# Patient Record
Sex: MALE | Race: WHITE | NOT HISPANIC OR LATINO | URBAN - METROPOLITAN AREA
[De-identification: names, ages, dates, MRNs, and addresses within clinical notes are randomized per-mention and may not be internally consistent; named-entity substitution may affect disease eponyms.]

---

## 2017-01-24 ENCOUNTER — SURGERY - HEALTHEAST (OUTPATIENT)
Dept: CARDIOLOGY | Facility: CLINIC | Age: 57
End: 2017-01-24

## 2017-01-24 ASSESSMENT — MIFFLIN-ST. JEOR
SCORE: 1907.15
SCORE: 1905.79

## 2017-01-25 ENCOUNTER — COMMUNICATION - HEALTHEAST (OUTPATIENT)
Dept: CARDIOLOGY | Facility: CLINIC | Age: 57
End: 2017-01-25

## 2017-01-25 ASSESSMENT — MIFFLIN-ST. JEOR: SCORE: 1912.6

## 2017-01-26 ENCOUNTER — COMMUNICATION - HEALTHEAST (OUTPATIENT)
Dept: CARDIOLOGY | Facility: CLINIC | Age: 57
End: 2017-01-26

## 2017-02-06 ENCOUNTER — AMBULATORY - HEALTHEAST (OUTPATIENT)
Dept: CARDIAC REHAB | Facility: HOSPITAL | Age: 57
End: 2017-02-06

## 2017-02-06 DIAGNOSIS — Z95.5 STENTED CORONARY ARTERY: ICD-10-CM

## 2017-02-06 DIAGNOSIS — I21.4 NSTEMI (NON-ST ELEVATED MYOCARDIAL INFARCTION) (H): ICD-10-CM

## 2017-02-06 ASSESSMENT — MIFFLIN-ST. JEOR: SCORE: 1957.05

## 2017-02-10 ENCOUNTER — AMBULATORY - HEALTHEAST (OUTPATIENT)
Dept: CARDIAC REHAB | Facility: HOSPITAL | Age: 57
End: 2017-02-10

## 2017-02-10 DIAGNOSIS — I21.4 NSTEMI (NON-ST ELEVATED MYOCARDIAL INFARCTION) (H): ICD-10-CM

## 2017-02-10 DIAGNOSIS — Z95.5 STENTED CORONARY ARTERY: ICD-10-CM

## 2017-02-14 ENCOUNTER — COMMUNICATION - HEALTHEAST (OUTPATIENT)
Dept: CARDIOLOGY | Facility: CLINIC | Age: 57
End: 2017-02-14

## 2017-02-14 ENCOUNTER — OFFICE VISIT - HEALTHEAST (OUTPATIENT)
Dept: CARDIOLOGY | Facility: CLINIC | Age: 57
End: 2017-02-14

## 2017-02-14 DIAGNOSIS — I25.10 CORONARY ARTERY DISEASE DUE TO LIPID RICH PLAQUE: ICD-10-CM

## 2017-02-14 DIAGNOSIS — Z72.0 TOBACCO USE: ICD-10-CM

## 2017-02-14 DIAGNOSIS — I25.83 CORONARY ARTERY DISEASE DUE TO LIPID RICH PLAQUE: ICD-10-CM

## 2017-02-14 DIAGNOSIS — E78.5 DYSLIPIDEMIA: ICD-10-CM

## 2017-02-14 DIAGNOSIS — I10 BENIGN ESSENTIAL HYPERTENSION: ICD-10-CM

## 2017-02-14 DIAGNOSIS — G47.33 OBSTRUCTIVE SLEEP APNEA: ICD-10-CM

## 2017-02-14 ASSESSMENT — MIFFLIN-ST. JEOR: SCORE: 1957.05

## 2017-02-20 ENCOUNTER — AMBULATORY - HEALTHEAST (OUTPATIENT)
Dept: CARDIAC REHAB | Facility: HOSPITAL | Age: 57
End: 2017-02-20

## 2017-02-20 ENCOUNTER — COMMUNICATION - HEALTHEAST (OUTPATIENT)
Dept: CARE COORDINATION | Facility: CLINIC | Age: 57
End: 2017-02-20

## 2017-02-20 DIAGNOSIS — Z95.5 STENTED CORONARY ARTERY: ICD-10-CM

## 2017-02-20 DIAGNOSIS — I21.4 NSTEMI (NON-ST ELEVATED MYOCARDIAL INFARCTION) (H): ICD-10-CM

## 2017-02-23 ENCOUNTER — COMMUNICATION - HEALTHEAST (OUTPATIENT)
Dept: CARE COORDINATION | Facility: CLINIC | Age: 57
End: 2017-02-23

## 2017-02-24 ENCOUNTER — AMBULATORY - HEALTHEAST (OUTPATIENT)
Dept: CARDIAC REHAB | Facility: HOSPITAL | Age: 57
End: 2017-02-24

## 2017-02-24 DIAGNOSIS — I21.4 NSTEMI (NON-ST ELEVATED MYOCARDIAL INFARCTION) (H): ICD-10-CM

## 2017-02-24 DIAGNOSIS — Z95.5 STENTED CORONARY ARTERY: ICD-10-CM

## 2017-02-27 ENCOUNTER — AMBULATORY - HEALTHEAST (OUTPATIENT)
Dept: CARDIOLOGY | Facility: CLINIC | Age: 57
End: 2017-02-27

## 2017-02-27 ENCOUNTER — AMBULATORY - HEALTHEAST (OUTPATIENT)
Dept: CARDIAC REHAB | Facility: HOSPITAL | Age: 57
End: 2017-02-27

## 2017-02-27 DIAGNOSIS — I21.4 NSTEMI (NON-ST ELEVATED MYOCARDIAL INFARCTION) (H): ICD-10-CM

## 2017-02-27 DIAGNOSIS — Z95.5 STENTED CORONARY ARTERY: ICD-10-CM

## 2017-03-28 ENCOUNTER — AMBULATORY - HEALTHEAST (OUTPATIENT)
Dept: CARE COORDINATION | Facility: CLINIC | Age: 57
End: 2017-03-28

## 2017-04-26 ENCOUNTER — AMBULATORY - HEALTHEAST (OUTPATIENT)
Dept: CARDIOLOGY | Facility: CLINIC | Age: 57
End: 2017-04-26

## 2017-05-01 ENCOUNTER — OFFICE VISIT - HEALTHEAST (OUTPATIENT)
Dept: CARDIOLOGY | Facility: CLINIC | Age: 57
End: 2017-05-01

## 2017-05-01 ENCOUNTER — AMBULATORY - HEALTHEAST (OUTPATIENT)
Dept: CARDIOLOGY | Facility: CLINIC | Age: 57
End: 2017-05-01

## 2017-05-01 DIAGNOSIS — E78.5 DYSLIPIDEMIA: ICD-10-CM

## 2017-05-01 DIAGNOSIS — Z72.0 TOBACCO USE: ICD-10-CM

## 2017-05-01 DIAGNOSIS — G47.33 OBSTRUCTIVE SLEEP APNEA: ICD-10-CM

## 2017-05-01 DIAGNOSIS — I10 BENIGN ESSENTIAL HYPERTENSION: ICD-10-CM

## 2017-05-01 DIAGNOSIS — E11.29 UNCONTROLLED TYPE 2 DIABETES MELLITUS WITH OTHER DIABETIC KIDNEY COMPLICATION, UNSPECIFIED LONG TERM INSULIN USE STATUS: ICD-10-CM

## 2017-05-01 DIAGNOSIS — I21.4 NSTEMI (NON-ST ELEVATED MYOCARDIAL INFARCTION) (H): ICD-10-CM

## 2017-05-01 DIAGNOSIS — I25.83 CORONARY ARTERY DISEASE DUE TO LIPID RICH PLAQUE: ICD-10-CM

## 2017-05-01 DIAGNOSIS — I25.10 CORONARY ARTERY DISEASE DUE TO LIPID RICH PLAQUE: ICD-10-CM

## 2017-05-01 DIAGNOSIS — E11.65 UNCONTROLLED TYPE 2 DIABETES MELLITUS WITH OTHER DIABETIC KIDNEY COMPLICATION, UNSPECIFIED LONG TERM INSULIN USE STATUS: ICD-10-CM

## 2017-05-01 ASSESSMENT — MIFFLIN-ST. JEOR: SCORE: 1964.98

## 2017-05-10 ENCOUNTER — HOSPITAL ENCOUNTER (OUTPATIENT)
Dept: CARDIOLOGY | Facility: HOSPITAL | Age: 57
Discharge: HOME | End: 2017-05-10
Attending: INTERNAL MEDICINE

## 2017-05-10 ENCOUNTER — HOSPITAL ENCOUNTER (OUTPATIENT)
Dept: NUCLEAR MEDICINE | Facility: HOSPITAL | Age: 57
Discharge: HOME | End: 2017-05-10
Attending: INTERNAL MEDICINE

## 2017-05-10 DIAGNOSIS — I21.4 NSTEMI (NON-ST ELEVATED MYOCARDIAL INFARCTION) (H): ICD-10-CM

## 2017-05-10 DIAGNOSIS — I25.83 CORONARY ARTERY DISEASE DUE TO LIPID RICH PLAQUE: ICD-10-CM

## 2017-05-10 DIAGNOSIS — I25.10 CORONARY ARTERY DISEASE DUE TO LIPID RICH PLAQUE: ICD-10-CM

## 2017-05-10 LAB
CV STRESS MAX HR HE: 147
NUC STRESS EJECTION FRACTION: 61 %
STRESS ECHO BASELINE BP: NORMAL M/S
STRESS ECHO BASELINE HR: 75 BPM
STRESS ECHO CALCULATED PERCENT HR: 90 %
STRESS ECHO LAST STRESS BP: NORMAL M/S
STRESS ECHO POST ESTIMATED WORKLOAD: 7 METS
STRESS ECHO POST EXERCISE DUR MIN: 5 MIN
STRESS ECHO POST EXERCISE DUR SEC: 0 SEC
STRESS ECHO TARGET HR: 139

## 2017-05-27 PROCEDURE — 96376 TX/PRO/DX INJ SAME DRUG ADON: CPT

## 2017-05-27 PROCEDURE — 76775 US EXAM ABDO BACK WALL LIM: CPT | Mod: 26 | Performed by: EMERGENCY MEDICINE

## 2017-05-27 PROCEDURE — 96374 THER/PROPH/DIAG INJ IV PUSH: CPT

## 2017-05-27 PROCEDURE — 99285 EMERGENCY DEPT VISIT HI MDM: CPT | Mod: 25 | Performed by: EMERGENCY MEDICINE

## 2017-05-27 PROCEDURE — 76775 US EXAM ABDO BACK WALL LIM: CPT

## 2017-05-27 PROCEDURE — 81001 URINALYSIS AUTO W/SCOPE: CPT | Performed by: EMERGENCY MEDICINE

## 2017-05-27 PROCEDURE — 99285 EMERGENCY DEPT VISIT HI MDM: CPT | Mod: 25

## 2017-05-28 ENCOUNTER — HOSPITAL ENCOUNTER (EMERGENCY)
Facility: CLINIC | Age: 57
Discharge: HOME | End: 2017-05-28
Attending: EMERGENCY MEDICINE | Admitting: EMERGENCY MEDICINE
Payer: COMMERCIAL

## 2017-05-28 VITALS
BODY MASS INDEX: 34.3 KG/M2 | TEMPERATURE: 98.2 F | SYSTOLIC BLOOD PRESSURE: 162 MMHG | HEART RATE: 97 BPM | HEIGHT: 71 IN | OXYGEN SATURATION: 93 % | DIASTOLIC BLOOD PRESSURE: 90 MMHG | WEIGHT: 245 LBS | RESPIRATION RATE: 18 BRPM

## 2017-05-28 DIAGNOSIS — N20.0 CALCULUS OF KIDNEY: ICD-10-CM

## 2017-05-28 LAB
ALBUMIN SERPL-MCNC: 3.8 G/DL (ref 3.4–5)
ALBUMIN UR-MCNC: 10 MG/DL
ALP SERPL-CCNC: 110 U/L (ref 40–150)
ALT SERPL W P-5'-P-CCNC: 40 U/L (ref 0–70)
ANION GAP SERPL CALCULATED.3IONS-SCNC: 13 MMOL/L (ref 3–14)
APPEARANCE UR: CLEAR
AST SERPL W P-5'-P-CCNC: 19 U/L (ref 0–45)
BASOPHILS # BLD AUTO: 0 10E9/L (ref 0–0.2)
BASOPHILS NFR BLD AUTO: 0.1 %
BILIRUB SERPL-MCNC: 0.6 MG/DL (ref 0.2–1.3)
BILIRUB UR QL STRIP: NEGATIVE
BUN SERPL-MCNC: 20 MG/DL (ref 7–30)
CALCIUM SERPL-MCNC: 8.9 MG/DL (ref 8.5–10.1)
CHLORIDE SERPL-SCNC: 110 MMOL/L (ref 94–109)
CO2 SERPL-SCNC: 22 MMOL/L (ref 20–32)
COLOR UR AUTO: YELLOW
CREAT SERPL-MCNC: 1.29 MG/DL (ref 0.66–1.25)
DIFFERENTIAL METHOD BLD: NORMAL
EOSINOPHIL # BLD AUTO: 0.1 10E9/L (ref 0–0.7)
EOSINOPHIL NFR BLD AUTO: 0.8 %
ERYTHROCYTE [DISTWIDTH] IN BLOOD BY AUTOMATED COUNT: 13.8 % (ref 10–15)
GFR SERPL CREATININE-BSD FRML MDRD: 58 ML/MIN/1.7M2
GLUCOSE SERPL-MCNC: 202 MG/DL (ref 70–99)
GLUCOSE UR STRIP-MCNC: 30 MG/DL
HCT VFR BLD AUTO: 45.9 % (ref 40–53)
HGB BLD-MCNC: 15.8 G/DL (ref 13.3–17.7)
HGB UR QL STRIP: ABNORMAL
IMM GRANULOCYTES # BLD: 0 10E9/L (ref 0–0.4)
IMM GRANULOCYTES NFR BLD: 0.3 %
KETONES UR STRIP-MCNC: 10 MG/DL
LEUKOCYTE ESTERASE UR QL STRIP: NEGATIVE
LYMPHOCYTES # BLD AUTO: 2.3 10E9/L (ref 0.8–5.3)
LYMPHOCYTES NFR BLD AUTO: 23.9 %
MCH RBC QN AUTO: 32.6 PG (ref 26.5–33)
MCHC RBC AUTO-ENTMCNC: 34.4 G/DL (ref 31.5–36.5)
MCV RBC AUTO: 95 FL (ref 78–100)
MONOCYTES # BLD AUTO: 0.8 10E9/L (ref 0–1.3)
MONOCYTES NFR BLD AUTO: 8.6 %
MUCOUS THREADS #/AREA URNS LPF: PRESENT /LPF
NEUTROPHILS # BLD AUTO: 6.5 10E9/L (ref 1.6–8.3)
NEUTROPHILS NFR BLD AUTO: 66.3 %
NITRATE UR QL: NEGATIVE
PH UR STRIP: 5.5 PH (ref 5–7)
PLATELET # BLD AUTO: 157 10E9/L (ref 150–450)
POTASSIUM SERPL-SCNC: 3.8 MMOL/L (ref 3.4–5.3)
PROT SERPL-MCNC: 7.3 G/DL (ref 6.8–8.8)
RBC # BLD AUTO: 4.85 10E12/L (ref 4.4–5.9)
RBC #/AREA URNS AUTO: 6 /HPF (ref 0–2)
SODIUM SERPL-SCNC: 145 MMOL/L (ref 133–144)
SP GR UR STRIP: 1.02 (ref 1–1.03)
URN SPEC COLLECT METH UR: ABNORMAL
UROBILINOGEN UR STRIP-MCNC: NORMAL MG/DL (ref 0–2)
WBC # BLD AUTO: 9.8 10E9/L (ref 4–11)
WBC #/AREA URNS AUTO: 3 /HPF (ref 0–2)

## 2017-05-28 PROCEDURE — 25000128 H RX IP 250 OP 636: Performed by: EMERGENCY MEDICINE

## 2017-05-28 PROCEDURE — 85025 COMPLETE CBC W/AUTO DIFF WBC: CPT | Performed by: EMERGENCY MEDICINE

## 2017-05-28 PROCEDURE — 96374 THER/PROPH/DIAG INJ IV PUSH: CPT

## 2017-05-28 PROCEDURE — 80053 COMPREHEN METABOLIC PANEL: CPT | Performed by: EMERGENCY MEDICINE

## 2017-05-28 PROCEDURE — 25000128 H RX IP 250 OP 636

## 2017-05-28 PROCEDURE — 96376 TX/PRO/DX INJ SAME DRUG ADON: CPT

## 2017-05-28 RX ORDER — LIRAGLUTIDE 6 MG/ML
1.2 INJECTION SUBCUTANEOUS DAILY
COMMUNITY
End: 2021-02-22

## 2017-05-28 RX ORDER — AMLODIPINE BESYLATE 5 MG/1
5 TABLET ORAL DAILY
COMMUNITY

## 2017-05-28 RX ORDER — OXYCODONE HYDROCHLORIDE 5 MG/1
5 TABLET ORAL EVERY 4 HOURS PRN
Qty: 10 TABLET | Refills: 0 | Status: SHIPPED | OUTPATIENT
Start: 2017-05-28 | End: 2017-09-07

## 2017-05-28 RX ORDER — CYANOCOBALAMIN 1000 UG/ML
1000 INJECTION, SOLUTION INTRAMUSCULAR; SUBCUTANEOUS DAILY
COMMUNITY
End: 2021-11-04

## 2017-05-28 RX ORDER — SODIUM CHLORIDE 9 MG/ML
1000 INJECTION, SOLUTION INTRAVENOUS CONTINUOUS
Status: DISCONTINUED | OUTPATIENT
Start: 2017-05-28 | End: 2017-05-28 | Stop reason: HOSPADM

## 2017-05-28 RX ORDER — HYDROMORPHONE HYDROCHLORIDE 1 MG/ML
INJECTION, SOLUTION INTRAMUSCULAR; INTRAVENOUS; SUBCUTANEOUS
Status: COMPLETED
Start: 2017-05-28 | End: 2017-05-28

## 2017-05-28 RX ORDER — ATORVASTATIN CALCIUM 80 MG/1
80 TABLET, FILM COATED ORAL DAILY
COMMUNITY

## 2017-05-28 RX ORDER — METOPROLOL SUCCINATE 50 MG/1
50 TABLET, EXTENDED RELEASE ORAL 2 TIMES DAILY
COMMUNITY

## 2017-05-28 RX ORDER — CLOPIDOGREL BISULFATE 75 MG/1
75 TABLET ORAL DAILY
COMMUNITY

## 2017-05-28 RX ORDER — OXYCODONE HYDROCHLORIDE 5 MG/1
5 TABLET ORAL EVERY 4 HOURS PRN
Status: DISCONTINUED | OUTPATIENT
Start: 2017-05-28 | End: 2017-05-28 | Stop reason: HOSPADM

## 2017-05-28 RX ORDER — LISINOPRIL 40 MG/1
40 TABLET ORAL DAILY
COMMUNITY

## 2017-05-28 RX ORDER — HYDROMORPHONE HYDROCHLORIDE 1 MG/ML
0.5 INJECTION, SOLUTION INTRAMUSCULAR; INTRAVENOUS; SUBCUTANEOUS
Status: COMPLETED | OUTPATIENT
Start: 2017-05-28 | End: 2017-05-28

## 2017-05-28 RX ADMIN — HYDROMORPHONE HYDROCHLORIDE 0.5 MG: 1 INJECTION, SOLUTION INTRAMUSCULAR; INTRAVENOUS; SUBCUTANEOUS at 01:01

## 2017-05-28 RX ADMIN — HYDROMORPHONE HYDROCHLORIDE 0.5 MG: 1 INJECTION, SOLUTION INTRAMUSCULAR; INTRAVENOUS; SUBCUTANEOUS at 00:31

## 2017-05-28 RX ADMIN — HYDROMORPHONE HYDROCHLORIDE 0.5 MG: 1 INJECTION, SOLUTION INTRAMUSCULAR; INTRAVENOUS; SUBCUTANEOUS at 03:48

## 2017-05-28 RX ADMIN — SODIUM CHLORIDE 1000 ML: 9 INJECTION, SOLUTION INTRAVENOUS at 00:17

## 2017-05-28 NOTE — DISCHARGE INSTRUCTIONS
Return to the emergency department if you have fever, severe pain, repeated vomiting, or other concerns.  Otherwise follow up in primary care.

## 2017-05-28 NOTE — ED AVS SNAPSHOT
[facility] Emergency Department    [address] [address]    Phone:  [phone]    Fax:  [phone]                                       Man Acevedo   MRN: [id number]    Department:  [facility] Emergency Department   Date of Visit:  5/27/2017           Patient Information     Date Of Birth          1960        Your diagnoses for this visit were:     Calculus of kidney        You were seen by Jaya Solo MD.        Discharge Instructions       Return to the emergency department if you have fever, severe pain, repeated vomiting, or other concerns.  Otherwise follow up in primary care.    24 Hour Appointment Hotline       To make an appointment at any [facility] clinic, call [phone]). If you don't have a family doctor or clinic, we will help you find one. [facility] clinics are conveniently located to serve the needs of you and your family.             Review of your medicines      START taking        Dose / Directions Last dose taken    oxyCODONE 5 MG IR tablet   Commonly known as:  ROXICODONE   Dose:  5 mg   Quantity:  10 tablet        Take 1 tablet (5 mg) by mouth every 4 hours as needed for pain   Refills:  0          Our records show that you are taking the medicines listed below. If these are incorrect, please call your family doctor or clinic.        Dose / Directions Last dose taken    AMLODIPINE BESYLATE PO   Dose:  5 mg        Take 5 mg by mouth daily   Refills:  0        ASPIRIN PO   Dose:  81 mg        Take 81 mg by mouth daily   Refills:  0        ATORVASTATIN CALCIUM PO   Dose:  80 mg        Take 80 mg by mouth daily   Refills:  0        calcium-vitamin D 500-125 MG-UNIT Tabs        Take by mouth daily   Refills:  0        cyanocobalamin 1000 MCG/ML injection   Commonly known as:  VITAMIN B12   Dose:  1000 mcg        Take 1,000 mcg by mouth daily   Refills:  0        insulin glargine 100 UNIT/ML injection   Commonly known as:   LANTUS   Dose:  60 Units        Inject 60 Units Subcutaneous 2 times daily   Refills:  0        liraglutide 18 MG/3ML soln   Commonly known as:  VICTOZA   Dose:  1.2 mg        Inject 1.2 mg Subcutaneous daily   Refills:  0        LISINOPRIL PO   Dose:  40 mg        Take 40 mg by mouth daily   Refills:  0        METFORMIN HCL PO   Dose:  1000 mg        Take 1,000 mg by mouth 2 times daily (with meals)   Refills:  0        METOPROLOL SUCCINATE ER PO   Dose:  25 mg        Take 25 mg by mouth daily   Refills:  0        NovoLOG FLEXPEN 100 UNIT/ML injection   Dose:  35 Units   Generic drug:  insulin aspart        Inject 35 Units Subcutaneous 2 times daily (with meals)   Refills:  0        PLAVIX PO   Dose:  75 mg        Take 75 mg by mouth daily   Refills:  0        POTASSIUM CITRATE PO        Refills:  0        TAMSULOSIN HCL PO   Dose:  0.4 mg        Take 0.4 mg by mouth daily   Refills:  0                Prescriptions were sent or printed at these locations (1 Prescription)                   Other Prescriptions                Printed at Department/Unit printer (1 of 1)         oxyCODONE (ROXICODONE) 5 MG IR tablet                Procedures and tests performed during your visit     CBC with platelets differential    Comprehensive metabolic panel    POC US RETROPERITONEAL LIMITED    UA reflex to Microscopic      Orders Needing Specimen Collection     None      Pending Results     No orders found from 5/26/2017 to 5/29/2017.            Pending Culture Results     No orders found from 5/26/2017 to 5/29/2017.            Pending Results Instructions     If you had any lab results that were not finalized at the time of your Discharge, you can call the ED Lab Result RN at [phone]. You will be contacted by this team for any positive Lab results or changes in treatment. The nurses are available 7 days a week from 10A to 6:30P.  You can leave a message 24 hours per day and they will return your call.        Test Results  From Your Hospital Stay        5/28/2017 12:58 AM      Component Results     Component Value Ref Range & Units Status    WBC 9.8 4.0 - 11.0 10e9/L Final    RBC Count 4.85 4.4 - 5.9 10e12/L Final    Hemoglobin 15.8 13.3 - 17.7 g/dL Final    Hematocrit 45.9 40.0 - 53.0 % Final    MCV 95 78 - 100 fl Final    MCH 32.6 26.5 - 33.0 pg Final    MCHC 34.4 31.5 - 36.5 g/dL Final    RDW 13.8 10.0 - 15.0 % Final    Platelet Count 157 150 - 450 10e9/L Final    Diff Method Automated Method  Final    % Neutrophils 66.3 % Final    % Lymphocytes 23.9 % Final    % Monocytes 8.6 % Final    % Eosinophils 0.8 % Final    % Basophils 0.1 % Final    % Immature Granulocytes 0.3 % Final    Absolute Neutrophil 6.5 1.6 - 8.3 10e9/L Final    Absolute Lymphocytes 2.3 0.8 - 5.3 10e9/L Final    Absolute Monocytes 0.8 0.0 - 1.3 10e9/L Final    Absolute Eosinophils 0.1 0.0 - 0.7 10e9/L Final    Absolute Basophils 0.0 0.0 - 0.2 10e9/L Final    Abs Immature Granulocytes 0.0 0 - 0.4 10e9/L Final         5/28/2017  1:11 AM      Component Results     Component Value Ref Range & Units Status    Sodium 145 (H) 133 - 144 mmol/L Final    Potassium 3.8 3.4 - 5.3 mmol/L Final    Chloride 110 (H) 94 - 109 mmol/L Final    Carbon Dioxide 22 20 - 32 mmol/L Final    Anion Gap 13 3 - 14 mmol/L Final    Glucose 202 (H) 70 - 99 mg/dL Final    Urea Nitrogen 20 7 - 30 mg/dL Final    Creatinine 1.29 (H) 0.66 - 1.25 mg/dL Final    GFR Estimate 58 (L) >60 mL/min/1.7m2 Final    Non  GFR Calc    GFR Estimate If Black 70 >60 mL/min/1.7m2 Final    African American GFR Calc    Calcium 8.9 8.5 - 10.1 mg/dL Final    Bilirubin Total 0.6 0.2 - 1.3 mg/dL Final    Albumin 3.8 3.4 - 5.0 g/dL Final    Protein Total 7.3 6.8 - 8.8 g/dL Final    Alkaline Phosphatase 110 40 - 150 U/L Final    ALT 40 0 - 70 U/L Final    AST 19 0 - 45 U/L Final         5/28/2017  1:19 AM      Component Results     Component Value Ref Range & Units Status    Color Urine Yellow  Final     "Appearance Urine Clear  Final    Glucose Urine 30 (A) NEG mg/dL Final    Bilirubin Urine Negative NEG Final    Ketones Urine 10 (A) NEG mg/dL Final    Specific Gravity Urine 1.017 1.003 - 1.035 Final    Blood Urine Trace (A) NEG Final    pH Urine 5.5 5.0 - 7.0 pH Final    Protein Albumin Urine 10 (A) NEG mg/dL Final    Urobilinogen mg/dL Normal 0.0 - 2.0 mg/dL Final    Nitrite Urine Negative NEG Final    Leukocyte Esterase Urine Negative NEG Final    Source Midstream Urine  Final    RBC Urine 6 (H) 0 - 2 /HPF Final    WBC Urine 3 (H) 0 - 2 /HPF Final    Mucous Urine Present (A) NEG /LPF Final         5/28/2017 12:37 AM      Impression     [facility] Procedure Note    Limited Bedside ED Renal Ultrasound:    PERFORMED BY: Dr. Jaya Solo  INDICATIONS:  Flank Pain  PROBE: Low frequency convex probe  BODY LOCATION:  Abdomen  FINDINGS:  The ultrasound was performed with longitudinal and transverse views.   Right Kidney:   Hydronephrosis:  None   Renal cyst:  None  Left Kidney:   Hydronephrosis:  Small   Renal cyst:  None  INTERPRETATION:  Left kidney demonstrates hydronephrosis.  IMAGE DOCUMENTATION: Images were archived to PACs system.                  Thank you for choosing [facility]       Thank you for choosing [facility] for your care. Our goal is always to provide you with excellent care. Hearing back from our patients is one way we can continue to improve our services. Please take a few minutes to complete the written survey that you may receive in the mail after you visit with us. Thank you!        [employer] Information     [employer] lets you send messages to your doctor, view your test results, renew your prescriptions, schedule appointments and more. To sign up, go to [email or web address] . Click on \"Log in\" on the left side of the screen, which will take you to the Welcome page. Then click on \"Sign up Now\" on the right side of the page.     You will be asked to enter the access code listed below, " as well as some personal information. Please follow the directions to create your username and password.     Your access code is: [email or web address]  Expires: 2017  4:04 AM     Your access code will  in 90 days. If you need help or a new code, please call your [facility] clinic or [phone].        Care EveryWhere ID     This is your Care EveryWhere ID. This could be used by other organizations to access your [facility] medical records  [id number]        After Visit Summary       This is your record. Keep this with you and show to your community pharmacist(s) and doctor(s) at your next visit.

## 2017-05-28 NOTE — ED AVS SNAPSHOT
[facility] Emergency Department    [address] [address]    Phone:  [phone]    Fax:  [phone]                                       Man Acevedo   MRN: [id number]    Department:  [facility] Emergency Department   Date of Visit:  5/27/2017           After Visit Summary Signature Page     I have received my discharge instructions, and my questions have been answered. I have discussed any challenges I see with this plan with the nurse or doctor.    ..........................................................................................................................................  Patient/Patient Representative Signature      ..........................................................................................................................................  Patient Representative Print Name and Relationship to Patient    ..................................................               ................................................  Date                                            Time    ..........................................................................................................................................  Reviewed by Signature/Title    ...................................................              ..............................................  Date                                                            Time

## 2017-05-28 NOTE — ED PROVIDER NOTES
"  History     Chief Complaint   Patient presents with     Flank Pain     L sided.  long h/o stones     HPI  Man Acevedo is a 57 year old male who presents for left flank pain.  Symptoms started yesterday, feels sharp, rated as moderate to severe, radiates around to the left lower quadrant.  He feels there is blood in his urine.  He says this feels similar to when he has had any stones in the past.  He denies shortness of breath but says that it hurts when he takes a deep breath.  No fever, chills, nausea, vomiting, diarrhea, dysuria, or rash.  No recent trauma.  No hemoptysis.    Past medical history includes ACS, hypertension, diabetes, hyperlipidemia  Daily medications include clopidogrel, metoprolol, atorvastatin, metformin, lisinopril, amlodipine, insulin  Allergies include gabapentin  Does not smoke    I have reviewed the Medications, Allergies, Past Medical and Surgical History, and Social History in the Epic system.    Review of Systems    Physical Exam   BP: (!) 165/95  Pulse: 97  Temp: 98.2  F (36.8  C)  Resp: 18  Height: 180.3 cm (5' 11\")  Weight: 111.1 kg (245 lb)  SpO2: 95 %  Physical Exam   Constitutional: He is oriented to person, place, and time. He appears well-developed and well-nourished. He appears distressed.   HENT:   Head: Normocephalic and atraumatic.   Right Ear: External ear normal.   Left Ear: External ear normal.   Nose: Nose normal.   Eyes: Conjunctivae are normal. No scleral icterus.   Neck: Normal range of motion.   Cardiovascular: Normal rate and regular rhythm.    Pulmonary/Chest: Effort normal. No stridor. No respiratory distress.   Abdominal: Soft. He exhibits no distension. There is tenderness in the left lower quadrant. There is no rigidity, no rebound, no guarding and no CVA tenderness.   Neurological: He is alert and oriented to person, place, and time.   Skin: Skin is warm and dry. He is not diaphoretic.   Psychiatric: He has a normal mood and affect. His behavior is " normal.   Nursing note and vitals reviewed.      ED Course     ED Course     Procedures  Results for orders placed during the hospital encounter of 05/28/17   POC US RETROPERITONEAL LIMITED    Impression [facility] Procedure Note    Limited Bedside ED Renal Ultrasound:    PERFORMED BY: Dr. Jaya Solo  INDICATIONS:  Flank Pain  PROBE: Low frequency convex probe  BODY LOCATION:  Abdomen  FINDINGS:  The ultrasound was performed with longitudinal and transverse views.   Right Kidney:   Hydronephrosis:  None   Renal cyst:  None  Left Kidney:   Hydronephrosis:  Small   Renal cyst:  None  INTERPRETATION:  Left kidney demonstrates hydronephrosis.  IMAGE DOCUMENTATION: Images were archived to PACs system.               Critical Care time:  none               Labs Ordered and Resulted from Time of ED Arrival Up to the Time of Departure from the ED   CBC WITH PLATELETS DIFFERENTIAL   COMPREHENSIVE METABOLIC PANEL   URINE MACROSCOPIC WITH REFLEX TO MICRO       Assessments & Plan (with Medical Decision Making)   57-year-old male who presents for left flank pain.  Differential includes nephrolithiasis, pyelonephritis, diverticulitis, small bowel obstruction.  Blood pressure 154/93, heart rate 97, temperature 98.2 F, SPO2 94% on room air.  He is given IV hydromorphone for pain.  Bedside ultrasound does show some hydronephrosis of the left kidney.  On recheck he is feeling better, tolerating oral intake, white blood cell count 9.8.  Electrolytes within normal limits.  Urinalysis with 6 red blood cells, 3 white blood cells.  He is safe to discharge home with instructions to return if he has worsening symptoms or concerns, otherwise follow-up in clinic.  He is given a short course of oxycodone.  The patient is in agreement with this plan.    I have reviewed the nursing notes.    I have reviewed the findings, diagnosis, plan and need for follow up with the patient.    New Prescriptions    No medications on file        Final diagnoses:   None       5/27/2017   [facility] EMERGENCY DEPARTMENT     Jaya Solo MD  05/28/17 0731

## 2017-06-05 ENCOUNTER — AMBULATORY - HEALTHEAST (OUTPATIENT)
Dept: CARDIOLOGY | Facility: CLINIC | Age: 57
End: 2017-06-05

## 2017-06-05 ASSESSMENT — MIFFLIN-ST. JEOR: SCORE: 1945.93

## 2017-06-06 ENCOUNTER — AMBULATORY - HEALTHEAST (OUTPATIENT)
Dept: CARDIOLOGY | Facility: CLINIC | Age: 57
End: 2017-06-06

## 2017-06-23 ENCOUNTER — AMBULATORY - HEALTHEAST (OUTPATIENT)
Dept: CARDIOLOGY | Facility: CLINIC | Age: 57
End: 2017-06-23

## 2017-06-23 ENCOUNTER — OFFICE VISIT - HEALTHEAST (OUTPATIENT)
Dept: CARDIOLOGY | Facility: CLINIC | Age: 57
End: 2017-06-23

## 2017-06-23 DIAGNOSIS — E78.5 DYSLIPIDEMIA: ICD-10-CM

## 2017-06-23 DIAGNOSIS — Z00.6 RESEARCH EXAM: ICD-10-CM

## 2017-06-23 ASSESSMENT — MIFFLIN-ST. JEOR: SCORE: 1960.44

## 2017-06-27 ENCOUNTER — AMBULATORY - HEALTHEAST (OUTPATIENT)
Dept: CARDIOLOGY | Facility: CLINIC | Age: 57
End: 2017-06-27

## 2017-07-13 ENCOUNTER — AMBULATORY - HEALTHEAST (OUTPATIENT)
Dept: CARDIOLOGY | Facility: CLINIC | Age: 57
End: 2017-07-13

## 2017-07-17 ENCOUNTER — AMBULATORY - HEALTHEAST (OUTPATIENT)
Dept: CARDIOLOGY | Facility: CLINIC | Age: 57
End: 2017-07-17

## 2017-07-17 ENCOUNTER — OFFICE VISIT - HEALTHEAST (OUTPATIENT)
Dept: CARDIOLOGY | Facility: CLINIC | Age: 57
End: 2017-07-17

## 2017-07-17 DIAGNOSIS — Z00.6 RESEARCH EXAM: ICD-10-CM

## 2017-07-17 DIAGNOSIS — I25.83 CORONARY ARTERY DISEASE DUE TO LIPID RICH PLAQUE: ICD-10-CM

## 2017-07-17 DIAGNOSIS — I25.10 CORONARY ARTERY DISEASE DUE TO LIPID RICH PLAQUE: ICD-10-CM

## 2017-07-17 ASSESSMENT — MIFFLIN-ST. JEOR: SCORE: 1964.98

## 2017-07-31 ENCOUNTER — COMMUNICATION - HEALTHEAST (OUTPATIENT)
Dept: CARDIOLOGY | Facility: CLINIC | Age: 57
End: 2017-07-31

## 2017-08-08 ENCOUNTER — COMMUNICATION - HEALTHEAST (OUTPATIENT)
Dept: CARDIOLOGY | Facility: CLINIC | Age: 57
End: 2017-08-08

## 2017-08-21 ENCOUNTER — AMBULATORY - HEALTHEAST (OUTPATIENT)
Dept: CARDIOLOGY | Facility: CLINIC | Age: 57
End: 2017-08-21

## 2017-08-21 ENCOUNTER — RECORDS - HEALTHEAST (OUTPATIENT)
Dept: ADMINISTRATIVE | Facility: OTHER | Age: 57
End: 2017-08-21

## 2017-09-18 ENCOUNTER — RECORDS - HEALTHEAST (OUTPATIENT)
Dept: ADMINISTRATIVE | Facility: OTHER | Age: 57
End: 2017-09-18

## 2017-09-18 ENCOUNTER — AMBULATORY - HEALTHEAST (OUTPATIENT)
Dept: CARDIOLOGY | Facility: CLINIC | Age: 57
End: 2017-09-18

## 2017-09-18 DIAGNOSIS — E78.5 DYSLIPIDEMIA: ICD-10-CM

## 2017-09-18 ASSESSMENT — MIFFLIN-ST. JEOR: SCORE: 1961.59

## 2017-09-22 ENCOUNTER — AMBULATORY - HEALTHEAST (OUTPATIENT)
Dept: CARDIOLOGY | Facility: CLINIC | Age: 57
End: 2017-09-22

## 2017-10-11 ENCOUNTER — OFFICE VISIT - HEALTHEAST (OUTPATIENT)
Dept: CARDIOLOGY | Facility: CLINIC | Age: 57
End: 2017-10-11

## 2017-10-11 DIAGNOSIS — E11.29 UNCONTROLLED TYPE 2 DIABETES MELLITUS WITH OTHER DIABETIC KIDNEY COMPLICATION, UNSPECIFIED LONG TERM INSULIN USE STATUS: ICD-10-CM

## 2017-10-11 DIAGNOSIS — E11.65 UNCONTROLLED TYPE 2 DIABETES MELLITUS WITH OTHER DIABETIC KIDNEY COMPLICATION, UNSPECIFIED LONG TERM INSULIN USE STATUS: ICD-10-CM

## 2017-10-11 DIAGNOSIS — E78.5 DYSLIPIDEMIA: ICD-10-CM

## 2017-10-11 DIAGNOSIS — I10 BENIGN ESSENTIAL HYPERTENSION: ICD-10-CM

## 2017-10-11 DIAGNOSIS — I21.4 NSTEMI (NON-ST ELEVATED MYOCARDIAL INFARCTION) (H): ICD-10-CM

## 2017-10-11 DIAGNOSIS — G47.33 OBSTRUCTIVE SLEEP APNEA: ICD-10-CM

## 2017-10-11 DIAGNOSIS — I25.83 CORONARY ARTERY DISEASE DUE TO LIPID RICH PLAQUE: ICD-10-CM

## 2017-10-11 DIAGNOSIS — I25.10 CORONARY ARTERY DISEASE DUE TO LIPID RICH PLAQUE: ICD-10-CM

## 2017-10-11 ASSESSMENT — MIFFLIN-ST. JEOR: SCORE: 1979.73

## 2017-10-30 ENCOUNTER — RECORDS - HEALTHEAST (OUTPATIENT)
Dept: ADMINISTRATIVE | Facility: OTHER | Age: 57
End: 2017-10-30

## 2017-10-30 ENCOUNTER — AMBULATORY - HEALTHEAST (OUTPATIENT)
Dept: CARDIOLOGY | Facility: CLINIC | Age: 57
End: 2017-10-30

## 2017-11-17 ENCOUNTER — AMBULATORY - HEALTHEAST (OUTPATIENT)
Dept: CARDIOLOGY | Facility: CLINIC | Age: 57
End: 2017-11-17

## 2017-11-17 ENCOUNTER — OFFICE VISIT - HEALTHEAST (OUTPATIENT)
Dept: CARDIOLOGY | Facility: CLINIC | Age: 57
End: 2017-11-17

## 2017-11-17 DIAGNOSIS — E78.5 DYSLIPIDEMIA: ICD-10-CM

## 2017-11-17 DIAGNOSIS — Z00.6 RESEARCH EXAM: ICD-10-CM

## 2017-11-17 ASSESSMENT — MIFFLIN-ST. JEOR: SCORE: 1943.44

## 2017-11-24 ENCOUNTER — AMBULATORY - HEALTHEAST (OUTPATIENT)
Dept: CARDIOLOGY | Facility: CLINIC | Age: 57
End: 2017-11-24

## 2018-01-09 ENCOUNTER — AMBULATORY - HEALTHEAST (OUTPATIENT)
Dept: CARDIOLOGY | Facility: CLINIC | Age: 58
End: 2018-01-09

## 2018-01-09 ENCOUNTER — RECORDS - HEALTHEAST (OUTPATIENT)
Dept: ADMINISTRATIVE | Facility: OTHER | Age: 58
End: 2018-01-09

## 2018-01-12 ENCOUNTER — OFFICE VISIT - HEALTHEAST (OUTPATIENT)
Dept: CARDIOLOGY | Facility: CLINIC | Age: 58
End: 2018-01-12

## 2018-01-12 ENCOUNTER — AMBULATORY - HEALTHEAST (OUTPATIENT)
Dept: CARDIOLOGY | Facility: CLINIC | Age: 58
End: 2018-01-12

## 2018-01-12 DIAGNOSIS — Z00.6 RESEARCH EXAM: ICD-10-CM

## 2018-01-12 DIAGNOSIS — E78.5 DYSLIPIDEMIA: ICD-10-CM

## 2018-01-12 ASSESSMENT — MIFFLIN-ST. JEOR: SCORE: 1979.73

## 2018-01-18 ENCOUNTER — AMBULATORY - HEALTHEAST (OUTPATIENT)
Dept: CARDIOLOGY | Facility: CLINIC | Age: 58
End: 2018-01-18

## 2018-03-08 ENCOUNTER — AMBULATORY - HEALTHEAST (OUTPATIENT)
Dept: CARDIOLOGY | Facility: CLINIC | Age: 58
End: 2018-03-08

## 2018-03-08 ENCOUNTER — OFFICE VISIT - HEALTHEAST (OUTPATIENT)
Dept: CARDIOLOGY | Facility: CLINIC | Age: 58
End: 2018-03-08

## 2018-03-08 DIAGNOSIS — E78.5 DYSLIPIDEMIA: ICD-10-CM

## 2018-03-08 DIAGNOSIS — I10 BENIGN ESSENTIAL HYPERTENSION: ICD-10-CM

## 2018-03-08 DIAGNOSIS — I25.10 CORONARY ARTERY DISEASE DUE TO LIPID RICH PLAQUE: ICD-10-CM

## 2018-03-08 DIAGNOSIS — I21.4 NSTEMI (NON-ST ELEVATED MYOCARDIAL INFARCTION) (H): ICD-10-CM

## 2018-03-08 DIAGNOSIS — G47.33 OBSTRUCTIVE SLEEP APNEA: ICD-10-CM

## 2018-03-08 DIAGNOSIS — E11.65 UNCONTROLLED TYPE 2 DIABETES MELLITUS WITH OTHER DIABETIC KIDNEY COMPLICATION, UNSPECIFIED LONG TERM INSULIN USE STATUS: ICD-10-CM

## 2018-03-08 DIAGNOSIS — I25.83 CORONARY ARTERY DISEASE DUE TO LIPID RICH PLAQUE: ICD-10-CM

## 2018-03-08 DIAGNOSIS — E11.29 UNCONTROLLED TYPE 2 DIABETES MELLITUS WITH OTHER DIABETIC KIDNEY COMPLICATION, UNSPECIFIED LONG TERM INSULIN USE STATUS: ICD-10-CM

## 2018-03-08 ASSESSMENT — MIFFLIN-ST. JEOR: SCORE: 1970.66

## 2018-03-21 ENCOUNTER — RECORDS - HEALTHEAST (OUTPATIENT)
Dept: ADMINISTRATIVE | Facility: OTHER | Age: 58
End: 2018-03-21

## 2018-05-09 ENCOUNTER — RECORDS - HEALTHEAST (OUTPATIENT)
Dept: ADMINISTRATIVE | Facility: OTHER | Age: 58
End: 2018-05-09

## 2018-05-09 ENCOUNTER — COMMUNICATION - HEALTHEAST (OUTPATIENT)
Dept: CARDIOLOGY | Facility: CLINIC | Age: 58
End: 2018-05-09

## 2018-05-14 ENCOUNTER — AMBULATORY - HEALTHEAST (OUTPATIENT)
Dept: CARDIOLOGY | Facility: CLINIC | Age: 58
End: 2018-05-14

## 2018-07-03 ENCOUNTER — AMBULATORY - HEALTHEAST (OUTPATIENT)
Dept: CARDIOLOGY | Facility: CLINIC | Age: 58
End: 2018-07-03

## 2018-07-09 ENCOUNTER — AMBULATORY - HEALTHEAST (OUTPATIENT)
Dept: CARDIOLOGY | Facility: CLINIC | Age: 58
End: 2018-07-09

## 2018-07-09 ENCOUNTER — OFFICE VISIT - HEALTHEAST (OUTPATIENT)
Dept: CARDIOLOGY | Facility: CLINIC | Age: 58
End: 2018-07-09

## 2018-07-09 DIAGNOSIS — E78.5 DYSLIPIDEMIA: ICD-10-CM

## 2018-07-09 DIAGNOSIS — Z00.6 RESEARCH EXAM: ICD-10-CM

## 2018-07-09 ASSESSMENT — MIFFLIN-ST. JEOR: SCORE: 1934.37

## 2018-07-12 ENCOUNTER — AMBULATORY - HEALTHEAST (OUTPATIENT)
Dept: CARDIOLOGY | Facility: CLINIC | Age: 58
End: 2018-07-12

## 2018-09-10 ENCOUNTER — COMMUNICATION - HEALTHEAST (OUTPATIENT)
Dept: CARDIOLOGY | Facility: CLINIC | Age: 58
End: 2018-09-10

## 2018-09-10 ENCOUNTER — AMBULATORY - HEALTHEAST (OUTPATIENT)
Dept: CARDIOLOGY | Facility: CLINIC | Age: 58
End: 2018-09-10

## 2018-11-02 ENCOUNTER — OFFICE VISIT - HEALTHEAST (OUTPATIENT)
Dept: CARDIOLOGY | Facility: CLINIC | Age: 58
End: 2018-11-02

## 2018-11-02 ENCOUNTER — AMBULATORY - HEALTHEAST (OUTPATIENT)
Dept: CARDIOLOGY | Facility: CLINIC | Age: 58
End: 2018-11-02

## 2018-11-02 DIAGNOSIS — E78.5 DYSLIPIDEMIA: ICD-10-CM

## 2018-11-02 DIAGNOSIS — E11.9 DM (DIABETES MELLITUS) (H): ICD-10-CM

## 2018-11-02 DIAGNOSIS — E11.9 DIABETES MELLITUS, TYPE 2 (H): ICD-10-CM

## 2018-11-02 ASSESSMENT — MIFFLIN-ST. JEOR: SCORE: 1975.19

## 2018-11-05 ENCOUNTER — AMBULATORY - HEALTHEAST (OUTPATIENT)
Dept: CARDIOLOGY | Facility: CLINIC | Age: 58
End: 2018-11-05

## 2019-01-25 ENCOUNTER — AMBULATORY - HEALTHEAST (OUTPATIENT)
Dept: CARDIOLOGY | Facility: CLINIC | Age: 59
End: 2019-01-25

## 2019-01-25 ENCOUNTER — COMMUNICATION - HEALTHEAST (OUTPATIENT)
Dept: CARDIOLOGY | Facility: CLINIC | Age: 59
End: 2019-01-25

## 2019-02-08 ENCOUNTER — AMBULATORY - HEALTHEAST (OUTPATIENT)
Dept: CARDIOLOGY | Facility: CLINIC | Age: 59
End: 2019-02-08

## 2019-03-01 ENCOUNTER — COMMUNICATION - HEALTHEAST (OUTPATIENT)
Dept: CARDIOLOGY | Facility: CLINIC | Age: 59
End: 2019-03-01

## 2019-03-01 ENCOUNTER — OFFICE VISIT - HEALTHEAST (OUTPATIENT)
Dept: CARDIOLOGY | Facility: CLINIC | Age: 59
End: 2019-03-01

## 2019-03-01 ENCOUNTER — AMBULATORY - HEALTHEAST (OUTPATIENT)
Dept: CARDIOLOGY | Facility: CLINIC | Age: 59
End: 2019-03-01

## 2019-03-01 DIAGNOSIS — Z79.4 TYPE 2 DIABETES MELLITUS WITH COMPLICATION, WITH LONG-TERM CURRENT USE OF INSULIN (H): ICD-10-CM

## 2019-03-01 DIAGNOSIS — Z72.0 TOBACCO USE: ICD-10-CM

## 2019-03-01 DIAGNOSIS — E11.8 TYPE 2 DIABETES MELLITUS WITH COMPLICATION, WITH LONG-TERM CURRENT USE OF INSULIN (H): ICD-10-CM

## 2019-03-01 DIAGNOSIS — I25.10 CORONARY ARTERY DISEASE DUE TO LIPID RICH PLAQUE: ICD-10-CM

## 2019-03-01 DIAGNOSIS — E78.5 DYSLIPIDEMIA: ICD-10-CM

## 2019-03-01 DIAGNOSIS — E11.9 DIABETES MELLITUS, TYPE 2 (H): ICD-10-CM

## 2019-03-01 DIAGNOSIS — I25.83 CORONARY ARTERY DISEASE DUE TO LIPID RICH PLAQUE: ICD-10-CM

## 2019-03-01 ASSESSMENT — MIFFLIN-ST. JEOR
SCORE: 1938.91
SCORE: 1938.91

## 2019-03-07 ENCOUNTER — AMBULATORY - HEALTHEAST (OUTPATIENT)
Dept: CARDIOLOGY | Facility: CLINIC | Age: 59
End: 2019-03-07

## 2019-03-07 ENCOUNTER — RECORDS - HEALTHEAST (OUTPATIENT)
Dept: ADMINISTRATIVE | Facility: OTHER | Age: 59
End: 2019-03-07

## 2019-05-10 ENCOUNTER — AMBULATORY - HEALTHEAST (OUTPATIENT)
Dept: CARDIOLOGY | Facility: CLINIC | Age: 59
End: 2019-05-10

## 2019-06-04 ENCOUNTER — RECORDS - HEALTHEAST (OUTPATIENT)
Dept: ADMINISTRATIVE | Facility: OTHER | Age: 59
End: 2019-06-04

## 2019-07-08 ENCOUNTER — OFFICE VISIT - HEALTHEAST (OUTPATIENT)
Dept: CARDIOLOGY | Facility: CLINIC | Age: 59
End: 2019-07-08

## 2019-07-08 ENCOUNTER — AMBULATORY - HEALTHEAST (OUTPATIENT)
Dept: CARDIOLOGY | Facility: CLINIC | Age: 59
End: 2019-07-08

## 2019-07-08 DIAGNOSIS — E78.5 DYSLIPIDEMIA: ICD-10-CM

## 2019-07-08 DIAGNOSIS — Z79.4 TYPE 2 DIABETES MELLITUS WITH COMPLICATION, WITH LONG-TERM CURRENT USE OF INSULIN (H): ICD-10-CM

## 2019-07-08 DIAGNOSIS — Z72.0 TOBACCO USE: ICD-10-CM

## 2019-07-08 DIAGNOSIS — E66.09 CLASS 1 OBESITY DUE TO EXCESS CALORIES WITH SERIOUS COMORBIDITY AND BODY MASS INDEX (BMI) OF 33.0 TO 33.9 IN ADULT: ICD-10-CM

## 2019-07-08 DIAGNOSIS — I10 BENIGN ESSENTIAL HYPERTENSION: ICD-10-CM

## 2019-07-08 DIAGNOSIS — E11.8 TYPE 2 DIABETES MELLITUS WITH COMPLICATION, WITH LONG-TERM CURRENT USE OF INSULIN (H): ICD-10-CM

## 2019-07-08 DIAGNOSIS — E66.811 CLASS 1 OBESITY DUE TO EXCESS CALORIES WITH SERIOUS COMORBIDITY AND BODY MASS INDEX (BMI) OF 33.0 TO 33.9 IN ADULT: ICD-10-CM

## 2019-07-08 ASSESSMENT — MIFFLIN-ST. JEOR
SCORE: 1934.37
SCORE: 1934.37

## 2019-07-10 ENCOUNTER — AMBULATORY - HEALTHEAST (OUTPATIENT)
Dept: CARDIOLOGY | Facility: CLINIC | Age: 59
End: 2019-07-10

## 2019-08-27 ENCOUNTER — COMMUNICATION - HEALTHEAST (OUTPATIENT)
Dept: CARDIOLOGY | Facility: CLINIC | Age: 59
End: 2019-08-27

## 2019-11-01 ENCOUNTER — RECORDS - HEALTHEAST (OUTPATIENT)
Dept: ADMINISTRATIVE | Facility: OTHER | Age: 59
End: 2019-11-01

## 2019-11-01 ENCOUNTER — AMBULATORY - HEALTHEAST (OUTPATIENT)
Dept: CARDIOLOGY | Facility: CLINIC | Age: 59
End: 2019-11-01

## 2019-11-04 ENCOUNTER — AMBULATORY - HEALTHEAST (OUTPATIENT)
Dept: CARDIOLOGY | Facility: CLINIC | Age: 59
End: 2019-11-04

## 2019-11-04 ENCOUNTER — OFFICE VISIT - HEALTHEAST (OUTPATIENT)
Dept: CARDIOLOGY | Facility: CLINIC | Age: 59
End: 2019-11-04

## 2019-11-04 DIAGNOSIS — E78.5 DYSLIPIDEMIA: ICD-10-CM

## 2019-11-04 DIAGNOSIS — Z00.6 RESEARCH EXAM: ICD-10-CM

## 2019-11-04 ASSESSMENT — MIFFLIN-ST. JEOR: SCORE: 1929.83

## 2019-11-11 ENCOUNTER — AMBULATORY - HEALTHEAST (OUTPATIENT)
Dept: CARDIOLOGY | Facility: CLINIC | Age: 59
End: 2019-11-11

## 2020-01-15 ENCOUNTER — COMMUNICATION - HEALTHEAST (OUTPATIENT)
Dept: CARDIOLOGY | Facility: CLINIC | Age: 60
End: 2020-01-15

## 2020-03-02 ENCOUNTER — AMBULATORY - HEALTHEAST (OUTPATIENT)
Dept: CARDIOLOGY | Facility: CLINIC | Age: 60
End: 2020-03-02

## 2020-03-02 ENCOUNTER — OFFICE VISIT - HEALTHEAST (OUTPATIENT)
Dept: CARDIOLOGY | Facility: CLINIC | Age: 60
End: 2020-03-02

## 2020-03-02 DIAGNOSIS — Z00.6 RESEARCH EXAM: ICD-10-CM

## 2020-03-02 DIAGNOSIS — E78.5 DYSLIPIDEMIA: ICD-10-CM

## 2020-03-02 ASSESSMENT — MIFFLIN-ST. JEOR
SCORE: 1915.97
SCORE: 1915.97

## 2020-03-04 ENCOUNTER — AMBULATORY - HEALTHEAST (OUTPATIENT)
Dept: CARDIOLOGY | Facility: CLINIC | Age: 60
End: 2020-03-04

## 2020-05-06 ENCOUNTER — COMMUNICATION - HEALTHEAST (OUTPATIENT)
Dept: CARDIOLOGY | Facility: CLINIC | Age: 60
End: 2020-05-06

## 2020-07-02 ENCOUNTER — RECORDS - HEALTHEAST (OUTPATIENT)
Dept: ADMINISTRATIVE | Facility: OTHER | Age: 60
End: 2020-07-02

## 2020-07-02 ENCOUNTER — COMMUNICATION - HEALTHEAST (OUTPATIENT)
Dept: CARDIOLOGY | Facility: CLINIC | Age: 60
End: 2020-07-02

## 2020-07-02 ENCOUNTER — AMBULATORY - HEALTHEAST (OUTPATIENT)
Dept: CARDIOLOGY | Facility: CLINIC | Age: 60
End: 2020-07-02

## 2020-07-09 ENCOUNTER — OFFICE VISIT - HEALTHEAST (OUTPATIENT)
Dept: CARDIOLOGY | Facility: CLINIC | Age: 60
End: 2020-07-09

## 2020-07-09 DIAGNOSIS — E66.09 CLASS 1 OBESITY DUE TO EXCESS CALORIES WITH SERIOUS COMORBIDITY AND BODY MASS INDEX (BMI) OF 33.0 TO 33.9 IN ADULT: ICD-10-CM

## 2020-07-09 DIAGNOSIS — Z72.0 TOBACCO USE: ICD-10-CM

## 2020-07-09 DIAGNOSIS — E11.69 TYPE 2 DIABETES MELLITUS WITH OTHER SPECIFIED COMPLICATION, WITH LONG-TERM CURRENT USE OF INSULIN (H): ICD-10-CM

## 2020-07-09 DIAGNOSIS — I10 BENIGN ESSENTIAL HYPERTENSION: ICD-10-CM

## 2020-07-09 DIAGNOSIS — E78.1 HYPERTRIGLYCERIDEMIA: ICD-10-CM

## 2020-07-09 DIAGNOSIS — E78.5 DYSLIPIDEMIA: ICD-10-CM

## 2020-07-09 DIAGNOSIS — Z79.4 TYPE 2 DIABETES MELLITUS WITH OTHER SPECIFIED COMPLICATION, WITH LONG-TERM CURRENT USE OF INSULIN (H): ICD-10-CM

## 2020-07-09 DIAGNOSIS — E66.811 CLASS 1 OBESITY DUE TO EXCESS CALORIES WITH SERIOUS COMORBIDITY AND BODY MASS INDEX (BMI) OF 33.0 TO 33.9 IN ADULT: ICD-10-CM

## 2020-08-11 ENCOUNTER — AMBULATORY - HEALTHEAST (OUTPATIENT)
Dept: CARDIOLOGY | Facility: CLINIC | Age: 60
End: 2020-08-11

## 2020-08-11 ENCOUNTER — RECORDS - HEALTHEAST (OUTPATIENT)
Dept: ADMINISTRATIVE | Facility: OTHER | Age: 60
End: 2020-08-11

## 2020-08-14 ENCOUNTER — COMMUNICATION - HEALTHEAST (OUTPATIENT)
Dept: CARDIOLOGY | Facility: CLINIC | Age: 60
End: 2020-08-14

## 2020-08-17 ENCOUNTER — OFFICE VISIT - HEALTHEAST (OUTPATIENT)
Dept: CARDIOLOGY | Facility: CLINIC | Age: 60
End: 2020-08-17

## 2020-08-17 DIAGNOSIS — G47.33 OBSTRUCTIVE SLEEP APNEA: ICD-10-CM

## 2020-08-17 DIAGNOSIS — E66.811 CLASS 1 OBESITY DUE TO EXCESS CALORIES WITH SERIOUS COMORBIDITY AND BODY MASS INDEX (BMI) OF 33.0 TO 33.9 IN ADULT: ICD-10-CM

## 2020-08-17 DIAGNOSIS — I25.10 CORONARY ARTERY DISEASE INVOLVING NATIVE CORONARY ARTERY OF NATIVE HEART WITHOUT ANGINA PECTORIS: ICD-10-CM

## 2020-08-17 DIAGNOSIS — I21.4 NSTEMI (NON-ST ELEVATED MYOCARDIAL INFARCTION) (H): ICD-10-CM

## 2020-08-17 DIAGNOSIS — E78.5 DYSLIPIDEMIA: ICD-10-CM

## 2020-08-17 DIAGNOSIS — Z79.4 TYPE 2 DIABETES MELLITUS WITH OTHER SPECIFIED COMPLICATION, WITH LONG-TERM CURRENT USE OF INSULIN (H): ICD-10-CM

## 2020-08-17 DIAGNOSIS — I10 BENIGN ESSENTIAL HYPERTENSION: ICD-10-CM

## 2020-08-17 DIAGNOSIS — E11.69 TYPE 2 DIABETES MELLITUS WITH OTHER SPECIFIED COMPLICATION, WITH LONG-TERM CURRENT USE OF INSULIN (H): ICD-10-CM

## 2020-08-17 DIAGNOSIS — E66.09 CLASS 1 OBESITY DUE TO EXCESS CALORIES WITH SERIOUS COMORBIDITY AND BODY MASS INDEX (BMI) OF 33.0 TO 33.9 IN ADULT: ICD-10-CM

## 2020-08-17 ASSESSMENT — MIFFLIN-ST. JEOR: SCORE: 1916.23

## 2020-08-25 ENCOUNTER — RECORDS - HEALTHEAST (OUTPATIENT)
Dept: ADMINISTRATIVE | Facility: OTHER | Age: 60
End: 2020-08-25

## 2020-08-27 ENCOUNTER — HOSPITAL ENCOUNTER (OUTPATIENT)
Dept: NUCLEAR MEDICINE | Facility: HOSPITAL | Age: 60
Discharge: HOME | End: 2020-08-27
Attending: INTERNAL MEDICINE

## 2020-08-27 ENCOUNTER — HOSPITAL ENCOUNTER (OUTPATIENT)
Dept: CARDIOLOGY | Facility: HOSPITAL | Age: 60
Discharge: HOME | End: 2020-08-27
Attending: INTERNAL MEDICINE

## 2020-08-27 DIAGNOSIS — I25.10 CORONARY ARTERY DISEASE INVOLVING NATIVE CORONARY ARTERY OF NATIVE HEART WITHOUT ANGINA PECTORIS: ICD-10-CM

## 2020-08-27 DIAGNOSIS — I21.4 NSTEMI (NON-ST ELEVATED MYOCARDIAL INFARCTION) (H): ICD-10-CM

## 2020-08-27 LAB
CV STRESS CURRENT BP HE: NORMAL
CV STRESS CURRENT HR HE: 101
CV STRESS CURRENT HR HE: 106
CV STRESS CURRENT HR HE: 111
CV STRESS CURRENT HR HE: 114
CV STRESS CURRENT HR HE: 118
CV STRESS CURRENT HR HE: 121
CV STRESS CURRENT HR HE: 131
CV STRESS CURRENT HR HE: 132
CV STRESS CURRENT HR HE: 132
CV STRESS CURRENT HR HE: 139
CV STRESS CURRENT HR HE: 143
CV STRESS CURRENT HR HE: 143
CV STRESS CURRENT HR HE: 82
CV STRESS CURRENT HR HE: 86
CV STRESS CURRENT HR HE: 91
CV STRESS CURRENT HR HE: 91
CV STRESS CURRENT HR HE: 92
CV STRESS CURRENT HR HE: 94
CV STRESS CURRENT HR HE: 94
CV STRESS CURRENT HR HE: 95
CV STRESS CURRENT HR HE: 95
CV STRESS CURRENT HR HE: 98
CV STRESS DEVIATION TIME HE: NORMAL
CV STRESS ECHO PERCENT HR HE: NORMAL
CV STRESS EXERCISE STAGE HE: NORMAL
CV STRESS EXERCISE STAGE REACHED HE: NORMAL
CV STRESS FINAL RESTING BP HE: NORMAL
CV STRESS FINAL RESTING HR HE: 91
CV STRESS MAX HR HE: 149
CV STRESS MAX TREADMILL GRADE HE: 12
CV STRESS MAX TREADMILL SPEED HE: 2.5
CV STRESS PEAK DIA BP HE: NORMAL
CV STRESS PEAK SYS BP HE: NORMAL
CV STRESS PHASE HE: NORMAL
CV STRESS PROTOCOL HE: NORMAL
CV STRESS RESTING PT POSITION HE: NORMAL
CV STRESS RESTING PT POSITION HE: NORMAL
CV STRESS ST DEVIATION AMOUNT HE: NORMAL
CV STRESS ST DEVIATION ELEVATION HE: NORMAL
CV STRESS ST EVELATION AMOUNT HE: NORMAL
CV STRESS TEST TYPE HE: NORMAL
CV STRESS TOTAL STAGE TIME MIN 1 HE: NORMAL
NUC REST EJECTION FRACTION: 58 %
RATE PRESSURE PRODUCT: NORMAL
STRESS ECHO BASELINE DIASTOLIC HE: 65
STRESS ECHO BASELINE HR: 86
STRESS ECHO BASELINE SYSTOLIC BP: 151
STRESS ECHO CALCULATED PERCENT HR: 93 %
STRESS ECHO LAST STRESS DIASTOLIC BP: 62
STRESS ECHO LAST STRESS HR: 143
STRESS ECHO LAST STRESS SYSTOLIC BP: 140
STRESS ECHO POST ESTIMATED WORKLOAD: 6.4
STRESS ECHO POST EXERCISE DUR MIN: 4
STRESS ECHO POST EXERCISE DUR SEC: 30
STRESS ECHO TARGET HR: 161

## 2020-08-28 ENCOUNTER — RECORDS - HEALTHEAST (OUTPATIENT)
Dept: ADMINISTRATIVE | Facility: OTHER | Age: 60
End: 2020-08-28

## 2020-09-03 ENCOUNTER — COMMUNICATION - HEALTHEAST (OUTPATIENT)
Dept: CARDIOLOGY | Facility: CLINIC | Age: 60
End: 2020-09-03

## 2020-09-03 ENCOUNTER — AMBULATORY - HEALTHEAST (OUTPATIENT)
Dept: CARDIOLOGY | Facility: CLINIC | Age: 60
End: 2020-09-03

## 2020-09-14 ENCOUNTER — COMMUNICATION - HEALTHEAST (OUTPATIENT)
Dept: CARDIOLOGY | Facility: CLINIC | Age: 60
End: 2020-09-14

## 2020-09-14 DIAGNOSIS — I48.0 PAROXYSMAL ATRIAL FIBRILLATION (H): ICD-10-CM

## 2020-09-16 ENCOUNTER — RECORDS - HEALTHEAST (OUTPATIENT)
Dept: ADMINISTRATIVE | Facility: OTHER | Age: 60
End: 2020-09-16

## 2020-09-17 ENCOUNTER — COMMUNICATION - HEALTHEAST (OUTPATIENT)
Dept: CARDIOLOGY | Facility: CLINIC | Age: 60
End: 2020-09-17

## 2020-09-17 DIAGNOSIS — I48.0 PAROXYSMAL ATRIAL FIBRILLATION (H): ICD-10-CM

## 2020-09-28 ENCOUNTER — RECORDS - HEALTHEAST (OUTPATIENT)
Dept: ADMINISTRATIVE | Facility: OTHER | Age: 60
End: 2020-09-28

## 2020-09-28 ENCOUNTER — COMMUNICATION - HEALTHEAST (OUTPATIENT)
Dept: CARDIOLOGY | Facility: CLINIC | Age: 60
End: 2020-09-28

## 2020-09-28 LAB
CHOLEST SERPL-MCNC: 95 MG/DL (ref 0–200)
HDLC SERPL-MCNC: 25 MG/DL
LDLC SERPL CALC-MCNC: 45 MG/DL
NON HDL CHOL. (LDL+VLDL): 70

## 2020-10-05 ENCOUNTER — COMMUNICATION - HEALTHEAST (OUTPATIENT)
Dept: CARDIOLOGY | Facility: CLINIC | Age: 60
End: 2020-10-05

## 2020-10-08 ENCOUNTER — RECORDS - HEALTHEAST (OUTPATIENT)
Dept: ADMINISTRATIVE | Facility: OTHER | Age: 60
End: 2020-10-08

## 2020-10-09 ENCOUNTER — AMBULATORY - HEALTHEAST (OUTPATIENT)
Dept: CARDIOLOGY | Facility: CLINIC | Age: 60
End: 2020-10-09

## 2020-10-09 DIAGNOSIS — E78.5 DYSLIPIDEMIA: ICD-10-CM

## 2020-10-19 ENCOUNTER — COMMUNICATION - HEALTHEAST (OUTPATIENT)
Dept: CARDIOLOGY | Facility: CLINIC | Age: 60
End: 2020-10-19

## 2020-10-19 ENCOUNTER — AMBULATORY - HEALTHEAST (OUTPATIENT)
Dept: CARDIOLOGY | Facility: CLINIC | Age: 60
End: 2020-10-19

## 2020-10-22 ENCOUNTER — RECORDS - HEALTHEAST (OUTPATIENT)
Dept: ADMINISTRATIVE | Facility: OTHER | Age: 60
End: 2020-10-22

## 2020-10-22 LAB
CREAT SERPL-MCNC: 1.4 MG/DL (ref 0.7–1.2)
GFR ESTIMATE EXT - HISTORICAL: 52 ML/MIN/1.73M2

## 2020-10-28 ENCOUNTER — COMMUNICATION - HEALTHEAST (OUTPATIENT)
Dept: CARDIOLOGY | Facility: CLINIC | Age: 60
End: 2020-10-28

## 2020-10-29 ENCOUNTER — AMBULATORY - HEALTHEAST (OUTPATIENT)
Dept: CARDIOLOGY | Facility: CLINIC | Age: 60
End: 2020-10-29

## 2020-10-29 ENCOUNTER — OFFICE VISIT - HEALTHEAST (OUTPATIENT)
Dept: CARDIOLOGY | Facility: CLINIC | Age: 60
End: 2020-10-29

## 2020-10-29 DIAGNOSIS — Z72.0 TOBACCO USE: ICD-10-CM

## 2020-10-29 DIAGNOSIS — E78.1 HYPERTRIGLYCERIDEMIA: ICD-10-CM

## 2020-10-29 DIAGNOSIS — E78.5 DYSLIPIDEMIA: ICD-10-CM

## 2020-10-29 DIAGNOSIS — Z79.4 TYPE 2 DIABETES MELLITUS WITH OTHER SPECIFIED COMPLICATION, WITH LONG-TERM CURRENT USE OF INSULIN (H): ICD-10-CM

## 2020-10-29 DIAGNOSIS — E11.69 TYPE 2 DIABETES MELLITUS WITH OTHER SPECIFIED COMPLICATION, WITH LONG-TERM CURRENT USE OF INSULIN (H): ICD-10-CM

## 2020-10-29 DIAGNOSIS — I48.0 PAROXYSMAL ATRIAL FIBRILLATION (H): ICD-10-CM

## 2020-11-03 ENCOUNTER — AMBULATORY - HEALTHEAST (OUTPATIENT)
Dept: CARDIOLOGY | Facility: CLINIC | Age: 60
End: 2020-11-03

## 2020-11-03 ENCOUNTER — RECORDS - HEALTHEAST (OUTPATIENT)
Dept: ADMINISTRATIVE | Facility: OTHER | Age: 60
End: 2020-11-03

## 2020-11-05 ENCOUNTER — OFFICE VISIT - HEALTHEAST (OUTPATIENT)
Dept: CARDIOLOGY | Facility: CLINIC | Age: 60
End: 2020-11-05

## 2020-11-05 DIAGNOSIS — G47.33 OBSTRUCTIVE SLEEP APNEA: ICD-10-CM

## 2020-11-05 DIAGNOSIS — I48.0 PAROXYSMAL ATRIAL FIBRILLATION (H): ICD-10-CM

## 2020-11-05 DIAGNOSIS — I25.10 CORONARY ARTERY DISEASE INVOLVING NATIVE CORONARY ARTERY OF NATIVE HEART WITHOUT ANGINA PECTORIS: ICD-10-CM

## 2020-11-05 DIAGNOSIS — I10 BENIGN ESSENTIAL HYPERTENSION: ICD-10-CM

## 2020-11-05 LAB
ATRIAL RATE - MUSE: 77 BPM
DIASTOLIC BLOOD PRESSURE - MUSE: NORMAL
INTERPRETATION ECG - MUSE: NORMAL
P AXIS - MUSE: 40 DEGREES
PR INTERVAL - MUSE: 198 MS
QRS DURATION - MUSE: 82 MS
QT - MUSE: 378 MS
QTC - MUSE: 427 MS
R AXIS - MUSE: -27 DEGREES
SYSTOLIC BLOOD PRESSURE - MUSE: NORMAL
T AXIS - MUSE: 36 DEGREES
TSH SERPL DL<=0.005 MIU/L-ACNC: 1.13 UIU/ML (ref 0.3–5)
VENTRICULAR RATE- MUSE: 77 BPM

## 2020-11-09 ENCOUNTER — RECORDS - HEALTHEAST (OUTPATIENT)
Dept: HEALTH INFORMATION MANAGEMENT | Facility: CLINIC | Age: 60
End: 2020-11-09

## 2020-11-09 ENCOUNTER — AMBULATORY - HEALTHEAST (OUTPATIENT)
Dept: CARDIOLOGY | Facility: CLINIC | Age: 60
End: 2020-11-09

## 2020-11-09 DIAGNOSIS — I48.0 PAROXYSMAL ATRIAL FIBRILLATION (H): ICD-10-CM

## 2020-11-10 LAB
ATRIAL RATE - MUSE: 75 BPM
DIASTOLIC BLOOD PRESSURE - MUSE: NORMAL
INTERPRETATION ECG - MUSE: NORMAL
P AXIS - MUSE: 50 DEGREES
PR INTERVAL - MUSE: 194 MS
QRS DURATION - MUSE: 84 MS
QT - MUSE: 390 MS
QTC - MUSE: 435 MS
R AXIS - MUSE: -31 DEGREES
SYSTOLIC BLOOD PRESSURE - MUSE: NORMAL
T AXIS - MUSE: 42 DEGREES
VENTRICULAR RATE- MUSE: 75 BPM

## 2020-11-18 ENCOUNTER — HOSPITAL ENCOUNTER (OUTPATIENT)
Dept: CARDIOLOGY | Facility: CLINIC | Age: 60
Discharge: HOME | End: 2020-11-18
Attending: NURSE PRACTITIONER

## 2020-11-18 DIAGNOSIS — I10 BENIGN ESSENTIAL HYPERTENSION: ICD-10-CM

## 2020-11-18 DIAGNOSIS — I48.0 PAROXYSMAL ATRIAL FIBRILLATION (H): ICD-10-CM

## 2020-11-18 DIAGNOSIS — G47.33 OBSTRUCTIVE SLEEP APNEA: ICD-10-CM

## 2020-11-18 DIAGNOSIS — I25.10 CORONARY ARTERY DISEASE INVOLVING NATIVE CORONARY ARTERY OF NATIVE HEART WITHOUT ANGINA PECTORIS: ICD-10-CM

## 2020-11-18 LAB
AORTIC ROOT: 3.5 CM
AORTIC VALVE MEAN VELOCITY: 95 CM/S
ASCENDING AORTA: 3.1 CM
AV DIMENSIONLESS INDEX VTI: 0.8
AV MEAN GRADIENT: 4 MMHG
AV PEAK GRADIENT: 6.8 MMHG
AV VALVE AREA: 3.7 CM2
AV VELOCITY RATIO: 0.7
BSA FOR ECHO PROCEDURE: 2.37 M2
CV BLOOD PRESSURE: ABNORMAL MMHG
CV ECHO HEIGHT: 71 IN
CV ECHO WEIGHT: 248 LBS
DOP CALC AO PEAK VEL: 130 CM/S
DOP CALC AO VTI: 24 CM
DOP CALC LVOT AREA: 4.91 CM2
DOP CALC LVOT DIAMETER: 2.5 CM
DOP CALC LVOT PEAK VEL: 90.2 CM/S
DOP CALC LVOT STROKE VOLUME: 89.8 CM3
DOP CALCLVOT PEAK VEL VTI: 18.3 CM
EJECTION FRACTION: 57 % (ref 55–75)
FRACTIONAL SHORTENING: 30 % (ref 28–44)
INTERVENTRICULAR SEPTUM IN END DIASTOLE: 1.2 CM (ref 0.6–1)
IVS/PW RATIO: 1
LA AREA 1: 23 CM2
LA AREA 2: 21.6 CM2
LEFT ATRIUM LENGTH: 5.7 CM
LEFT ATRIUM SIZE: 3.8 CM
LEFT ATRIUM VOLUME INDEX: 31.3 ML/M2
LEFT ATRIUM VOLUME: 74.1 ML
LEFT VENTRICLE CARDIAC INDEX: 2.7 L/MIN/M2
LEFT VENTRICLE CARDIAC OUTPUT: 6.5 L/MIN
LEFT VENTRICLE DIASTOLIC VOLUME INDEX: 23.6 CM3/M2 (ref 34–74)
LEFT VENTRICLE DIASTOLIC VOLUME: 56 CM3 (ref 62–150)
LEFT VENTRICLE HEART RATE: 72 BPM
LEFT VENTRICLE MASS INDEX: 98.6 G/M2
LEFT VENTRICLE SYSTOLIC VOLUME INDEX: 10.1 CM3/M2 (ref 11–31)
LEFT VENTRICLE SYSTOLIC VOLUME: 24 CM3 (ref 21–61)
LEFT VENTRICULAR INTERNAL DIMENSION IN DIASTOLE: 5 CM (ref 4.2–5.8)
LEFT VENTRICULAR INTERNAL DIMENSION IN SYSTOLE: 3.5 CM (ref 2.5–4)
LEFT VENTRICULAR MASS: 233.7 G
LEFT VENTRICULAR OUTFLOW TRACT MEAN GRADIENT: 2 MMHG
LEFT VENTRICULAR OUTFLOW TRACT MEAN VELOCITY: 65.7 CM/S
LEFT VENTRICULAR OUTFLOW TRACT PEAK GRADIENT: 3 MMHG
LEFT VENTRICULAR POSTERIOR WALL IN END DIASTOLE: 1.2 CM (ref 0.6–1)
LV STROKE VOLUME INDEX: 37.9 ML/M2
MITRAL VALVE E/A RATIO: 1
MV AVERAGE E/E' RATIO: 11.6 CM/S
MV DECELERATION TIME: 250 MS
MV E'TISSUE VEL-LAT: 4.91 CM/S
MV E'TISSUE VEL-MED: 4.52 CM/S
MV LATERAL E/E' RATIO: 11.2
MV MEDIAL E/E' RATIO: 12.1
MV PEAK A VELOCITY: 56.8 CM/S
MV PEAK E VELOCITY: 54.8 CM/S
NUC REST DIASTOLIC VOLUME INDEX: 3968 LBS
NUC REST SYSTOLIC VOLUME INDEX: 71 IN
TRICUSPID VALVE ANULAR PLANE SYSTOLIC EXCURSION: 2.6 CM

## 2020-11-18 ASSESSMENT — MIFFLIN-ST. JEOR: SCORE: 1957.05

## 2020-12-02 ENCOUNTER — COMMUNICATION - HEALTHEAST (OUTPATIENT)
Dept: CARDIOLOGY | Facility: CLINIC | Age: 60
End: 2020-12-02

## 2020-12-03 ENCOUNTER — OFFICE VISIT - HEALTHEAST (OUTPATIENT)
Dept: CARDIOLOGY | Facility: CLINIC | Age: 60
End: 2020-12-03

## 2020-12-03 ENCOUNTER — RECORDS - HEALTHEAST (OUTPATIENT)
Dept: ADMINISTRATIVE | Facility: OTHER | Age: 60
End: 2020-12-03

## 2020-12-03 ENCOUNTER — AMBULATORY - HEALTHEAST (OUTPATIENT)
Dept: CARDIOLOGY | Facility: CLINIC | Age: 60
End: 2020-12-03

## 2020-12-03 DIAGNOSIS — I48.0 PAROXYSMAL ATRIAL FIBRILLATION (H): ICD-10-CM

## 2020-12-07 ENCOUNTER — AMBULATORY - HEALTHEAST (OUTPATIENT)
Dept: CARDIOLOGY | Facility: CLINIC | Age: 60
End: 2020-12-07

## 2020-12-07 DIAGNOSIS — I48.0 PAROXYSMAL ATRIAL FIBRILLATION (H): ICD-10-CM

## 2020-12-08 LAB
ATRIAL RATE - MUSE: 78 BPM
DIASTOLIC BLOOD PRESSURE - MUSE: NORMAL
INTERPRETATION ECG - MUSE: NORMAL
P AXIS - MUSE: 24 DEGREES
PR INTERVAL - MUSE: 196 MS
QRS DURATION - MUSE: 86 MS
QT - MUSE: 422 MS
QTC - MUSE: 481 MS
R AXIS - MUSE: -40 DEGREES
SYSTOLIC BLOOD PRESSURE - MUSE: NORMAL
T AXIS - MUSE: -1 DEGREES
VENTRICULAR RATE- MUSE: 78 BPM

## 2021-01-07 ENCOUNTER — COMMUNICATION - HEALTHEAST (OUTPATIENT)
Dept: CARDIOLOGY | Facility: CLINIC | Age: 61
End: 2021-01-07

## 2021-02-22 ENCOUNTER — COMMUNICATION - HEALTHEAST (OUTPATIENT)
Dept: CARDIOLOGY | Facility: CLINIC | Age: 61
End: 2021-02-22

## 2021-02-23 ENCOUNTER — OFFICE VISIT - HEALTHEAST (OUTPATIENT)
Dept: CARDIOLOGY | Facility: CLINIC | Age: 61
End: 2021-02-23

## 2021-02-23 ENCOUNTER — AMBULATORY - HEALTHEAST (OUTPATIENT)
Dept: CARDIOLOGY | Facility: CLINIC | Age: 61
End: 2021-02-23

## 2021-02-23 DIAGNOSIS — I25.10 CORONARY ARTERY DISEASE INVOLVING NATIVE CORONARY ARTERY OF NATIVE HEART WITHOUT ANGINA PECTORIS: ICD-10-CM

## 2021-02-23 DIAGNOSIS — E78.5 DYSLIPIDEMIA: ICD-10-CM

## 2021-02-23 DIAGNOSIS — E66.811 CLASS 1 OBESITY DUE TO EXCESS CALORIES WITH SERIOUS COMORBIDITY AND BODY MASS INDEX (BMI) OF 33.0 TO 33.9 IN ADULT: ICD-10-CM

## 2021-02-23 DIAGNOSIS — I48.0 PAROXYSMAL ATRIAL FIBRILLATION (H): ICD-10-CM

## 2021-02-23 DIAGNOSIS — I10 BENIGN ESSENTIAL HYPERTENSION: ICD-10-CM

## 2021-02-23 DIAGNOSIS — G47.33 OBSTRUCTIVE SLEEP APNEA: ICD-10-CM

## 2021-02-23 DIAGNOSIS — E66.09 CLASS 1 OBESITY DUE TO EXCESS CALORIES WITH SERIOUS COMORBIDITY AND BODY MASS INDEX (BMI) OF 33.0 TO 33.9 IN ADULT: ICD-10-CM

## 2021-02-23 DIAGNOSIS — E11.69 TYPE 2 DIABETES MELLITUS WITH OTHER SPECIFIED COMPLICATION, WITH LONG-TERM CURRENT USE OF INSULIN (H): ICD-10-CM

## 2021-02-23 DIAGNOSIS — Z79.4 TYPE 2 DIABETES MELLITUS WITH OTHER SPECIFIED COMPLICATION, WITH LONG-TERM CURRENT USE OF INSULIN (H): ICD-10-CM

## 2021-02-23 ASSESSMENT — MIFFLIN-ST. JEOR: SCORE: 1929.83

## 2021-05-11 ENCOUNTER — COMMUNICATION - HEALTHEAST (OUTPATIENT)
Dept: CARDIOLOGY | Facility: CLINIC | Age: 61
End: 2021-05-11

## 2021-05-11 ENCOUNTER — AMBULATORY - HEALTHEAST (OUTPATIENT)
Dept: CARDIOLOGY | Facility: CLINIC | Age: 61
End: 2021-05-11

## 2021-05-25 ENCOUNTER — RECORDS - HEALTHEAST (OUTPATIENT)
Dept: ADMINISTRATIVE | Facility: CLINIC | Age: 61
End: 2021-05-25

## 2021-05-26 ENCOUNTER — RECORDS - HEALTHEAST (OUTPATIENT)
Dept: ADMINISTRATIVE | Facility: CLINIC | Age: 61
End: 2021-05-26

## 2021-05-28 ENCOUNTER — RECORDS - HEALTHEAST (OUTPATIENT)
Dept: ADMINISTRATIVE | Facility: CLINIC | Age: 61
End: 2021-05-28

## 2021-05-28 NOTE — PROGRESS NOTES
Pemafibrate to Reduce cardiovascular OutcoMes by reducing triglycerides IN patiENts with diabeTes (PROMINENT) clinical trial.    Study telephone visit form:    Subject Number:   [id number]                                     Dr. Barber- PI      CONCOMITANT MEDICATIONS  ? Investigator to report if participant needs treatment with prohibited medications (Fibrates or other agents with PPAR-? agonist activity (e.g., saroglitazar); See protocol for exclusionary medications and actions to be taken.    VISIT SCHEDULING AND CONTACT CONFIRMATION  ? Confirm date of next study visit   ? Confirm information on Subject Information Form or update as needed      Visit Number:__V14_____  Visit Date: 2019      Was the subject, relatives or health care provider (as per consent) contacted by phone?    [x] Yes  [] No    Yes [x]  No []   If Yes, Date of Contact:   Date: ___ ____ ____   [facility] YYYY  If No, please document attempts to contact subject (include date and type of contact)   1 Date: ____ ____ ____   Type: [] Tel _ [] Other___  [] 2 Date:      Name of Person: Contacted:   _____Self/Subject_______________   Relationship to Subject:   ______________________  Type:[]  Tel[x]  Other _______   Date: ___ ____ ____   Type: [] Tel [] Other _______    Subject Status on the day assessed     [x] Subject alive  []  Subject    [] Unknown at present Please complete  Death Details Form in InForm and [facility]        Review Subject Contact Information Form and update as needed   [x]  Review concomitant medication use   [x]  Query participant and record any reported AEs   [x] Query participant and record any CV efficacy events   [x] Confirm next study visit: _____tba________   [x] Instruct participants to bring all study supplies with them to the next in-person visit    Instruct participants to bring all study supplies with them to the next in-person visit  I also emphasized taking the study  medication as he forgot some doses at the previous study visit due to vacation.  He agreed with the reminder.  Instruct participants to fast for ? 8 hours immediately prior to next in-person visit (i.e nothing by mouth, except water and essential medications).     Joe and I discussed his recent kidney tumor surgery. He reports he'll to follow up every 3 months with his doctor but otherwise doesn't need chemo/radiation or any other treatment.  He also reports he developed pinched nerve from his arm position during surgery.  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  Subject reported his arm was positioned incorrectly and he developed a pinched nerve from his shoulder down his left arm.  He reported his whole arm was initially numb but it is now improving and just his lower portion of the arm is tingling.  His physician said this could take several months to fully resolve.  No other complaints.    Adverse Event: radiculopathy left arm  Serious:  [] Yes   [x] No  Reportable to IRB:   [] Yes   [x] No  Severity(mild/moderate/severe): mild   Date of Awareness: [id number]  Start Date: 3/22/2019  End Date:   08Jul2019   Action taken with study treatment:  []Drug Interrupted  []Drug Withdrawal              [x]No Action  Outcome:   []Not Recovered/Not Resolved  []Recovered/Resolved  []Recovered/Resolved with Sequelae   [x]Recovering/Resolving    Medication or therapies taken:  [] Yes   [x] No    Dr. Barber: Please assign causality of above AE  Relationship: Is there a reasonable possibility that the event may have been caused by: Answer No or Yes  1. Investigational Medicinal Product?  [] Yes   [x] No

## 2021-05-29 ENCOUNTER — RECORDS - HEALTHEAST (OUTPATIENT)
Dept: ADMINISTRATIVE | Facility: CLINIC | Age: 61
End: 2021-05-29

## 2021-05-30 ENCOUNTER — RECORDS - HEALTHEAST (OUTPATIENT)
Dept: ADMINISTRATIVE | Facility: CLINIC | Age: 61
End: 2021-05-30

## 2021-05-30 VITALS — BODY MASS INDEX: 34.34 KG/M2 | WEIGHT: 246.2 LBS

## 2021-05-30 VITALS — BODY MASS INDEX: 33.89 KG/M2 | WEIGHT: 243 LBS

## 2021-05-30 VITALS — HEIGHT: 72 IN | WEIGHT: 248 LBS | BODY MASS INDEX: 33.59 KG/M2

## 2021-05-30 VITALS — WEIGHT: 238.2 LBS | HEIGHT: 71 IN | BODY MASS INDEX: 33.35 KG/M2

## 2021-05-30 VITALS — HEIGHT: 71 IN | BODY MASS INDEX: 34.72 KG/M2 | WEIGHT: 248 LBS

## 2021-05-30 VITALS — BODY MASS INDEX: 34.17 KG/M2 | WEIGHT: 245 LBS

## 2021-05-30 VITALS — WEIGHT: 239 LBS | BODY MASS INDEX: 33.33 KG/M2

## 2021-05-30 VITALS — WEIGHT: 248 LBS | BODY MASS INDEX: 34.72 KG/M2 | HEIGHT: 71 IN

## 2021-05-30 NOTE — PROGRESS NOTES
Assessment/Plan:     No changes to his medications today. He will continue to follow-up with research per protocol for  Pemafibrate to Reduce cardiovascular OutcoMes by reducing triglycerides IN patiENts with diabeTes (PROMINENT) clinical trial study.  His blood pressure today is 138/78 and heart rate 66.  Systolic blood pressures is mildly elevated.  He reports compliant with taking his blood pressure medications.  He has managed to lose about 16 pounds since his surgery for left renal cancer in March 2019.  He reports having some issue with abdominal bloating and constipation for which he will follow-up with his PCP.  Couple weeks ago he started smoking again.  He has reached out to his PCP, Dr. Nunez and requested for nicotine patch.  We reviewed and discussed about the adverse effect of smoking on his cardiovascular health and recurrent renal cancer.  Patient was highly encouraged to quit smoking.  Also encouraged to walk daily at least for 15 minutes.  Subjective:   Man Acevedo  is a 58 years old with a significant PMH of coronary artery disease with non-STEMI and stent placement 2 years ago, hypertension, diabetes type 2, obesity, DEWAYNE with CPAP use, peripheral neuropathy, nonalcoholic fatty liver disease, dyslipidemia, and recent diagnosis of renal carcinoma who is seen at [facility] today for Pemafibrate to Reduce cardiovascular OutcoMes by reducing triglycerides IN patiENts with diabeTes (PROMINENT) clinical trial study follow-up.     He reports no changes to his health since March 2019 except he underwent successful removal of cancer from his left kidney.  Postprocedure he developed numbness in his left arm from nerve pinch.  He was told that he could take about a year for him to recover.  Left he also underwent upper GI work-up and procedure including Botox for his swallowing issue.  He has been experiencing some abdominal bloating and constipation. He has been working on losing weight..   He denies fatigue, lightheadedness, shortness of breath, dyspnea on exertion, orthopnea, PND, palpitations, chest pain and lower extremity edema.  He works full-time and has not been able to exercise regularly.  He also reports increased stress and therefore started smoking again.     Patient Active Problem List   Diagnosis     Peripheral Neuropathy     Nonalcoholic Fatty Liver Disease     Lower Back Pain     Adjustment Disorder With Anxiety And Depressed Mood     Neck Pain     Dyslipidemia     Pain in joint, ankle and foot     Obstructive sleep apnea     Right carpal tunnel syndrome     Left hand weakness     Tennis elbow     Benign essential hypertension     Obesity due to excess calories     Coronary artery disease due to lipid rich plaque     NSTEMI (non-ST elevated myocardial infarction) (H)     Tobacco use     DM (diabetes mellitus) (H)     Past Medical History:   Diagnosis Date     Coronary artery disease 24Jan2018     Diabetes mellitus (H) 01/2005    adult onset     GERD (gastroesophageal reflux disease)      High cholesterol      Hyperlipidemia 2008     Hypertension 2006     Kidney stones 2012     Liver disease      Myocardial infarction (H)      Neuropathy of foot 07/16/2014     Obesity      DEWAYNE on CPAP      Peripheral Neuropathy     Created by Conversion      Sleep apnea      Uncontrolled type 2 diabetes mellitus with other diabetic kidney complication, unspecified long term insulin use status     Created by Conversion      Past Surgical History:   Procedure Laterality Date     CARDIAC CATHETERIZATION  01/24/2017     CARPAL TUNNEL RELEASE Right 04/12/2016     CORONARY STENT PLACEMENT       KIDNEY STONE SURGERY Left 02/05/2018    stent placement, lithotripsy, nephrolithotomy     KNEE ARTHROSCOPY Left 8/6/15    Partial medial meniscectoy[facility]     LEG SURGERY       Percutaneous Lithotomy  Left 07/11/2014     MA AMPUTATE METACARPAL+FINGER      Description: Metacarpal  "Amputation And Index Finger;  Recorded: 08/15/2014;  Comments: For \"cartilage cancer\"     NJ CATH PLACEMENT & NJX CORONARY ART ANGIO IMG S&I N/A 1/24/2017    Procedure: Coronary Angiogram;  Surgeon: Melissa Freeman MD;  Location: [facility];  Service: Cardiology     NJ L HRT CATH W/NJX L VENTRICULOGRAPHY IMG S&I N/A 1/24/2017    Procedure: Left Heart Catheterization with Left Ventriculogram;  Surgeon: Melissa Freeman MD;  Location: [facility];  Service: Cardiology     NJ LAP,CHOLECYSTECTOMY      Description: Cholecystectomy Laparoscopic;  Proc Date: 12/11/2013;     NJ PERCUT REMV KID STONE,UP TO 2 CM      Description: Percutaneous Lithotomy;  Proc Date: 11/05/2012;     No family history on file.    Social History     Socioeconomic History     Marital status:      Spouse name: Not on file     Number of children: Not on file     Years of education: 13     Highest education level: Not on file   Occupational History     Not on file   Social Needs     Financial resource strain: Not on file     Food insecurity:     Worry: Not on file     Inability: Not on file     Transportation needs:     Medical: Not on file     Non-medical: Not on file   Tobacco Use     Smoking status: Former Smoker     Packs/day: 0.50     Smokeless tobacco: Never Used     Tobacco comment: Patient reports he is trying to quit.   Substance and Sexual Activity     Alcohol use: Yes     Frequency: 2-4 times a month     Drinks per session: 1 or 2     Binge frequency: Never     Drug use: No     Sexual activity: Not on file   Lifestyle     Physical activity:     Days per week: Not on file     Minutes per session: Not on file     Stress: Not on file   Relationships     Social connections:     Talks on phone: Not on file     Gets together: Not on file     Attends Yazidi service: Not on file     Active member of club or organization: Not on file     Attends meetings of clubs or organizations: Not on file     Relationship status: Not " on file     Intimate partner violence:     Fear of current or ex partner: Not on file     Emotionally abused: Not on file     Physically abused: Not on file     Forced sexual activity: Not on file   Other Topics Concern     Not on file   Social History Narrative    Wife of 21 years passed away on 10/15/2017. Had 8 kids with her - 3 of hers from a previous marriage, 3 of his from a previous marriage, and 2 kids they adopted together. He has 20 grandchildren.      Current Outpatient Medications   Medication Sig Dispense Refill     aspirin 81 MG EC tablet Take 81 mg by mouth daily.       atorvastatin (LIPITOR) 80 MG tablet Take 1 tablet (80 mg total) by mouth daily. 30 tablet 1     cholecalciferol, vitamin D3, 1,000 unit tablet Take 1,000 Units by mouth daily.       cyanocobalamin 1000 MCG tablet Take 1,000 mcg by mouth daily.       hydrOXYzine (ATARAX) 25 MG tablet Take 1 tablet (25 mg total) by mouth every 6 (six) hours as needed for anxiety. 15 tablet 0     insulin aspart (NOVOLOG) 100 unit/mL injection Inject 35 Units under the skin 3 (three) times a day before meals.        insulin glargine (LANTUS) 100 unit/mL injection Inject 56 Units under the skin 2 (two) times a day.        liraglutide (VICTOZA) 0.6 mg/0.1 mL (18 mg/3 mL) PnIj injection Inject 1.8 mg under the skin daily.        lisinopril (PRINIVIL,ZESTRIL) 40 MG tablet Take 40 mg by mouth daily.       metFORMIN (GLUCOPHAGE) 1000 MG tablet Take 1 tablet (1,000 mg total) by mouth 2 (two) times a day with meals. 60 tablet 6     metoprolol succinate (TOPROL XL) 25 MG Take 1 tablet (25 mg total) by mouth daily. 30 tablet 0     minocycline (MINOCIN,DYNACIN) 100 MG capsule Take 100 mg by mouth 2 (two) times a day as needed.        potassium citrate (UROCIT-K) 5 mEq (540 mg) SR tablet Take 30 mEq by mouth 2 (two) times a day.        Study Drug pemafibrate 0.2 mg/placebo (PROMINENT) tablet Take 0.2 mg by mouth 2 (two) times a day.       syringe accessory Misc Inject  3 each as directed.       tamsulosin (FLOMAX) 0.4 mg Cp24 Take 1 capsule (0.4 mg total) by mouth daily. 14 capsule 0     Current Facility-Administered Medications   Medication Dose Route Frequency Provider Last Rate Last Dose     Study Drug pemafibrate 0.2 mg/placebo tablet 0.2 mg (PROMINENT)  0.2 mg Oral BID Presley Torres RN         Allergies   Allergen Reactions     Gabapentin      Tolmetin Unknown     Pt is on Plavix     Objective:     Vitals:    07/08/19 0802   BP: 138/78   Pulse: 66   Resp: 16     Wt Readings from Last 3 Encounters:   07/08/19 (!) 243 lb (110.2 kg)   07/08/19 (!) 243 lb (110.2 kg)   03/01/19 (!) 244 lb (110.7 kg)     ROS:  Negative unless noted in HPI    General Appearance:   A & O X3, cooperative and in no acute distress.   HEENT:   No scleral icterus; the mucous membranes were pink and moist. Cervical lymph nodes nontender and nonpalpable.   Neck: JVP normal. No HJR. Thyroid symmetry with no mass or tenderness noted   Chest: The spine was straight. The chest was symmetric.   Lungs:   Respirations unlabored; the lungs are clear to auscultation.   Cardiovascular:   Regular rhythm. S1 and S2 without murmur, clicks or rubs. Radial, carotid and posterior tibial pulses are intact and symmetrical.  No carotid bruits noted   Abdomen:   Large but soft, nontender, nondistended, bowel sounds present   Extremities: No cyanosis, clubbing, or edema.   Skin: No bruising or wounds   Neurologic: Mood and affect are appropriate. No paresthesia noted         Cristofer Narayanan CNP  [facility]   Heart Failure Clinic

## 2021-05-30 NOTE — PATIENT INSTRUCTIONS - HE
It was great to see you again today for the Prominent triglyceride study.  We will call you in 2 months for a study telephone visit.    We will also see you back in 4 months aroud end of October for your Visit 17 which is a NON- fasting visit.  Please remember to bring back your study drug and packages (both empty or full) to every study visit.  Inform us of any changes in your health and call with any questions.  Thanks!    Research Hotline: [phone]  Presley Torres RN  Clinical Trials Nurse

## 2021-05-30 NOTE — PROGRESS NOTES
Pemafibrate to Reduce cardiovascular OutcoMes by reducing triglycerides IN patiENts with diabeTes (PROMINENT) clinical trial.    Subject seen in clinic today for study Visit 15.      Subject seen by provider Cristofer Narayanan for study related physical exam.  See provider note and flow sheets for vital signs.    Waist measures 121 cm    Labs drawn per protocol.  Urine sample collected.  Results will be scanned into 'media'.     EQ-5D-5L questionnaire completed if applicable.  Must complete annually (Month 12, etc.)    Study efficacy, endpoints and adverse events reviewed with subject.  Cardiovascular risk discussed.     Study alcohol consumption stipulations and education reviewed with subject:    Subject reports  [] Never [x] Current [] Former   1 drinks per [] Day [] Week [] Month [x] Occasional.  1 maximum drinks per sitting.   Study medication box # [id number] with 66 tabs  Study medication box # [id number] with 0 tabs  Study medication box # [id number] with 0 tabs  Study medication box # [id number] with 0 tabs  returned by subject.  Total tablet count of 66 for 85 % compliance. 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  Study medication box #'s [id number], [id number], [id number], [id number] dispensed to subject.  Education provided on medication compliance and administration.    Subject reports doing well after kidney removal this spring and botox for his swallowing issues. Updated AE.    Plan: Study Visit 16 telephone call with subject in 2 months.  Will schedule study  Visit 17  with subject in 4 months back at [facility].    Presley Torres RN  Clinical Trials Nurse

## 2021-05-31 ENCOUNTER — RECORDS - HEALTHEAST (OUTPATIENT)
Dept: ADMINISTRATIVE | Facility: CLINIC | Age: 61
End: 2021-05-31

## 2021-05-31 VITALS — WEIGHT: 248 LBS | HEIGHT: 72 IN | BODY MASS INDEX: 33.59 KG/M2

## 2021-05-31 VITALS — BODY MASS INDEX: 34.86 KG/M2 | HEIGHT: 71 IN | WEIGHT: 249 LBS

## 2021-05-31 VITALS — BODY MASS INDEX: 34.3 KG/M2 | HEIGHT: 71 IN | WEIGHT: 245 LBS

## 2021-05-31 VITALS — WEIGHT: 243.8 LBS | BODY MASS INDEX: 33.02 KG/M2 | HEIGHT: 72 IN

## 2021-05-31 VITALS — HEIGHT: 71 IN | WEIGHT: 253 LBS | BODY MASS INDEX: 35.42 KG/M2

## 2021-05-31 VITALS — HEIGHT: 71 IN | BODY MASS INDEX: 35.42 KG/M2 | WEIGHT: 253 LBS

## 2021-05-31 VITALS — WEIGHT: 247 LBS | HEIGHT: 72 IN | BODY MASS INDEX: 33.46 KG/M2

## 2021-05-31 NOTE — TELEPHONE ENCOUNTER
Pemafibrate to Reduce cardiovascular OutcoMes by reducing triglycerides IN patiENts with diabeTes (PROMINENT) clinical trial.    Study telephone visit form:    Subject Number:   [id number]                                     Dr. Barber- PI      CONCOMITANT MEDICATIONS  ? Investigator to report if participant needs treatment with prohibited medications (Fibrates or other agents with PPAR-? agonist activity (e.g., saroglitazar); See protocol for exclusionary medications and actions to be taken.    VISIT SCHEDULING AND CONTACT CONFIRMATION  ? Confirm date of next study visit   ? Confirm information on Subject Information Form or update as needed      Visit Number:__V16_____  Visit Date: 2019      Was the subject, relatives or health care provider (as per consent) contacted by phone?    [x] Yes  [] No    Yes [x]  No []   If Yes, Date of Contact:   Date: ___ ____ ____   [facility] YYYY  If No, please document attempts to contact subject (include date and type of contact)   Date: ____ ____ ____   Type: [] Tel _ [] Other___  [] 2 Date:      Name of Person: Contacted:   _____Self/Subject_______________   Relationship to Subject:   ______________________  Type:[]  Tel[x]  Other _______   Date: ___ ____ ____   Type: [] Tel [] Other _______    Subject Status on the day assessed     [x] Subject alive  []  Subject    [] Unknown at present Please complete  Death Details Form in InForm and submit source documents to [facility]        Review Subject Contact Information Form and update as needed   [x]  Review concomitant medication use   [x]  Query participant and record any reported AEs   [x] Query participant and record any CV efficacy events   [x] Confirm next study visit: ___/nov__________   [x] Instruct participants to bring all study supplies with them to the next in-person visit    Instruct participants to bring all study supplies with them to the next in-person visit   Instruct participants to fast for ?  8 hours immediately prior to next in-person visit (i.e nothing by mouth, except water and essential medications).  Confirmed lipid meds compliance and discuss Apo B repeat testing for next visit.  Joe reports he is otherwise doing quite well and looks forward to our next study visit this fall.    Presley Torres RN

## 2021-06-01 VITALS — HEIGHT: 71 IN | WEIGHT: 251 LBS | BODY MASS INDEX: 35.14 KG/M2

## 2021-06-01 VITALS — WEIGHT: 243 LBS | HEIGHT: 71 IN | BODY MASS INDEX: 34.02 KG/M2

## 2021-06-02 VITALS
WEIGHT: 244 LBS | HEIGHT: 71 IN | WEIGHT: 244 LBS | BODY MASS INDEX: 34.16 KG/M2 | BODY MASS INDEX: 34.16 KG/M2 | HEIGHT: 71 IN

## 2021-06-02 VITALS — HEIGHT: 71 IN | BODY MASS INDEX: 35.28 KG/M2 | WEIGHT: 252 LBS

## 2021-06-03 VITALS — BODY MASS INDEX: 34.02 KG/M2 | HEIGHT: 71 IN | WEIGHT: 243 LBS

## 2021-06-03 VITALS
BODY MASS INDEX: 33.88 KG/M2 | RESPIRATION RATE: 16 BRPM | DIASTOLIC BLOOD PRESSURE: 64 MMHG | HEART RATE: 72 BPM | HEIGHT: 71 IN | SYSTOLIC BLOOD PRESSURE: 112 MMHG | WEIGHT: 242 LBS

## 2021-06-03 VITALS — HEIGHT: 71 IN | BODY MASS INDEX: 34.02 KG/M2 | WEIGHT: 243 LBS

## 2021-06-03 NOTE — PATIENT INSTRUCTIONS - HE
Man Acevedo Sr.,    It was a pleasure to see you today at the [facility].     My recommendations after this visit include:    Follow up per research protocol    Julia Marie CNP  [facility], Electrophysiology  [phone]  EP nurses [phone]

## 2021-06-03 NOTE — PATIENT INSTRUCTIONS - HE
It was great to see you again today for the Prominent triglyceride study.  We will call you in 2 months for a study telephone visit.    We will also see you back in 4 months for your Visit 19 month 32 which is a NON- fasting visit.  Please remember to bring back your study drug and packages (both empty or full) to every study visit.  Inform us of any changes in your health and call with any questions.  Thanks!    Research Hotline: [phone]  Presley Torres RN  Clinical Trials Nurse

## 2021-06-03 NOTE — PROGRESS NOTES
Pemafibrate to Reduce cardiovascular OutcoMes by reducing triglycerides IN patiENts with diabeTes (PROMINENT) clinical trial.    Subject seen in clinic today for study Visit 17 Month 28.      Subject seen by provider Julia Marie for study related physical exam.  See provider note and flow sheets for vital signs.    There were no vitals filed for this visit.  Waist measures 117 cm    Labs drawn per protocol. Apo B retest done per protocol - explained purpose and rational.  Results will be scanned into 'media'.     Study efficacy, endpoints and adverse events reviewed with subject.  Cardiovascular risk discussed.  Subject reported he ran out of his lipitor and wasn't taking it in September and October but restarted 1.5 weeks ago when a new supply arrived in the mail.  Discussed the important of compliance with all of his medications and the purpose of his lipitor.  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     Study alcohol consumption stipulations and education reviewed with subject:    Subject reports  [] Never [x] Current [] Former   1 drinks per [] Day [] Week [] Month [x] Occasional.  2 maximum drinks per sitting.     Study medication box # [id number] with 44 tabs  Study medication box # [id number] with 0 tabs  Study medication box # [id number] with 0 tabs  Study medication box # [id number] with 0 tabs  returned by subject.  Total tablet count of 44 for 99 % compliance.  Study medication box #'s [id number], [id number], [id number], [id number] dispensed to subject.  Education provided on medication compliance and administration    Plan: Study Visit 18 telephone call with subject in 2 months.  Will schedule study Month19 / Visit32  with subject in 4 months back at [facility].    Presley Torres RN  Clinical Trials Nurse

## 2021-06-04 VITALS
WEIGHT: 239 LBS | BODY MASS INDEX: 33.46 KG/M2 | HEART RATE: 50 BPM | HEIGHT: 71 IN | DIASTOLIC BLOOD PRESSURE: 60 MMHG | RESPIRATION RATE: 10 BRPM | SYSTOLIC BLOOD PRESSURE: 124 MMHG

## 2021-06-04 VITALS
HEIGHT: 71 IN | DIASTOLIC BLOOD PRESSURE: 80 MMHG | HEART RATE: 74 BPM | RESPIRATION RATE: 16 BRPM | HEART RATE: 74 BPM | WEIGHT: 239 LBS | HEIGHT: 71 IN | BODY MASS INDEX: 33.46 KG/M2 | OXYGEN SATURATION: 94 % | WEIGHT: 239 LBS | BODY MASS INDEX: 33.46 KG/M2 | OXYGEN SATURATION: 94 % | SYSTOLIC BLOOD PRESSURE: 130 MMHG | SYSTOLIC BLOOD PRESSURE: 130 MMHG | DIASTOLIC BLOOD PRESSURE: 80 MMHG | RESPIRATION RATE: 16 BRPM

## 2021-06-05 VITALS — BODY MASS INDEX: 34.72 KG/M2 | WEIGHT: 248 LBS | HEIGHT: 71 IN

## 2021-06-05 VITALS
HEART RATE: 78 BPM | SYSTOLIC BLOOD PRESSURE: 133 MMHG | DIASTOLIC BLOOD PRESSURE: 77 MMHG | WEIGHT: 242 LBS | BODY MASS INDEX: 33.75 KG/M2

## 2021-06-05 VITALS
RESPIRATION RATE: 16 BRPM | HEART RATE: 75 BPM | BODY MASS INDEX: 34.63 KG/M2 | SYSTOLIC BLOOD PRESSURE: 144 MMHG | DIASTOLIC BLOOD PRESSURE: 82 MMHG | WEIGHT: 248.3 LBS

## 2021-06-05 VITALS
OXYGEN SATURATION: 95 % | DIASTOLIC BLOOD PRESSURE: 84 MMHG | SYSTOLIC BLOOD PRESSURE: 150 MMHG | WEIGHT: 247 LBS | HEART RATE: 78 BPM | BODY MASS INDEX: 34.45 KG/M2

## 2021-06-05 VITALS
SYSTOLIC BLOOD PRESSURE: 133 MMHG | DIASTOLIC BLOOD PRESSURE: 77 MMHG | WEIGHT: 242 LBS | BODY MASS INDEX: 33.88 KG/M2 | HEART RATE: 78 BPM | HEIGHT: 71 IN

## 2021-06-05 VITALS
HEART RATE: 81 BPM | DIASTOLIC BLOOD PRESSURE: 78 MMHG | WEIGHT: 246 LBS | OXYGEN SATURATION: 96 % | BODY MASS INDEX: 34.31 KG/M2 | SYSTOLIC BLOOD PRESSURE: 136 MMHG | RESPIRATION RATE: 18 BRPM

## 2021-06-05 VITALS
HEART RATE: 48 BPM | OXYGEN SATURATION: 96 % | BODY MASS INDEX: 33.89 KG/M2 | DIASTOLIC BLOOD PRESSURE: 54 MMHG | WEIGHT: 243 LBS | RESPIRATION RATE: 18 BRPM | SYSTOLIC BLOOD PRESSURE: 110 MMHG

## 2021-06-05 VITALS
HEART RATE: 48 BPM | SYSTOLIC BLOOD PRESSURE: 110 MMHG | OXYGEN SATURATION: 96 % | RESPIRATION RATE: 18 BRPM | BODY MASS INDEX: 33.89 KG/M2 | WEIGHT: 243 LBS | DIASTOLIC BLOOD PRESSURE: 54 MMHG

## 2021-06-05 NOTE — TELEPHONE ENCOUNTER
Pemafibrate to Reduce cardiovascular OutcoMes by reducing triglycerides IN patiENts with diabeTes (PROMINENT) clinical trial.    Study telephone visit form:    Subject Number:   [id number]                                     Dr. Barber- PI      CONCOMITANT MEDICATIONS  ? Investigator to report if participant needs treatment with prohibited medications (Fibrates or other agents with PPAR-? agonist activity (e.g., saroglitazar); See protocol for exclusionary medications and actions to be taken.    VISIT SCHEDULING AND CONTACT CONFIRMATION  ? Confirm date of next study visit   ? Confirm information on Subject Information Form or update as needed      Visit Number:__V18_____  Visit Date:       Was the subject, relatives or health care provider (as per consent) contacted by phone?    [x] Yes  [] No    Yes [x]  No []   If Yes, Date of Contact:   Date: ___ ____ ____   [facility] YYYY  If No, please document attempts to contact subject (include date and type of contact)   Date: ____ ____ ____   Type: [] Tel _ [] Other___  [] 2 Date:      Name of Person: Contacted:   _____Self/Subject_______________   Relationship to Subject:   ______________________  Type:[]  Tel[x]  Other _______   Date: ___ ____ ____   Type: [] Tel [] Other _______    Subject Status on the day assessed     [x] Subject alive  []  Subject    [] Unknown at present Please complete  Death Details Form in InForm and submit source documents to [facility]        Review Subject Contact Information Form and update as needed   [x]  Review concomitant medication use   [x]  Query participant and record any reported AEs   [x] Query participant and record any CV efficacy events   [x] Confirm next study visit: _____________   [x] Instruct participants to bring all study supplies with them to the next in-person visit    Instruct participants to bring all study supplies with them to the next in-person visit   Instruct participants to fast or not for ? 8  hours immediately prior to next in-person visit (i.e nothing by mouth, except water and essential medications).     Presley Torres RN

## 2021-06-06 NOTE — PROGRESS NOTES
Pemafibrate to Reduce cardiovascular OutcoMes by reducing triglycerides IN patiENts with diabeTes (PROMINENT) clinical trial.    Subject seen in clinic today for study Visit 19.      Research nurse discussed updated [facility] consent form and new [facility] HIPAA form as below.     The study discussion included the following:     Changes made to IRB of record which is now [facility] IRB    New HIPAA form to include [facility] language     Subject asked questions and agreed that he received answers that satisfied him.     Consent form [Version Date: 4.0 17Oct2019] [approved for use and effective on 1/10/2020] signed.   HIPAA form Revision Date 09.14.18 also signed.     A signed copy was given to the subject & a copy was forwarded to medical records.    Subject seen by provider Julia Marie for study related physical exam.  See provider note and flow sheets for vital signs.    Waist measures 113 cm    Labs drawn per protocol.  Results will be scanned into 'media'.     Study efficacy, endpoints and adverse events reviewed with subject.  Cardiovascular risk discussed.     Study alcohol consumption stipulations and education reviewed with subject:    Subject reports  [] Never [x] Current [] Former .   5 drinks per [] Day [x] Week [] Month [] Occasional.  3 maximum drinks per sitting.     Study medication box # [id number] with 34 tabs  Study medication box # [id number] with 70 tabs  Study medication box # [id number] with 0 tabs  Study medication box # [id number] with 0 tabs  returned by subject.  Total tablet count of 104 for 75 % compliance.  Study medication box #'s [id number], [id number], [id number], [id number] dispensed to subject.  Education provided on medication compliance and administration - subject relayed he may have forgot some medication when he was recently traveling.  We went over the important of compliance at length and he was receptive.    Plan: Study Visit 20 telephone call with subject in 2 months.  Will schedule study Visit 21 with  subject in 4 months back at [facility].    Presley Torres RN  Clinical Trials Nurse    ADVERSE EVENT Description:  Subject reports minor cold with coughing for past week, restarted smoking which hasn't helped, some soreness from cough but otherwise reports he is doing well.    Adverse Event: Upper respiratory infection  Serious:  [] Yes   [x] No  Reportable to IRB:   [] Yes   [] No  Severity (mild/moderate/severe):mild   Date of Awareness: 3/2/2020  Start Date: 2/25/2020  End Date: ongoing  Action taken with study treatment:  [x] Dose Not Changed  [] Dose Increased  Outcome:   []Not Recovered/Not Resolved  []Recovered/Resolved  []Recovered/Resolved with Sequelae - specify   [x]Recovering/Resolving  Medication or therapies taken:  [] Yes   [x] No    Dr. Barber: Please assign causality of above AE  Related to study drug?     [] Yes   [x] No

## 2021-06-06 NOTE — PATIENT INSTRUCTIONS - HE
It was great to see you again today for the Prominent triglyceride study.  We will call you in 2 months for a study telephone visit.    We will also see you back in 4 months for your Visit 21 which is a fasting visit with urine sample.  Please remember to bring back your study drug and packages (both empty or full) to every study visit.  Inform us of any changes in your health and call with any questions.  Thanks!    Research Hotline: [phone]  Presley Torres RN  Clinical Trials Nurse

## 2021-06-06 NOTE — PATIENT INSTRUCTIONS - HE
Man Acevedo Sr.,    It was a pleasure to see you today at the [facility].     My recommendations after this visit include:    Follow up per research protocol    Julia Marie CNP  [facility], Electrophysiology  [phone]  EP nurses [phone]

## 2021-06-08 NOTE — PROGRESS NOTES
Cardiac Rehab  Phase II Assessment    Assessment Date: 2/6/17    Diagnosis: NSTE MI  Date of Onset: 1/24/17  Procedure: PCI/STENT LAD  Date of Onset: 1/24/17  ICD/Pacemaker: No   Post-op Complications:   ECG History: NSR EF%:Unavailable  Past Medical History:  Patient Active Problem List   Diagnosis     Peripheral Neuropathy     Nonalcoholic Fatty Liver Disease     Lower Back Pain     Adjustment Disorder With Anxiety And Depressed Mood     Knee Sprain     Neck Pain     Boil Of The Scrotum     Dyslipidemia     Uncontrolled type 2 diabetes mellitus with other diabetic kidney complication, unspecified long term insulin use status     Pain in joint, ankle and foot     Groin (inguinal) pain bilaterally     Obstructive sleep apnea     Right carpal tunnel syndrome     Left hand weakness     Tennis elbow     Benign essential hypertension     Obesity due to excess calories     New-onset angina     Coronary artery disease due to lipid rich plaque     NSTEMI (non-ST elevated myocardial infarction)     Tobacco use     Past Medical History:   Diagnosis Date     Diabetes mellitus     adult onset     GERD (gastroesophageal reflux disease)      Hyperlipidemia      Hypertension      Kidney stones      Liver disease      Neuropathy of foot      Obesity      DEWAYNE on CPAP      Peripheral Neuropathy     Created by Conversion      Sleep apnea            Physical Assessment  Precautions/ Physical Limitations: Peripheral Neuropathy  Oxygen: No  O2 Sats: 96-98% Lung Sounds: Clear Edema:   Incisions:   Sleeping Pattern: good   Appetite: good   Nutrition Risk Screen: Weight loss    Pain  Location: Feet and lower legs  Characteristics:Constant--Neuropathy pain  Intensity: (0-10 scale) 7  Current Pain Management:   Intervention:   Response:     Psychosocial/ Emotional Health  1. In the past 12 months, have you been in a relationship where you have been abused physically, emotionally, sexually or financially? No  notified: NA  2.  Who do you turn to for emotional support?: Wife  3. Do you have cultural or spiritual needs? No  4. Have there been any major life changes in the past 12 months? No    Referral Information  Primary Physician: Jovita Nunez MD  Cardiologist: Dr. Barber  Surgeon:     Home exercise/Equipment: None    Patient's long-term goal(s): Resume all previous tasks,Travel    1. Living Accommodations: Apartment Steps: Yes      Support people at home: Wife   2. Marital Status:   3. Family is able to assist with cares      Yazidism/Community involvement: Yes  4. Recreation/Hobbies: Camping/ Travel; Movies        See Doc Flowsheet

## 2021-06-08 NOTE — PROGRESS NOTES
Assessment/Plan:     1.  Coronary artery disease: He was hospitalized January 23 to January 25 with chest pain.  Coronary angiogram showed severe disease of LAD and mild to moderate diffuse disease of RCA and left circumflex.  He received a drug-eluting stent to mid LAD.  Dual antiplatelet therapy is being used with aspirin indefinitely and ticagrelor for 6 months.  He explains that ticagrelor was too expensive.  [facility] what sounds like clopidogrel.  I have asked him to call my nurse later today to clarify what medication he will be changing to.  If this is clopidogrel, he will be instructed to take a 300 mg loading dose on his first day.  We discussed the importance of antiplatelet therapy and talking with his cardiologist prior to stopping these medications for any reason.  Puncture site is well-healed.      Risk factor modification and lifestyle management topics were discussed including managing comorbidities, weight loss, heart healthy diet, exercise and smoking cessation.      2.  Dyslipidemia: Man Schafferst Geiger is on high intensity statin therapy with atorvastatin 80 mg daily.  His LDL is 64.  We discussed a diet low in saturated fat, weight loss, and exercise along with medication for better control of cholesterol.     3.  Hypertension: His blood pressure is controlled today taking metoprolol and lisinopril.    4.  Diabetes:  We discussed the importance of tightly controlled blood sugars to minimize risk of worsening coronary artery disease.  Primary care provider to manage diabetes.    5.  Obstructive sleep apnea: He wears his CPAP nightly and follows up regularly with his sleep medicine doctor.    Joe will follow-up with Dr. Barber on February 27.    Subjective:     Man Acevedo  is seen at [facility] for post coronary intervention follow up.  He was hospitalized January 23 to January 25 with chest pain.  Coronary angiogram showed  severe disease of LAD and mild to moderate diffuse disease of RCA and left circumflex.  He received a drug-eluting stent to mid LAD.  Dual antiplatelet therapy is being used with aspirin indefinitely and ticagrelor for 6 months.  LV gram showed normal ejection fraction.  Coronary artery disease risk factors for Joe include hypertension, obesity, dyslipidemia, diabetes, smoking, physical inactivity and unhealthy diet.  His past medical history is also significant for nonalcoholic fatty liver and obstructive sleep apnea.    He denies any chest pain since PCI.  He is participating in cardiac rehab and walking 45 minutes daily.  He has mild shortness of breath with walking but denies any other symptoms.  He has daytime sleepiness which has been an ongoing problem for the past 4-5 months.  He uses his CPAP nightly.  He denies lightheadedness, orthopnea and lower extremity edema.      He is working on following a heart healthy diet.  He was previously smoking 1 pack per day but has quit since PCI.    Review of Systems:   General: Night Sweats  Eyes: WNL  Ears/Nose/Throat: WNL  Lungs: WNL  Heart: Shortness of Breath with activity  Stomach: WNL  Bladder: Frequent Urination at Night  Muscle/Joints: Joint Pain  Skin: WNL  Nervous System: Daytime Sleepiness  Mental Health: WNL     Blood: WNL     Patient Active Problem List   Diagnosis     Peripheral Neuropathy     Nonalcoholic Fatty Liver Disease     Lower Back Pain     Adjustment Disorder With Anxiety And Depressed Mood     Neck Pain     Dyslipidemia     Uncontrolled type 2 diabetes mellitus with other diabetic kidney complication, unspecified long term insulin use status     Pain in joint, ankle and foot     Obstructive sleep apnea     Right carpal tunnel syndrome     Left hand weakness     Tennis elbow     Benign essential hypertension     Obesity due to excess calories     Coronary artery disease due to lipid rich plaque     NSTEMI (non-ST elevated myocardial infarction)  "    Tobacco use       Past Medical History:   Diagnosis Date     Diabetes mellitus     adult onset     GERD (gastroesophageal reflux disease)      Hyperlipidemia      Hypertension      Kidney stones      Liver disease      Neuropathy of foot      Obesity      DEWAYNE on CPAP      Peripheral Neuropathy     Created by Conversion      Sleep apnea        Past Surgical History:   Procedure Laterality Date     CARPAL TUNNEL RELEASE Right 04/12/2016     KIDNEY STONE SURGERY       KNEE ARTHROSCOPY Left 8/6/15    Partial medial meniscectoy[facility] body [facility]     LEG SURGERY       CT AMPUTATE METACARPAL+FINGER      Description: Metacarpal Amputation And Index Finger;  Recorded: 08/15/2014;  Comments: For \"cartilage cancer\"     CT CATH PLACEMENT & NJX CORONARY ART ANGIO IMG S&I N/A 1/24/2017    Procedure: Coronary Angiogram;  Surgeon: Melissa Freeman MD;  Location: [facility];  Service: Cardiology     CT L HRT CATH W/NJX L VENTRICULOGRAPHY IMG S&I N/A 1/24/2017    Procedure: Left Heart Catheterization with Left Ventriculogram;  Surgeon: Melissa Freeman MD;  Location: [facility] Cath [facility];  Service: Cardiology     CT LAP,CHOLECYSTECTOMY      Description: Cholecystectomy Laparoscopic;  Proc Date: 12/11/2013;     CT PERCUT REMV KID STONE,UP TO 2 CM      Description: Percutaneous Lithotomy;  Proc Date: 11/05/2012;       No family history on file.    Social History     Social History     Marital status:      Spouse name: N/A     Number of children: N/A     Years of education: N/A     Occupational History     Not on file.     Social History Main Topics     Smoking status: Former Smoker     Packs/day: 0.50     Smokeless tobacco: Never Used      Comment: Patient reports he is trying to quit.     Alcohol use No     Drug use: No     Sexual activity: Not on file     Other Topics Concern     Not on file     Social History Narrative       Current Outpatient Prescriptions   Medication Sig Dispense " Refill     aspirin 81 MG EC tablet Take 81 mg by mouth daily.       atorvastatin (LIPITOR) 80 MG tablet Take 1 tablet (80 mg total) by mouth daily. 30 tablet 1     cyanocobalamin 1000 MCG tablet Take 1,000 mcg by mouth daily.       insulin aspart (NOVOLOG) 100 unit/mL injection Inject 40 Units under the skin 3 (three) times a day before meals.       insulin glargine (LANTUS) 100 unit/mL injection Inject 65 Units under the skin 2 (two) times a day.       liraglutide (VICTOZA) 0.6 mg/0.1 mL (18 mg/3 mL) PnIj injection Inject 1.2 mg under the skin daily.       lisinopril (PRINIVIL,ZESTRIL) 40 MG tablet Take 40 mg by mouth daily.       metoprolol succinate (TOPROL XL) 25 MG Take 1 tablet (25 mg total) by mouth daily. 30 tablet 0     minocycline (MINOCIN,DYNACIN) 100 MG capsule Take 100 mg by mouth 2 (two) times a day.       OMEGA-3/DHA/EPA/FISH OIL (FISH OIL-OMEGA-3 FATTY ACIDS) 300-1,000 mg capsule Take 1 g by mouth daily.       potassium citrate (UROCIT-K) 5 mEq (540 mg) SR tablet Take 15 mEq by mouth every morning.       potassium citrate (UROCIT-K) 5 mEq (540 mg) SR tablet Take 30 mEq by mouth bedtime.       syringe accessory Misc Inject 3 each as directed.       tamsulosin (FLOMAX) 0.4 mg Cp24 Take 1 capsule (0.4 mg total) by mouth daily. 14 capsule 0     ticagrelor (BRILINTA) 90 mg Tab Take 1 tablet (90 mg total) by mouth 2 (two) times a day. 60 tablet 1     metFORMIN (GLUCOPHAGE) 1000 MG tablet Take 1 tablet (1,000 mg total) by mouth 2 (two) times a day with meals. 60 tablet 6     No current facility-administered medications for this visit.        Allergies   Allergen Reactions     Gabapentin        Objective:     Vitals:    02/14/17 0756   BP: 132/80   Pulse: 80   Resp: 18     Body mass index is 34.59 kg/(m^2).      General Appearance:   Alert, cooperative and in no acute distress.   HEENT:  No scleral icterus; the mucous membranes were pink and moist.   Chest: The spine was straight. The chest was symmetric.    Lungs:   Respirations unlabored; the lungs are clear to auscultation.   Cardiovascular:   Regular rhythm. S1 and S2 without murmur, clicks or rubs. Carotid pulses are intact and symmetrical.  No carotid bruits noted.   Abdomen:  Soft, nontender, nondistended, bowel sounds present   Extremities: No cyanosis, clubbing, or edema.   Skin: No xanthelasma.   Neurologic: Mood and affect are appropriate.   Puncture site: Right radial site is soft with no bruising.  Radial pulses and Pedal pulses intact and symmetrical.  CMS intact.         Lab Review   Lab Results   Component Value Date    CREATININE 1.03 01/25/2017    BUN 16 01/25/2017     01/25/2017    K 4.0 01/25/2017     (H) 01/25/2017    CO2 21 (L) 01/25/2017     CREATININE (mg/dL)   Date Value   01/25/2017 1.03   01/24/2017 1.06   01/23/2017 0.96   11/23/2014 1.01         25 minutes were spent with the patient with greater than 50% spent on education and counseling.      Yvonne Gilliland, [facility]

## 2021-06-08 NOTE — PROGRESS NOTES
ITP ASSESSMENT   Assessment Day: Initial  Session Number: 1  Precautions: S/P MI Precautions  Diagnosis: MI;Stent  Risk Stratification: High  Referring Provider: Melissa Freeman MD  EXERCISE  Exercise Assessment: Initial     6 Minute Walk Test   Pre   Pre Exercise HR: 76                  Pre Exercise BP: 161/87    Peak  Peak HR: 93                 Peak BP: 159/78  Peak feet: 1250  Peak O2 SAT: 98  Peak RPE: 13  Peak MPH: 2.37    Symptoms:  Peak Symptoms: Minimal reports of SOB;  Foot discomfort- No Change    5 mins. Post  5 Min Post HR: 74  5 Min Post BP: 138/80                         Exercise Plan  Goals Next 30 days  ADL'S: Moderate housework, without SOB;  Leisure: Play with Grand kids;  Walk 2-3 x day 15-20'  Work: Has resumed PT work      Education Goals: Patient can state cardiac s/s and appropriate emergency response.;Has system for taking medication.;Medication review  Education Goals Met: Patient can state cardiac s/s and appropriate emergency response.;Has system for taking medication.    Exercise Prescription  Exercise Mode: Arm Erg.;Nustep;Bike;Treadmill  Frequency: 2 x week  Duration: 30-40'  Intensity / THR: 20-30 beats above resting heart rate  RPE 11-14  Progression / Met level: 2.3-2.8  Resistive Training?: No (Will review in the future)    Current Exercise (mins/week): 100    Interventions  Home Exercise:  Mode: Walk  Frequency: 2-3 x day  Duration: 15-20'    Education Material : Educational videos;Provide written material;Individual education and counseling;Offer educational classes    Education Completed  Exercise Education Completed: Signs and Symptoms;RPE;Emergency Plan;Home Exercise;Warm up/cool down;FITT Principles;BP/HR Reponse to exercise;Benefits of Exercise;End point of exercise            Exercise Follow-up/Discharge  Follow up/Discharge: Encouraged walking 2-3 x day 10-20' as tolerated NUTRITION  Nutrition Assessment: Initial    Nutrition Risk Factors:  Nutrition Risk Factors:  "Diabetes;Overweight;Dyslipidemia  HbA1c: 9.1  Monitors blood sugar at home: Yes  Frequency: 2 x  day  Cholesterol: 140  LDL: 64  HDL: 22  Triglycerides: 269    Nutrition Plan  Interventions  Nutrition Interventions: Diet consult;Diet class;Therapist/Patient discussion;Educational videos;Provide with written material      Education Completed  Nutrition Education Completed: Risk factor overview    Goals  Nutrition Goals (Next 30 days): Provide Rate your Plate Survey;Review Dietitian schedule    Goals Met  Nutrition Goals Met: Provided Rate your Plate Survey;Reviewed Dietitian schedule    Height, Weight, and  BMI  Weight: 248 lb (112.5 kg)  Height: 5' 11\" (1.803 m)  BMI: 34.6    Nutrition Follow-up  Follow-up/Discharge: Encouraged pt to complete Diet Habit Survey       Other Risk Factors  Other Risk Factor Assessment: Initial    HTN Risk Factor: Hypertension    Pre Exercise BP: 161/87  Post Exercise BP: 138/80    Hypertension Plan  Goals  HTN Goals: Follow low sodium diet;Take medication as prescribed;Exercises regularly    Goals Met  HTN Goals Met: Take medication as prescribed    HTN Interventions  HTN Interventions: Diet consult;Therapist/patient discussion;Provide written material    HTN Education Completed  HTN Education Completed: Risk factor overview    Tobacco Risk Factor: Tobacco    Initial Use:: 1 pack per day  Current Use:: 0    Tobacco Plan  Tobacco Goals  Tobacco Goals: Patient remain tobacco free    Goals Met  Tobacco Goals Met: Patient remain tobacco free    Tobacco Interventions  Tobacco Interventions: Therapist/patient discussion;Provide written material    Tobacco Education Completed  Tobacco Education Completed: Health benefits of tobacco cessation;Risk factor overview    Risk Factor Follow-up   Follow-up/Discharge: Encourage pt to reamin smoke free   PSYCHOSOCIAL  Psychosocial Assessment: Initial     [facility] Q [facility] L Summary Score: 16    MAYA-D Score: 0    Psychosocial Risk Factor: " Stress    Psychosocial Plan  Interventions    Interventions: Offer educational videos and classes;Provide written material;Individual education and counseling    Education Completed  Education Completed: Relaxation/Coping Techniques    Goals  Goals (Next 30 days): Identified Support system;Oriented to stress management classes;Identify stressors;Practicing stress management skills    Goals Met  Goals Met: Identified Support system;Oriented to stress management classes;Identify stressors    Psychosocial Follow-up  Follow-up/Discharge: Reports he does deep breathing for stress reduction           Patient involved in Goal setting?: Yes    Signature: _____________________________________________________________    Date: __________________    Time: ________________

## 2021-06-08 NOTE — TELEPHONE ENCOUNTER
Pemafibrate to Reduce cardiovascular OutcoMes by reducing triglycerides IN patiENts with diabeTes (PROMINENT) clinical trial.    Study telephone visit form:    Subject Number:   [id number]                                     Dr. Barber- PI      CONCOMITANT MEDICATIONS  ? Investigator to report if participant needs treatment with prohibited medications (Fibrates or other agents with PPAR-? agonist activity (e.g., saroglitazar); See protocol for exclusionary medications and actions to be taken.    VISIT SCHEDULING AND CONTACT CONFIRMATION  ? Confirm date of next study visit   ? Confirm information on Subject Information Form or update as needed      Visit Number:___V20____  Visit Date: 2020      Was the subject, relatives or health care provider (as per consent) contacted by phone?    [x] Yes  [] No    Yes [x]  No []   If Yes, Date of Contact:   Date: ___ ____ ____   DD [facility] YYYY  If No, please document attempts to contact subject (include date and type of contact)   Date: ____ ____ ____   Type: [] Tel _ [] Other___  [] 2 Date:      Name of Person: Contacted:   _____Self/Subject_______________   Relationship to Subject:   ______________________  Type:[]  Tel[x]  Other _______   Date: ___ ____ ____   Type: [] Tel [] Other _______    Subject Status on the day assessed     [x] Subject alive  []  Subject    [] Unknown at present Please complete  Death Details Form in InForm and submit source documents to [facility]        Review Subject Contact Information Form and update as needed   [x]  Review concomitant medication use   [x]  Query participant and record any reported AEs  [Updated that URI reported at last visit starting 2020 resolved by 3/20/2020]  [x] Query participant and record any CV efficacy events   [x] Confirm next study visit: __TBA___________   [x] Instruct participants to bring all study supplies with them to the next in-person visit    Instruct participants to bring all study supplies  with them to the next in-person visit   Instruct participants to fast for ? 8 hours immediately prior to next in-person visit (i.e nothing by mouth, except water and essential medications).     Presley Torres RN

## 2021-06-09 NOTE — PATIENT INSTRUCTIONS - HE
Man Acevedo Sr.,    It was a pleasure to see you today at the [facility].     My recommendations after this visit include:    - Follow up with Dr. Barber in 4-6 weeks    - Please call  [phone], if you have any questions or concerns    Cristofer Narayanan, JENNIFER

## 2021-06-09 NOTE — PROGRESS NOTES
Pemafibrate to Reduce cardiovascular OutcoMes by reducing triglycerides IN patiENts with diabeTes (PROMINENT) clinical trial.    Subject contacted by telephone for study Visit 21 due to Covid 19 pandemic and site guidelines.    Explained the current Covid 19 measures at the site and modifications to the study plan with subject.   Subject denies fever, cough, shortness of breath, or contact with known positive or person under investigation with Covid 19.     There were no vitals filed for this visit per telephone visit.  Waist measurement not done per telephone visit.  Labs not drawn per telephone visit.    Study efficacy, endpoints and adverse events reviewed with subject.  Cardiovascular risk discussed.     Study alcohol consumption stipulations and education reviewed with subject:    Subject reports  [] Never [x] Current [] Former   1drinks per [] Day [] Week [] Month [x] Occasional.  1 maximum drinks per sitting.     Subject called by provider Cristofer Narayanan for study related telephone exam.    Subject was met at site entrance and provided consent form and study medication exchange.    Study medication box # [id number] with 0 tabs  Study medication box # [id number] with 0 tabs  Study medication box # [id number] with 0 tabs  Study medication box # [id number] with 20 tabs  returned by subject.  Total tablet count of 20 for 100 % compliance.  Study medication box #'s [id number], [id number], [id number], [id number] dispensed to subject.  Education provided on medication compliance and administration    Plan: Study Visit 22 telephone call with subject in 2 months.  Will schedule study  Visit 23 with subject in 4 months back at [facility].    Presley Torres RN   Clinical Trials Nurse

## 2021-06-09 NOTE — PROGRESS NOTES
Man Acevedo Sr. has participated in 4 sessions of Phase II Cardiac Rehab.    Progress Report:   Cardiac Rehab Treatment Progress Report 2/6/2017 2/10/2017 2/14/2017 2/20/2017 2/24/2017   Weight 248 lbs 246 lbs 3 oz 248 lbs 243 lbs 245 lbs   Pre Exercise  HR 76 71 - 81 83   Pre Exercise /87 156/86 - 128/72 136/70   Pre Blood Sugar (mg/dl) 191 149 - 130 137   Treadmill Peak HR - 97 - 101 100   Nustep Peak Heart Rate - 91 - 100 91   Nustep Peak Blood Pressure - 148/78 - 152/70 148/80   Heart Rate 74 78 - 88 83   Post Exercise /80 160/100 - 120/70 140/72   Post Blood Sugar (mg/dl) 164 99 - 88 107   ECG NSR SR - SR SR   Total Exercise Minutes 6 26 - 36 40         Current Status:  Currently exercising without complaints or symptoms.    If Physician recommends change in treatment plan, please place orders.        __________________________________________________      _____________  Signature                                                                                                  DateSee Doc Flowsheet

## 2021-06-09 NOTE — TELEPHONE ENCOUNTER
Reminder call for study visit next week as well as covid19 pre-veisit questions.  Will update us if anything changes.    Presley Torres RN

## 2021-06-10 NOTE — TELEPHONE ENCOUNTER
Wellness Screening Tool  Symptom Screening:  Do you have one of the following NEW symptoms:    Fever (subjective or >100.0)?  No    A new cough?  No    Shortness of breath?  No     Chills? No     New loss of taste or smell? No     Generalized body aches? No     New persistent headache? No     New sore throat? No     Nausea, vomiting, or diarrhea?  No    Within the past 3 weeks, have you been exposed to someone with a known positive illness below:    COVID-19 (known or suspected)?  No    Chicken pox?  No    Mealses?  No    Pertussis?  No    Patient notified of visitor policy- They may have one person accompany them to their appointment, but they will need to wear a mask and will be screened upon arrival for symptoms: Yes  Pt informed to wear a mask: Yes  Pt notified if they develop any symptoms listed above, prior to their appointment, they are to call the clinic directly at [phone] for further instructions.  Yes  Patient's appointment status: Patient will be seen in clinic as scheduled on 1250 8/17 at [facility]

## 2021-06-10 NOTE — PATIENT INSTRUCTIONS - HE
Mr Man Acevedo Sr.,  I enjoyed visiting with you again today.  I am concerned with the shortness of breath.  Per our conversation I will check the stress test on the heart and the heart monitor.  I will plan on seeing you 1 year or sooner if needed.  Uday Barber

## 2021-06-10 NOTE — PROGRESS NOTES
On 3/10/17 Pt called and canceled cardiac rehab d/t work conflict. Plans to continue home exercise on his own.

## 2021-06-10 NOTE — PROGRESS NOTES
[facility] Follow-up Note    Assessment & Plan        1. Coronary artery disease due to lipid rich plaque -angiography January 24, 2017 showed 30% left main lesion, mid left anterior descending 30% lesion followed by a 95% lesion the received a drug-coated stent, distal left anterior descending 20% lesion and second diagonal 70% lesion.  The ramus had a 50% lesion, circumflex had a proximal 40% lesion, and the right coronary artery had a proximal 20 and mid 20% lesions.  He is having a vague discomfort in his arm which could be muscle related but over for nuclear stress test to make sure it is not diagonal ischemia.     2. NSTEMI (non-ST elevated myocardial infarction) -acute coronary syndrome of the anterior wall secondary to above.     3. Tobacco use -discontinued.     4. Benign essential hypertension -blood pressure under good control.     5. Obstructive sleep apnea -he uses an APAP machine and getting along well.     6. Uncontrolled type 2 diabetes mellitus with other diabetic kidney complication, unspecified long term insulin use status -on medications and defer to primary.     7. Dyslipidemia -LDL excellent at 64 with a high triglyceride of 269.  Will refer name to surgery for possible prominent trial.     8.  Obesity-weight loss.    Plan  1.  Exercise nuclear stress test and if abnormal angiography for diagonal disease.  2.  Weight loss.  3.  Follow-up with me in about July which will be 6 months out from heart attack.  4.  Consider prominent trial.    Subjective  CC: 57-year-old white gentleman being seen in post hospital discharge follow-up today.  He comes in tell me of a left arm discomfort that comes on after raking or carrying in groceries.  It goes away on its own.  There is no chest discomfort, shortness breath, PND, orthopnea or peripheral edema.    Medications  Current Outpatient Prescriptions   Medication Sig Note     aspirin 81 MG EC tablet Take 81 mg by mouth daily.       "atorvastatin (LIPITOR) 80 MG tablet Take 1 tablet (80 mg total) by mouth daily.      cyanocobalamin 1000 MCG tablet Take 1,000 mcg by mouth daily.      insulin aspart (NOVOLOG) 100 unit/mL injection Inject 40 Units under the skin 3 (three) times a day before meals. 2/6/2017: Reports he takes 35 U ; 3 x day, with meals     insulin glargine (LANTUS) 100 unit/mL injection Inject 65 Units under the skin 2 (two) times a day. 2/6/2017: Reports he takes 60 U -2 x day     liraglutide (VICTOZA) 0.6 mg/0.1 mL (18 mg/3 mL) PnIj injection Inject 1.2 mg under the skin daily.      lisinopril (PRINIVIL,ZESTRIL) 40 MG tablet Take 40 mg by mouth daily.      metFORMIN (GLUCOPHAGE) 1000 MG tablet Take 1 tablet (1,000 mg total) by mouth 2 (two) times a day with meals.      metoprolol succinate (TOPROL XL) 25 MG Take 1 tablet (25 mg total) by mouth daily.      minocycline (MINOCIN,DYNACIN) 100 MG capsule Take 100 mg by mouth 2 (two) times a day as needed.       OMEGA-3/DHA/EPA/FISH OIL (FISH OIL-OMEGA-3 FATTY ACIDS) 300-1,000 mg capsule Take 1 g by mouth daily.      potassium citrate (UROCIT-K) 5 mEq (540 mg) SR tablet Take 15 mEq by mouth every morning.      potassium citrate (UROCIT-K) 5 mEq (540 mg) SR tablet Take 30 mEq by mouth bedtime.      syringe accessory Misc Inject 3 each as directed.      tamsulosin (FLOMAX) 0.4 mg Cp24 Take 1 capsule (0.4 mg total) by mouth daily.      clopidogrel (PLAVIX) 75 mg tablet Take 1 tablet (75 mg total) by mouth daily.        Objective  /86 (Patient Site: Left Arm, Patient Position: Sitting, Cuff Size: Adult Large)  Pulse 80  Resp 16  Ht 5' 11.5\" (1.816 m)  Wt (!) 248 lb (112.5 kg)  BMI 34.11 kg/m2    General Appearance:    Alert, cooperative, no distress, appears stated age, moderately obese    Head:    Normocephalic, without obvious abnormality, atraumatic   Throat:   Lips, mucosa, and tongue normal; teeth and gums normal   Neck:   Supple, symmetrical, trachea midline, no adenopathy;  "       thyroid:  No enlargement/tenderness/nodules; no carotid    bruit or JVD   Back:     Symmetric, no curvature, ROM normal, no CVA tenderness   Lungs:     Clear to auscultation bilaterally, respirations unlabored   Chest wall:    No tenderness or deformity   Heart:    Regular rate and rhythm, S1 and S2 normal, no murmur, rub   or gallop   Abdomen:     Soft, non-tender, bowel sounds active all four quadrants,     no masses, no organomegaly   Extremities:   Normal, atraumatic, no cyanosis or edema   Pulses:   2+ and symmetric all extremities   Skin:   Skin color, texture, turgor normal, no rashes or lesions     Results    Lab Results personally reviewed   Lab Results   Component Value Date    CHOL 140 01/25/2017    CHOL 149 01/24/2017     Lab Results   Component Value Date    HDL 22 (L) 01/25/2017    HDL 26 (L) 01/24/2017     Lab Results   Component Value Date    LDLCALC 64 01/25/2017    LDLCALC 57 01/24/2017     Lab Results   Component Value Date    TRIG 269 (H) 01/25/2017    TRIG 330 (H) 01/24/2017     Lab Results   Component Value Date    WBC 8.1 01/24/2017    HGB 15.4 01/25/2017    HCT 42.1 01/24/2017     01/25/2017     Lab Results   Component Value Date    CREATININE 1.03 01/25/2017    BUN 16 01/25/2017     01/25/2017    K 4.0 01/25/2017    CO2 21 (L) 01/25/2017     Review of Systems:   General: WNL  Eyes: WNL  Ears/Nose/Throat: WNL  Lungs: Shortness of Breath  Heart: Arm Pain, Shortness of Breath with activity  Stomach: WNL  Bladder: Frequent Urination at Night  Muscle/Joints: Joint Pain, Muscle Weakness, Muscle Pain  Skin: WNL  Nervous System: Daytime Sleepiness  Mental Health: WNL     Blood: WNL

## 2021-06-11 ENCOUNTER — RECORDS - HEALTHEAST (OUTPATIENT)
Dept: CARDIOLOGY | Facility: CLINIC | Age: 61
End: 2021-06-11

## 2021-06-11 NOTE — PROGRESS NOTES
Assessment/Plan:     No changes to his medications today. He will continue to follow-up with research per protocol for PROMINENT study.    Subjective:     Man JOSEPH Acevedo Sr. is seen at [facility] today for PROMINENT study screening visit.  He has no health concerns today and denies fatigue, lightheadedness, shortness of breath, dyspnea on exertion, orthopnea, PND, palpitations, chest pain, abdominal fullness/bloating and lower extremity edema.      Patient Active Problem List   Diagnosis     Peripheral Neuropathy     Nonalcoholic Fatty Liver Disease     Lower Back Pain     Adjustment Disorder With Anxiety And Depressed Mood     Neck Pain     Dyslipidemia     Uncontrolled type 2 diabetes mellitus with other diabetic kidney complication, unspecified long term insulin use status     Pain in joint, ankle and foot     Obstructive sleep apnea     Right carpal tunnel syndrome     Left hand weakness     Tennis elbow     Benign essential hypertension     Obesity due to excess calories     Coronary artery disease due to lipid rich plaque     NSTEMI (non-ST elevated myocardial infarction)     Tobacco use       Past Medical History:   Diagnosis Date     Coronary artery disease      Diabetes mellitus 2002    adult onset     Diabetes mellitus, type II      GERD (gastroesophageal reflux disease)      High cholesterol      Hyperlipidemia      Hypertension      Kidney stones      Liver disease      Myocardial infarction      Neuropathy of foot      Obesity      DEWAYNE on CPAP      Peripheral Neuropathy     Created by Conversion      Sleep apnea        Past Surgical History:   Procedure Laterality Date     CARDIAC CATHETERIZATION       CARPAL TUNNEL RELEASE Right 04/12/2016     CORONARY STENT PLACEMENT       KIDNEY STONE SURGERY       KNEE ARTHROSCOPY Left 8/6/15    [facility]ct[facility]     [facility]G SURGERY       AL AMPUTATE METACARPAL+FINGER      Description: Metacarpal  "Amputation And Index Finger;  Recorded: 08/15/2014;  Comments: For \"cartilage cancer\"     CO CATH PLACEMENT & NJX CORONARY ART ANGIO IMG S&I N/A 1/24/2017    Procedure: Coronary Angiogram;  Surgeon: Melissa Freeman MD;  Location: [facility];  Service: Cardiology     CO L HRT CATH W/NJX L VENTRICULOGRAPHY IMG S&I N/A 1/24/2017    Procedure: Left Heart Catheterization with Left Ventriculogram;  Surgeon: Melissa Freeman MD;  Location: [facility] Cath Lab;  Service: Cardiology     CO LAP,CHOLECYSTECTOMY      Description: Cholecystectomy Laparoscopic;  Proc Date: 12/11/2013;     CO PERCUT REMV KID STONE,UP TO 2 CM      Description: Percutaneous Lithotomy;  Proc Date: 11/05/2012;       History reviewed. No pertinent family history.    Social History     Social History     Marital status:      Spouse name: N/A     Number of children: N/A     Years of education: N/A     Occupational History     Not on file.     Social History Main Topics     Smoking status: Former Smoker     Packs/day: 0.50     Smokeless tobacco: Never Used      Comment: Patient reports he is trying to quit.     Alcohol use No     Drug use: No     Sexual activity: Not on file     Other Topics Concern     Not on file     Social History Narrative       Current Outpatient Prescriptions   Medication Sig Dispense Refill     aspirin 81 MG EC tablet Take 81 mg by mouth daily.       atorvastatin (LIPITOR) 80 MG tablet Take 1 tablet (80 mg total) by mouth daily. 30 tablet 1     cholecalciferol, vitamin D3, 1,000 unit tablet Take 1,000 Units by mouth daily.       clopidogrel (PLAVIX) 75 mg tablet Take 1 tablet (75 mg total) by mouth daily.  0     cyanocobalamin 1000 MCG tablet Take 1,000 mcg by mouth daily.       insulin glargine (LANTUS) 100 unit/mL injection Inject 65 Units under the skin 2 (two) times a day.       liraglutide (VICTOZA) 0.6 mg/0.1 mL (18 mg/3 mL) PnIj injection Inject 1.2 mg under the skin daily.       lisinopril " (PRINIVIL,ZESTRIL) 40 MG tablet Take 40 mg by mouth daily.       metFORMIN (GLUCOPHAGE) 1000 MG tablet Take 1 tablet (1,000 mg total) by mouth 2 (two) times a day with meals. 60 tablet 6     metoprolol succinate (TOPROL XL) 25 MG Take 1 tablet (25 mg total) by mouth daily. 30 tablet 0     minocycline (MINOCIN,DYNACIN) 100 MG capsule Take 100 mg by mouth 2 (two) times a day as needed.        OMEGA-3/DHA/EPA/FISH OIL (FISH OIL-OMEGA-3 FATTY ACIDS) 300-1,000 mg capsule Take 1 g by mouth daily.       potassium citrate (UROCIT-K) 5 mEq (540 mg) SR tablet Take 15 mEq by mouth every morning.       potassium citrate (UROCIT-K) 5 mEq (540 mg) SR tablet Take 30 mEq by mouth bedtime.       syringe accessory Misc Inject 3 each as directed.       tamsulosin (FLOMAX) 0.4 mg Cp24 Take 1 capsule (0.4 mg total) by mouth daily. 14 capsule 0     insulin aspart (NOVOLOG) 100 unit/mL injection Inject 40 Units under the skin 3 (three) times a day before meals.       Current Facility-Administered Medications   Medication Dose Route Frequency Provider Last Rate Last Dose     Study Drug pemafibrate 0.2 mg/placebo tablet 0.2 mg (PROMINENT)  0.2 mg Oral BID Yeny Barber MD           Allergies   Allergen Reactions     Gabapentin      Tolmetin Unknown     Pt is on Plavix       Objective:     Vitals:    06/23/17 0803   BP: 134/82   Pulse: 72   Resp: 18     Wt Readings from Last 3 Encounters:   06/23/17 (!) 247 lb (112 kg)   06/05/17 (!) 243 lb 12.8 oz (110.6 kg)   05/01/17 (!) 248 lb (112.5 kg)       General Appearance:   Alert, cooperative and in no acute distress.   HEENT:  No scleral icterus, EOM intact, PERRL; the mucous membranes were pink and moist. Cervical lymph nodes nontender and nonpalpable.   Neck: Supple.  JVP normal.  No HJR.  No obvious thyromegaly.     Chest: The spine was straight. The chest was symmetric.   Lungs:   Respirations unlabored; the lungs are clear to auscultation.   Cardiovascular:   Regular rhythm. S1 and S2  without murmur, clicks or rubs. Radial, carotid and posterior tibial pulses are intact and symmetrical.  No carotid bruits noted.   Abdomen:  Soft, nontender, nondistended, bowel sounds present   Extremities: No cyanosis, clubbing, or edema.   Musculoskeletal:  Moves all extremities.   Skin: No bruising or wounds.   Neurologic: Mood and affect are appropriate.             Julia Marie CNP    This note has been dictated using voice recognition software. Any grammatical, typographical, or context distortions are unintentional and inherent to the software.

## 2021-06-11 NOTE — PROGRESS NOTES
Assessment/Plan:     No changes to his medications today. He will continue to follow-up with research per protocol for PROMINENT study.    Subjective:     Man JOSEPH Acevedo Sr. is seen at [facility] today for PROMINENT study randomization visit. Pemafibrate to Reduce cardiovascular OutcoMes by reducing triglycerides IN patiENts with diabeTes (PROMINENT) clinical trial.  He has a history of coronary artery disease with previous MI and stents, hypertension, type 2 diabetes, and hyperlipidemia.  He had a syncopal episode on Saturday; he was in the bathroom shaving when he turned around and the next thing he was aware of was waking up on the floor.  He checked his blood sugar which was 64, very low for him.  He had a glass of orange juice and within 20 minutes was feeling better with a blood sugar of 98 and rising.  He bumped his head, but had no significant injuries.  He denies any change in mental status.  He has since felt very well.  He denies fatigue, lightheadedness, shortness of breath, dyspnea on exertion, orthopnea, PND, palpitations, chest pain, abdominal fullness/bloating and lower extremity edema.      Patient Active Problem List   Diagnosis     Peripheral Neuropathy     Nonalcoholic Fatty Liver Disease     Lower Back Pain     Adjustment Disorder With Anxiety And Depressed Mood     Neck Pain     Dyslipidemia     Uncontrolled type 2 diabetes mellitus with other diabetic kidney complication, unspecified long term insulin use status     Pain in joint, ankle and foot     Obstructive sleep apnea     Right carpal tunnel syndrome     Left hand weakness     Tennis elbow     Benign essential hypertension     Obesity due to excess calories     Coronary artery disease due to lipid rich plaque     NSTEMI (non-ST elevated myocardial infarction)     Tobacco use       Past Medical History:   Diagnosis Date     Coronary artery disease      Diabetes mellitus 2002    adult onset     Diabetes mellitus, type II       "GERD (gastroesophageal reflux disease)      High cholesterol      Hyperlipidemia      Hypertension      Kidney stones      Liver disease      Myocardial infarction      Neuropathy of foot      Obesity      DEWAYNE on CPAP      Peripheral Neuropathy     Created by Conversion      Sleep apnea        Past Surgical History:   Procedure Laterality Date     CARDIAC CATHETERIZATION       CARPAL TUNNEL RELEASE Right 04/12/2016     CORONARY STENT PLACEMENT       KIDNEY STONE SURGERY       KNEE ARTHROSCOPY Left 8/6/15    Partial medial meniscectoy[facility]     LEG SURGERY       VA AMPUTATE METACARPAL+FINGER      Description: Metacarpal Amputation And Index Finger;  Recorded: 08/15/2014;  Comments: For \"cartilage cancer\"     VA CATH PLACEMENT & NJX CORONARY ART ANGIO IMG S&I N/A 1/24/2017    Procedure: Coronary Angiogram;  Surgeon: Melissa Freeman MD;  Location: [facility] Cath Lab;  Service: Cardiology     VA L HRT CATH W/NJX L VENTRICULOGRAPHY IMG S&I N/A 1/24/2017    Procedure: Left Heart Catheterization with Left Ventriculogram;  Surgeon: Melissa Freeman MD;  Location: [facility] Cath Lab;  Service: Cardiology     VA LAP,CHOLECYSTECTOMY      Description: Cholecystectomy Laparoscopic;  Proc Date: 12/11/2013;     VA PERCUT REMV KID STONE,UP TO 2 CM      Description: Percutaneous Lithotomy;  Proc Date: 11/05/2012;       History reviewed. No pertinent family history.    Social History     Social History     Marital status:      Spouse name: N/A     Number of children: N/A     Years of education: N/A     Occupational History     Not on file.     Social History Main Topics     Smoking status: Former Smoker     Packs/day: 0.50     Smokeless tobacco: Never Used      Comment: Patient reports he is trying to quit.     Alcohol use No     Drug use: No     Sexual activity: Not on file     Other Topics Concern     Not on file     Social History Narrative       Current Outpatient Prescriptions "   Medication Sig Dispense Refill     aspirin 81 MG EC tablet Take 81 mg by mouth daily.       atorvastatin (LIPITOR) 80 MG tablet Take 1 tablet (80 mg total) by mouth daily. 30 tablet 1     cholecalciferol, vitamin D3, 1,000 unit tablet Take 1,000 Units by mouth daily.       clopidogrel (PLAVIX) 75 mg tablet Take 1 tablet (75 mg total) by mouth daily.  0     cyanocobalamin 1000 MCG tablet Take 1,000 mcg by mouth daily.       insulin aspart (NOVOLOG) 100 unit/mL injection Inject 40 Units under the skin 3 (three) times a day before meals.       insulin glargine (LANTUS) 100 unit/mL injection Inject 65 Units under the skin 2 (two) times a day.       liraglutide (VICTOZA) 0.6 mg/0.1 mL (18 mg/3 mL) PnIj injection Inject 1.2 mg under the skin daily.       lisinopril (PRINIVIL,ZESTRIL) 40 MG tablet Take 40 mg by mouth daily.       metFORMIN (GLUCOPHAGE) 1000 MG tablet Take 1 tablet (1,000 mg total) by mouth 2 (two) times a day with meals. 60 tablet 6     metoprolol succinate (TOPROL XL) 25 MG Take 1 tablet (25 mg total) by mouth daily. 30 tablet 0     minocycline (MINOCIN,DYNACIN) 100 MG capsule Take 100 mg by mouth 2 (two) times a day as needed.        OMEGA-3/DHA/EPA/FISH OIL (FISH OIL-OMEGA-3 FATTY ACIDS) 300-1,000 mg capsule Take 1 g by mouth daily.       potassium citrate (UROCIT-K) 5 mEq (540 mg) SR tablet Take 15 mEq by mouth every morning.       potassium citrate (UROCIT-K) 5 mEq (540 mg) SR tablet Take 30 mEq by mouth bedtime.       syringe accessory Misc Inject 3 each as directed.       tamsulosin (FLOMAX) 0.4 mg Cp24 Take 1 capsule (0.4 mg total) by mouth daily. 14 capsule 0     Current Facility-Administered Medications   Medication Dose Route Frequency Provider Last Rate Last Dose     Study Drug pemafibrate 0.2 mg/placebo tablet 0.2 mg (PROMINENT)  0.2 mg Oral BID Yeny Barber MD         Study Drug pemafibrate 0.2 mg/placebo tablet 0.2 mg (PROMINENT)  0.2 mg Oral BID Yeny Barber MD            Allergies   Allergen Reactions     Gabapentin      Tolmetin Unknown     Pt is on Plavix       Objective:     Vitals:    07/17/17 0938   BP: 142/82   Pulse: 80   Resp: 16     Wt Readings from Last 3 Encounters:   07/17/17 (!) 248 lb (112.5 kg)   06/23/17 (!) 247 lb (112 kg)   06/05/17 (!) 243 lb 12.8 oz (110.6 kg)       General Appearance:   Alert, cooperative and in no acute distress.   HEENT:  No scleral icterus, EOM intact, PERRL; the mucous membranes were pink and moist. Cervical lymph nodes nontender and nonpalpable.   Neck: Supple.  JVP normal.  No HJR.  No obvious thyromegaly.     Chest: The spine was straight. The chest was symmetric.   Lungs:   Respirations unlabored; the lungs are clear to auscultation.   Cardiovascular:   Regular rhythm. S1 and S2 without murmur, clicks or rubs. Radial, carotid and posterior tibial pulses are intact and symmetrical.  No carotid bruits noted.   Abdomen:  Soft, nontender, nondistended, bowel sounds present   Extremities: No cyanosis, clubbing.  Trace bilateral lower extremity edema.   Musculoskeletal:  Moves all extremities.   Skin: No bruising or wounds.   Neurologic: Mood and affect are appropriate.             Julia Marie CNP    This note has been dictated using voice recognition software. Any grammatical, typographical, or context distortions are unintentional and inherent to the software.

## 2021-06-11 NOTE — PROGRESS NOTES
Pemafibrate to Reduce cardiovascular OutcoMes by reducing triglycerides IN patiENts with diabeTes (PROMINENT) clinical trial.    Subject seen in clinic today for study randomization visit.      See flow sheets and provider note for vitals.  Waist circumference measures 125cm today    Labs drawn per protocol.  Urine sample collected.  Results will be scanned into 'media'.     Subject seen by provider Julia Marie for study related physical exam.    Inclusion & Exclusion criteria reviewed again with subject and they remain eligible to participate.       EQ-[id number] questionnaire completed    Years of schooling subject has completed: 12 years. 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    How many times/week physical activity that works up a sweat: 2    Hx of Diabetic Neuropathy: Yes    Hx of Diabetic Nephropathy: Yes    Hx of Diabetic Retinopathy: No    Hx of Myalgias: Yes    Claudication: No      Medical history, concomitant medications, endpoints and adverse events reviewed with subject.  Cardiovascular risk discussed. Study alcohol consumption stipulations and education reviewed with subject.  Doesn't drink/never drinker.    Demographics             [x]Not  or   [] or    []Not Reported   []Unknown  Race:   [x]White     Study medication box # [id number] returned by subject.  Tablet count of #21 returned for 100% compliance.  Study medication dispensed to subject: Kit # [id number]             Kit # [id number]            Kit # [id number]            Kit # [id number]    Education provided on medication compliance and administration.    Plan: Study Visit 3 telephone call with subject in 2 weeks.  Will schedule study Month 2 Visit 4 with subject in 8 weeks back at [facility].    Presley Torres RN  Clinical Trials Nurse      ADVERSE EVENT: Subject reports Saturday morning he was shaving and passed out.  There were no injuries and he checked his blood sugar at home  which was 64.  After a glass of orange juice he help fine.  Subject reports this is very rare for him.  He slept in until 9am (usually up by 5am) and did not have breakfast or morning medications before the event happened.  Subject reports no other issues.  Subject examined by Julia Marie today.    Adverse Event: Syncope  Serious: no  Severity (mild/moderate/severe): moderate   Start Date: 7/15/2017  End Date: 7/15/2017  Dr. Barber: Please assign causality of above AE  Relationship: Is there a reasonable possibility that the event may have been caused by Investigational Medicinal Product?  Doubt syncope due to investigational product.  However given sudden onset of syncope as well as history of several episodes of syncope needs further evaluation.     No

## 2021-06-11 NOTE — PROGRESS NOTES
Pemafibrate to Reduce cardiovascular OutcoMes by reducing triglycerides IN patiENts with diabeTes (PROMINENT) clinical trial.    Spoke with Joe at length today for study visit 22.  Clarified recent adverse events, meds, and history.  Subject reports he's been using a CPAP machine for about 8 years for his DEWAYNE.  He also states he gets yearly lung CA screening CT's done for a chronic benign lung nodule which is unchanged for past 5 years.    Dr. Barber: Please assign causality of AE below:    Adverse Event: Adverse event   Hypogonadism Adverse event   Irritable bowel syndrome with constipation Adverse event   Paroxysmal atrial fibrillation Adverse event   Intermittent shortness of breath     Description: Subject had hormones tested per low testosterone symptoms, was found to be low and started on 200mg bi weekly with good success. Subject was having period of constipation and then diarrhea, saw a GI physician with the [facility] and now taking fiber or loperamide prn. Subject reported racing heart and shortness of breath when doing activities over the past month.  Had a stress test (normal) and is currently wearing a ACT monitor. *Update: showed PAC's and run of a-fib, placed on eliquis and monitoring. Subject reported racing heart and shortness of breath when doing activities over the past month.  Had a stress test (normal) - still smoking and could be lung related. Recent annual CT was unchanged.   Start Date: 1/24/2020 6/18/2020 8/1/2020 8/1/2020   End Date: ongoing ongoing ongoing ongoing   Date of Awareness: 9/3/2020 9/3/2020 9/3/2020 9/3/2020   Severity (mild/moderate/severe): mild mild moderate moderate   Reportable to IRB:  Yes  []     No  [x]  Yes  []     No  [x]  Yes  []     No  [x]  Yes  []     No  [x]   Serious?  Yes  []     No  [x]  Yes  []     No  [x]  Yes  []     No  [x]  Yes  []     No  [x]     Related to study drug?  Yes  []     No  [x]  Yes  []     No  [x]  Yes  []     No  [x]  Yes  []     No  [x]    Action taken with study treatment: [x] Dose Not Changed  [] Dose Increased  [] Dose Reduced [x] Dose Not Changed  [] Dose Increased  [] Dose Reduced [x] Dose Not Changed  [] Dose Increased  [] Dose Reduced [x] Dose Not Changed  [] Dose Increased  [] Dose Reduced     Outcome:  [x]Not Resolved  []Recovered/Resolved [x]Not Resolved  []Recovered/Resolved [x]Not Resolved  []Recovered/Resolved [x]Not Resolved  []Recovered/Resolved   Medication or therapies taken:  Yes  [x]     No  []  Testosterone 200mg biweekly inj  Yes  [x]     No  []  Loperamide HCL 2mg PRN  Psyllium husk powder 1tbsp qd  Yes  [x]     No  []  Yes  []     No  [x]

## 2021-06-11 NOTE — PROGRESS NOTES
Pemafibrate to Reduce cardiovascular OutcoMes by reducing triglycerides IN patiENts with diabeTes (PROMINENT) clinical trial.     Subject seen in clinic today for study screening visit.      See flow sheets for vital signs.    Labs drawn per protocol.  Results will be scanned into 'media'.     Subject seen by provider Julia Marie for study related physical exam.    Inclusion & Exclusion criteria reviewed again with subject and they remain eligible to participate.     Medical history, concomitant medications, endpoints and adverse events reviewed with subject.  Cardiovascular risk discussed. Study alcohol consumption stipulations(subject reports no current alcohol use) and education reviewed with subject.    Study medication Box # [id number] of 70 tablets dispensed to subject. This is a placebo run in period for 3 to 5 weeks.    Education provided on medication compliance and administration.    Plan: Will schedule study randomization visit with subject in 3-5 weeks back at [facility].    Presley Torres RN  Clinical Trials Nurse

## 2021-06-11 NOTE — TELEPHONE ENCOUNTER
Pt informed caller that [facility] and that he will be able to obtain his medication without any financial concerns. He is taking medication well without any side effects/concerns at this point. -kcl

## 2021-06-11 NOTE — PROGRESS NOTES
Pemafibrate to Reduce cardiovascular OutcoMes by reducing triglycerides IN patiENts with diabeTes (PROMINENT).     Subject seen in clinic today for study prescreening visit.      Consent process:     Research nurse met with subject and his wife to discuss participation in the above noted study.    The study discussion included the following:     Study purpose    Qualifications for participation    Length of study participation    Study procedures    Risks and side effects    Benefits (if any)    Voluntary nature of participation    Alternatives to participation    Confidentiality of records    Financial considerations     Subject asked questions and agreed that he received answers that satisfied him.    Consent form (version [id number]) signed.   A copy was offered to the subject & a copy was forwarded to medical records.    No study procedures were done prior to obtaining informed consent.     Inclusion & Exclusion criteria reviewed again with subject; he remains eligible to participate.   Type 2 Diabetes      Dx ~2002   Triglycerides   269      1/25/17   Statin   Atorvastatin 80mg  QD   CVD Event   (N/A due to age 50+)   LDL-C   64        1/25/17   HDL-C   22        1/25/17     Patient reports history of type 2 diabetes first diagnosed around 2002 by Dr. Deepthi Graves or Dr. Beth, both residents at [facility] at the time.    Plan: Will schedule study fasting screening visit with subject within 3 weeks.    Man Acevedo Sr. agrees with this plan.    Presley Torres, RN  Clinical Trials Nurse

## 2021-06-11 NOTE — TELEPHONE ENCOUNTER
----- Message from Yeny Barber MD sent at 9/11/2020  2:44 PM CDT -----  I called this 60-year-old gentleman with history of coronary artery disease and sleep apnea to go over results of his monitor.  I did tell him that we saw over 12 hours of atrial fibrillation on one occasion with some brief episodes another occasions.  I did ask him to continue to work on weight loss as well as treatment for sleep apnea.  Given that his CHADS 2 VASC score is 2 on the basis of diabetes and hypertension possibly 3 adding coronary artery disease I think he would benefit from Eliquis 5 mg p.o. twice daily.  Can you arrange for his pharmacy to give him a one-week supply, 14 tablets with 1 refill, if he tolerates it and is able to pay for it then let us go for a 90-day prescription.  Thank you  LF  --------------------------------------  Pt agreeable to trial of Eliquis. Small supply sent to preferred pharmacy. Pt will call with update on how this medications is tolerated next week. -kcl

## 2021-06-11 NOTE — TELEPHONE ENCOUNTER
Pemafibrate to Reduce cardiovascular OutcoMes by reducing triglycerides IN patiENts with diabeTes (PROMINENT) clinical trial.    Study telephone visit form:    Subject Number:   [id number]                                     Dr. Barber- PI      CONCOMITANT MEDICATIONS  ? Investigator to report if participant needs treatment with prohibited medications (Fibrates or other agents with PPAR-? agonist activity (e.g., saroglitazar); See protocol for exclusionary medications and actions to be taken.    VISIT SCHEDULING AND CONTACT CONFIRMATION  ? Confirm date of next study visit   ? Confirm information on Subject Information Form or update as needed      Visit Number:__v22_____  Visit Date: 2020      Was the subject, relatives or health care provider (as per consent) contacted by phone?    [x] Yes  [] No    Yes [x]  No []   If Yes, Date of Contact:   Date: ___ ____ ____   [facility] YYYY  If No, please document attempts to contact subject (include date and type of contact)   Date: ____ ____ ____   Type: [] Tel _ [] Other___  [] 2 Date:      Name of Person: Contacted:   _____Self/Subject_______________   Relationship to Subject:   ______________________  Type:[]  Tel[x]  Other _______   Date: ____ ___ ____   Type: [] Tel [] Other _______    Subject Status on the day assessed     [x] Subject alive  []  Subject    [] Unknown at present Please complete  Death Details Form in InForm and submit source documents to [facility]        Review Subject Contact Information Form and update as needed   [x]  Review concomitant medication use   [x]  Query participant and record any reported AEs   [x] Query participant and record any CV efficacy events   [x] Confirm next study visit: ___tba - discussed possible lab makeup visit too__________   [x] Instruct participants to bring all study supplies with them to the next in-person visit    Instruct participants to bring all study supplies with them to the next in-person visit    Instruct participants to fast for ? 8 hours immediately prior to next in-person visit (i.e nothing by mouth, except water and essential medications).     Presley Torres RN

## 2021-06-11 NOTE — TELEPHONE ENCOUNTER
----- Message from Sparkle Neves sent at 9/28/2020 12:49 PM CDT -----  General phone call:    Caller: Pt  Primary cardiologist: Sunil  Detailed reason for call: Pt stopped into clinic to state he is having an Upper Endoscopy Dilatation on 10/1- had questions about holding Eloquis during procedure  New or active symptoms?   Best phone number: [phone]  Best time to contact:   Ok to leave a detailed message?   Device? No    Additional Info:     Pt was advised that a call back will be made only If there are concerns with holding the single dose of Eliquis. Pt verbalized understanding.     Dr. Yost, in Dr. Barber's absence can you please advise? Pt is having an upper endoscopy on 10/1 at [facility] and they would like him to hold just his AM dose of Eliquis on the day of the procedure and resume the evening after. Ok to hold? Pt is on eliquis for a fib following recent results of JULIO CESAR monitor. Thanks!

## 2021-06-12 NOTE — PROGRESS NOTES
Pt comes to clinic today for ekg/QT check after starting Sotalol 80 mg two times a day on Saturday 11-7-20.      EKG shows SR w/PAC @ 75, , [id number].  EKG similar to ekg obtained on 11-5-20.    Pt states that he stopped taking Metoprolol on Thursday.  He states that he feels fine with Sotalol, no shortness of breath or dizziness.  He notes that he had palpitations for about 2 hours on Saturday morning, about 10:00, and no palpitations since that time.  Answered all of patients questions and he states understanding, reviewed contact information.  Pt is scheduled for follow up with Julia on 12-3-20.

## 2021-06-12 NOTE — TELEPHONE ENCOUNTER
Return call to patient. Advised that persistent headache is not commonly seen as adverse effect from Eliquis. Recommended patient contact his PCP for evaluation. Patient agreed. -amanda     ----- Message from Roxanna Chun sent at 10/19/2020  5:00 PM CDT -----      Caller: Patient  Primary cardiologist: Dr. Barber    Detailed reason for call: Patient stated that he has had a headache for 3 weeks since being on Eliquis and nothing seems to get rid of the pain. Please advise    Best phone number: [phone]  Best time to contact: today  Ok to leave a detailed message? yes  Device? no

## 2021-06-12 NOTE — PROGRESS NOTES
Subject in clinic for study annual lab V21 make up per covid19 closure this summer.  Labs and urine completed per protocol.     Presley Torres RN

## 2021-06-12 NOTE — PATIENT INSTRUCTIONS - HE
Man Acevedo Sr.,    It was a pleasure to see you today at the [facility].     My recommendations after this visit include:  - I will update Dr. Barber about your afib and see if he wants to see you sooner or have you follow up with afib nurse practitioner.    - Start wearing your CPAP every night.     Yvonne Gilliland, CNP

## 2021-06-12 NOTE — PROGRESS NOTES
Pemafibrate to Reduce cardiovascular OutcoMes by reducing triglycerides IN patiENts with diabeTes (PROMINENT) clinical trial.    Subject seen in clinic today for study Visit 23.      Subject seen by provider Yvonne Florian for study related physical exam.  See provider note and flow sheets for vital signs.  Vitals:    10/29/20 1021   BP: 110/54   Patient Site: Right Arm   Patient Position: Sitting   Cuff Size: Adult Regular   Pulse: (!) 48   Resp: 18   SpO2: 96%   Weight: (!) 243 lb (110.2 kg)   Waist measures 115 cm    Study efficacy, endpoints and adverse events reviewed with subject.  Recent AE's ongoing still. Cardiovascular risk discussed.      Study alcohol consumption stipulations and education reviewed with subject:    Subject reports  [] Never [x] Current [] Former.   1 drinks per [] Day [] Week [] Month [x] Occasional.  1 maximum drinks per sitting.     Study medication box # [id number] with 40 tabs  Study medication box # [id number] with 0 tabs  Study medication box # [id number] with 0 tabs  Study medication box # [id number] with 0 tabs  returned by subject.  Total tablet count of 40 for 100 % compliance.  Study medication box #'s [id number], [id number], [id number], [id number] dispensed to subject.  Education provided on medication compliance and administration    Plan: Study Visit 24 telephone call with subject in 2 months.  Will schedule study Visit 25  with subject in 12 weeks.    Presley Torres, RN  Clinical Trials Nurse    Dr. Barber: Please assign causality of AE below:    Adverse Event: Adverse event   Intermittent Headaches Adverse event   Achalasia   Description: Reported headaches on and off for about a month.  Subject stated his PCP thought it might be due to muscle tension. States is getting better/less frequent. Subject had esophageal thickening last year and trouble swallowing food.  Received botox injection which helped.  This summer it acted up again and had GI scope with esophageal dilation on 10/1/20.  Subject reports that didn't help and is following up with GI again.   Start Date: 9/29/2020 7/1/2020   End Date: ongoing ongoing   Date of Awareness: 10/29/2020 10/29/2020   Severity (mild/moderate/severe): mild moderate   Reportable to IRB:  Yes  []     No  [x]  Yes  []     No  [x]   Serious?  Yes  []     No  [x]  Yes  []     No  [x]     Related to study drug?    Yes  []     No  [x]    Yes  []     No  [x]   Action taken with study treatment: [x] Dose Not Changed   [x] Dose Not Changed           Outcome:  []Not Recovered/Not Resolved  []Recovered/Resolved  []Recovered/Resolved with Sequelae   [x]Recovering/Resolving [x]Not Recovered/Not Resolved  []Recovered/Resolved  []Recovered/Resolved with Sequelae   []Recovering/Resolving   Medication or therapies taken:  Yes  []     No  [x]  Yes  []     No  [x]

## 2021-06-12 NOTE — PATIENT INSTRUCTIONS - HE
Man Acevedo Sr.,    It was a pleasure to see you today at the [facility].     My recommendations after this visit include:    Continue Eliquis 5 mg every 12 hours to decrease stroke risk with A fib    On Saturday, start taking sotalol 80 mg two times a day.  Stop taking metoprolol and decrease lisinopril to 20 mg (1/2 tab) daily.    EKG on Monday    Keep a log of A fib episodes (frequency and duration)    Echo to evaluate heart structure and strength    Follow up in 1 month    Julia Marie, CNP  [facility], Electrophysiology  [phone]  EP nurses [phone]

## 2021-06-12 NOTE — TELEPHONE ENCOUNTER
Spoke with Joe about study plan for tomorrow, covid19 precautions and screening.    Presley Torres RN

## 2021-06-12 NOTE — PATIENT INSTRUCTIONS - HE
It was great to see you again today for the Prominent triglyceride study.  We will call you in 2 months for a study telephone visit.    We will also see you back in 4 months for your Visit 25.  Please remember to bring back your study drug and packages (both empty or full) to every study visit.  Inform us of any changes in your health and call with any questions.  Thanks!    Research Hotline: [phone]  Presley Torres RN  Clinical Trials Nurse

## 2021-06-13 NOTE — PROGRESS NOTES
[facility] Follow-up Note    Assessment & Plan        1. Coronary artery disease due to lipid rich plaque  -angiography January 24, 2017 showed 30% left main lesion, mid left anterior descending 30% lesion followed by a 95% lesion the received a drug-coated stent, distal left anterior descending 20% lesion and second diagonal 70% lesion.  The ramus had a 50% lesion, circumflex had a proximal 40% lesion, and the right coronary artery had a proximal 20 and mid 20% lesions.  His stress test done in May looked normal so work on prevention.   2. NSTEMI (non-ST elevated myocardial infarction) -acute coronary syndrome of the anterior wall secondary to above.    3. Benign essential hypertension -under not optimal control and I rechecked his blood pressure is 155-160 systolic.  He is taking upper respiratory tract infection medications and would like him to recheck this and call me results.  If it remains elevated we will then go ahead and increase metoprolol.   4. Uncontrolled type 2 diabetes mellitus with other diabetic kidney complication, unspecified long term insulin use status -defer to primary.   5. Obstructive sleep apnea -CPAP.   6. Dyslipidemia -on atorvastatin 80 mg and now on the Prominent study.   7.  Obesity-weight loss.    Plan  1.  Weight loss.  2.  Discontinue Plavix in January.  3.  Follow-up with me after January or February 2018.    Subjective  CC: 57-year-old white gentleman here for a follow-up visit.  He comes in with some shortness of breath and heavy activity and still uses CPAP at nighttime.  Otherwise there is no fatigue, syncope, dizziness, chest discomfort, palpitations, PND, orthopnea or peripheral edema.    Medications  Current Outpatient Prescriptions   Medication Sig Note     aspirin 81 MG EC tablet Take 81 mg by mouth daily.      atorvastatin (LIPITOR) 80 MG tablet Take 1 tablet (80 mg total) by mouth daily.      cholecalciferol, vitamin D3, 1,000 unit tablet Take 1,000  "Units by mouth daily.      clopidogrel (PLAVIX) 75 mg tablet Take 1 tablet (75 mg total) by mouth daily.      cyanocobalamin 1000 MCG tablet Take 1,000 mcg by mouth daily.      insulin aspart (NOVOLOG) 100 unit/mL injection Inject 35 Units under the skin 3 (three) times a day before meals.  2/6/2017: Reports he takes 35 U ; 3 x day, with meals     insulin glargine (LANTUS) 100 unit/mL injection Inject 60 Units under the skin 2 (two) times a day.  2/6/2017: Reports he takes 60 U -2 x day     liraglutide (VICTOZA) 0.6 mg/0.1 mL (18 mg/3 mL) PnIj injection Inject 1.8 mg under the skin daily.       lisinopril (PRINIVIL,ZESTRIL) 40 MG tablet Take 40 mg by mouth daily.      metFORMIN (GLUCOPHAGE) 1000 MG tablet Take 1 tablet (1,000 mg total) by mouth 2 (two) times a day with meals.      metoprolol succinate (TOPROL XL) 25 MG Take 1 tablet (25 mg total) by mouth daily.      minocycline (MINOCIN,DYNACIN) 100 MG capsule Take 100 mg by mouth 2 (two) times a day as needed.       potassium citrate (UROCIT-K) 5 mEq (540 mg) SR tablet Take 15 mEq by mouth every morning.      potassium citrate (UROCIT-K) 5 mEq (540 mg) SR tablet Take 30 mEq by mouth bedtime.      syringe accessory Misc Inject 3 each as directed.      tamsulosin (FLOMAX) 0.4 mg Cp24 Take 1 capsule (0.4 mg total) by mouth daily.        Objective  /82 (Patient Site: Left Arm, Patient Position: Sitting, Cuff Size: Adult Large)  Pulse 80  Resp 16  Ht 5' 11\" (1.803 m)  Wt (!) 253 lb (114.8 kg)  BMI 35.29 kg/m2    General Appearance:    Alert, cooperative, no distress, appears stated age, moderately obese   Head:    Normocephalic, without obvious abnormality, atraumatic   Throat:   Lips, mucosa, and tongue normal; teeth and gums normal   Neck:   Supple, symmetrical, trachea midline, no adenopathy;        thyroid:  No enlargement/tenderness/nodules; no carotid    bruit or JVD   Back:     Symmetric, no curvature, ROM normal, no CVA tenderness   Lungs:     Clear " to auscultation bilaterally, respirations unlabored   Chest wall:    No tenderness or deformity   Heart:    Regular rate and rhythm, S1 and S2 normal, no murmur, rub   or gallop   Abdomen:     Soft, non-tender, bowel sounds active all four quadrants,     no masses, no organomegaly   Extremities:   Normal, atraumatic, no cyanosis or edema   Pulses:   2+ and symmetric all extremities   Skin:   Skin color, texture, turgor normal, no rashes or lesions     Results    Lab Results personally reviewed   Lab Results   Component Value Date    CHOL 140 01/25/2017    CHOL 149 01/24/2017     Lab Results   Component Value Date    HDL 22 (L) 01/25/2017    HDL 26 (L) 01/24/2017     Lab Results   Component Value Date    LDLCALC 64 01/25/2017    LDLCALC 57 01/24/2017     Lab Results   Component Value Date    TRIG 269 (H) 01/25/2017    TRIG 330 (H) 01/24/2017     Lab Results   Component Value Date    WBC 8.1 01/24/2017    HGB 15.4 01/25/2017    HCT 42.1 01/24/2017     01/25/2017     Lab Results   Component Value Date    CREATININE 1.03 01/25/2017    BUN 16 01/25/2017     01/25/2017    K 4.0 01/25/2017    CO2 21 (L) 01/25/2017     Review of Systems:   General: WNL  Eyes: WNL  Ears/Nose/Throat: WNL  Lungs: WNL  Heart: WNL  Stomach: WNL  Bladder: WNL  Muscle/Joints: WNL  Skin: WNL  Nervous System: WNL  Mental Health: WNL     Blood: WNL

## 2021-06-13 NOTE — TELEPHONE ENCOUNTER
PT DAILY TREATMENT NOTE 10-18    Patient Name: Iona Loya  Date:2019  : 1950  [x]  Patient  Verified  Payor: [facility] MEDICAID / [facility] / Product Type: Managed Care Medicaid /    In time:900  Out time:926  Total Treatment Time (min): 26  Visit #: 2 of 16    Medicare/BCBS Only   Total Timed Codes (min):  26 1:1 Treatment Time:  26       Treatment Area: Radiculopathy, cervical region [M54.12]  Pain in right shoulder [M25.511]    SUBJECTIVE  Pain Level (0-10 scale): 10  Any medication changes, allergies to medications, adverse drug reactions, diagnosis change, or new procedure performed?: [x] No    [] Yes (see summary sheet for update)  Subjective functional status/changes:   [] No changes reported  Pt reports high pain levels. Notes she has not tried her HEP yet. OBJECTIVE    16 min Therapeutic Exercise:  [x] See flow sheet :   Rationale: increase ROM and increase strength to improve the patients ability to increase ease with overhead reaching       10 min Manual Therapy:  In S/L scapular mobs, right shoulder PROM,    Rationale: decrease pain, increase ROM and increase tissue extensibility to increase ease with ADls          With   [] TE   [] TA   [] neuro   [] other: Patient Education: [x] Review HEP    [] Progressed/Changed HEP based on:   [] positioning   [] body mechanics   [] transfers   [] heat/ice application    [] other:           Pain Level (0-10 scale) post treatment: 8    ASSESSMENT/Changes in Function: Initiated exercises per POC.  Pt with poor tolerance to manuals evident by increased muscle guarding     Patient will continue to benefit from skilled PT services to modify and progress therapeutic interventions, address functional mobility deficits, address ROM deficits, address strength deficits, analyze and address soft tissue restrictions, analyze and cue movement patterns, analyze and modify body mechanics/ergonomics, assess and modify postural abnormalities Wellness Screening Tool  Symptom Screening:  Do you have one of the following NEW symptoms:    Fever (subjective or >100.0)?  No    A new cough?  No    Shortness of breath?  No     Chills? No     New loss of taste or smell? No     Generalized body aches? No     New persistent headache? No     New sore throat? No     Nausea, vomiting, or diarrhea?  No    Within the past 2 weeks, have you been exposed to someone with a known positive illness below:    COVID-19 (known or suspected)?  No    Chicken pox?  No    Mealses?  No    Pertussis?  No    Patient notified of visitor policy- No visitors are allowed to accompany the patient at this time. Yes  Pt informed to wear a mask: Yes  Pt notified if they develop any symptoms listed above, prior to their appointment, they are to call the clinic directly at [phone] for further instructions.  Yes  Patient's appointment status: Patient will be seen in clinic as scheduled on 12/3        and instruct in home and community integration to attain remaining goals. [x]  See Plan of Care  []  See progress note/recertification  []  See Discharge Summary         Progress towards goals / Updated goals:  Short Term Goals: To be accomplished in 3-4 weeks:  1. Patient will subjectively report full compliance with prescribed HEP. Eval: HEP provided  2. Patient will demonstrate right shoulder flexion AROM >/= 100 degrees to improve ease with overhead lifting. Eval: Right Shoulder Flexion AROM = 75 deg (pain)  3. Patient will demonstrate right shoulder flexion MMT >/= 4/5 to improve ease with household ADLs. Eval: Right Shoulder Flexion MMT = 2+/5     Long Term Goals: To be accomplished in 7-8 weeks:  1. Patient will demonstrate a significant functional improvement as demonstrated by a score of >/= 54 on FOTO. Eval: FOTO = 30  2. Patient will demonstrate functional right shoulder ER to occiput to improve ease with self care ADLs. Eval: Right Shoulder Functional ER = NT  3. Patient will subjectively report >/= 60% improvement to improve overall quality of life.   Eval: 0%      PLAN  [x]  Upgrade activities as tolerated     [x]  Continue plan of care  []  Update interventions per flow sheet       []  Discharge due to:_  []  Other:_      Aneudy Silva 6/6/2019  9:03 AM    Future Appointments   Date Time Provider Jovany Monroe   6/11/2019 10:00 AM Yoselin Baxter PTA [facility]   6/13/2019 11:00 AM Alta Foss, PT [facility]   6/18/2019 11:30 AM Yoselin Baxter PTA [facility]   6/20/2019 11:30 AM Ben Zamora PTA [facility]   6/25/2019 11:00 AM Alta Foss, PT [facility]   6/27/2019 11:30 AM Ben Zamora PTA [facility]   7/1/2019 11:30 AM Ben Zamora PTA [facility]   7/3/2019 11:30 AM Ben Zamora, PTA [facility]   7/8/2019 11:30 AM Alta Foss, PT [facility]   7/11/2019 11:30 AM Ben Zamora, PTA [facility]   8/9/2019 11:20 AM Primo Good PA-C [facility]   12/6/2019 11:00 AM [facility] RM 2 [facility]   12/13/2019  1:30 PM Devin Martin NP [facility]   1/8/2020 10:00 AM Sebastian Uribe MD [id number] [facility]

## 2021-06-13 NOTE — PROGRESS NOTES
Pemafibrate to Reduce cardiovascular OutcoMes by reducing triglycerides IN patiENts with diabeTes (PROMINENT) clinical trial.    Subject seen in clinic today for study visit 4 (month 2). Updated consent form reviewed and signed prior to visit activities.     Re-Consent process:   Research nurse met with subject to review updated consent form for the above noted study.    The study discussion included the following:     Study purpose    Qualifications for participation    Length of study participation    Study procedures    Risks and side effects    Benefits (if any)    Voluntary nature of participation    Alternatives to participation    Confidentiality of records    Financial considerations     Subject asked questions and agreed that he received answers that satisfied him.    Consent form (version 2.0  [id number]) signed.   A copy was offered to the subject & a copy was forwarded to medical records.    Fasting safety labs drawn per protocol.  Results will be scanned into 'media'.     Concomitant medications, endpoints and adverse events reviewed with subject.  Subject reports no additional adverse events or endpoints other than reported below .    Plan: Will schedule study Month 4 Visit 5 with subject in 8 weeks back at [facility].    Presley Torres RN  Clinical Trials Nurse      ADVERSE EVENT: Subject reports headaches starting about 6 weeks ago (about 2.5 weeks after first dose of possible study drug, headaches are not currently listed as a known side effect).  He says he has 2 - 3 headaches per week that range 7 to 10 on the 1-10 pain scale.  Subject reports he hasn't had bad headaches for several years until this recent episode.  He reports some mild photosensitivity during the headaches and believes some may be migraines and some tension headaches.  Massage helps relieve these.  He has taken OTC Extra strength Tylenol and Advil which also help.  He has not had to see a provider or sought other  treatment for these.        Adverse Event: Headache  Serious: no  Severity (mild/moderate/severe): moderate   Start Date: 07Aug2017   End Date: ongoing  Dr. Barber: Please assign causality of above AE  Relationship: Is there a reasonable possibility that the event may have been caused by Investigational Medicinal Product? YES

## 2021-06-13 NOTE — PATIENT INSTRUCTIONS - HE
Man Acevedo Sr.,    It was a pleasure to see you today at the [facility].     My recommendations after this visit include:    Increase sotalol to 120 mg two times a day    EKG on Monday    Follow up in 1 month    Julia aMrie CNP  [facility], Electrophysiology  [phone]  EP nurses [phone]

## 2021-06-14 NOTE — TELEPHONE ENCOUNTER
Pemafibrate to Reduce cardiovascular OutcoMes by reducing triglycerides IN patiENts with diabeTes (PROMINENT) clinical trial.    Study telephone visit form:    Subject Number:   [id number]                                     Dr. Barber- PI      CONCOMITANT MEDICATIONS  ? Investigator to report if participant needs treatment with prohibited medications (Fibrates or other agents with PPAR-? agonist activity (e.g., saroglitazar); See protocol for exclusionary medications and actions to be taken.    VISIT SCHEDULING AND CONTACT CONFIRMATION  ? Confirm date of next study visit   ? Confirm information on Subject Information Form or update as needed      Visit Number:__V24_____  Visit Date:  2021      Was the subject, relatives or health care provider (as per consent) contacted by phone?    [x] Yes  [] No    Yes [x]  No []   If Yes, Date of Contact:   Date: ____2021 ____ ____   DD [facility] YYYY  If No, please document attempts to contact subject (include date and type of contact)   Date: ____ ____ ____   Type: [] Tel _ [] Other___  [] 2 Date:      Name of Person: Contacted:   _____Self/Subject_______________   Relationship to Subject:   ______________________  Type:[]  Tel[x]  Other _______   Date: __2021__ ____ ____   Type: [] Tel [] Other _______    Subject Status on the day assessed     [x] Subject alive  []  Subject    [] Unknown at present Please complete  Death Details Form in InForm and submit source documents to [facility]        Review Subject Contact Information Form and update as needed   [x]  Review concomitant medication use   [x]  Query participant and record any reported AEs [AE's of shortness of breath, palpitations, and headaches reported resolved as of 2020 sat after increasing his sotolol.  AE of achalasia continues.]  [x] Query participant and record any CV efficacy events   [x] Confirm next study visit: ___tba__________   [x] Instruct participants to bring all study supplies with them to the  next in-person visit    Instruct participants to bring all study supplies with them to the next in-person visit   Instruct participants to fast for ? 8 hours immediately prior to next in-person visit (i.e nothing by mouth, except water and essential medications).     Presley Torres RN

## 2021-06-14 NOTE — PROGRESS NOTES
Pemafibrate to Reduce cardiovascular OutcoMes by reducing triglycerides IN patiENts with diabeTes (PROMINENT) clinical trial.    Subject seen in clinic today for study Visit 5 month 4.      Subject seen by provider Julia Marie for study related physical exam.  See provider notes and flow sheets for vital signs. There were no vitals filed for this visit.   Waist measures 124cm today.    Labs drawn per protocol.  Urine sample collected.  Results will be scanned into 'media'.     Study efficacy, endpoints and adverse events reviewed with subject.  Cardiovascular risk discussed. Study alcohol consumption stipulations and education reviewed with subject.  Adverse events recorded and updated that head aches AE has ended.    Study medication box #'s and tablet counts returned by subject. [id number] #0, 2807590 #2. [id number] #7, 5338899 #70   Tablet count of 79 for 85% compliance.    Study medication box #'s [id number], [id number], [id number], [id number] dispensed to subject.  Education provided on medication compliance and administration.    Plan: Will schedule study Month 6 Visit  with subject in 8 weeks back at [facility].    Presley Torres RN  Clinical Trials Nurse      ADVERSE EVENT #1: Subject was in attendance of his wife's death at [facility].  He estimates approximately 30 family members were in the room at the time.  He was feeling hot and went to sit down when he had brief near syncopal episode.  No seizure-like activity, no chest pain or dyspnea.  Denies any recent illnesses including fevers or symptoms of GI bleeding.EKG nonischemic, troponin I not elevated, basic metabolic profile and CBC within normal limits.  History consistent with vasovagal or neurocardiogenic near syncope secondary to emotional stress of wife's death.  Subject was not admitted and no further treatments required.    Adverse Event: Vasovagal near-syncope   Serious: No  Severity (mild/moderate/severe): mild  Start Date: 15Oct2017  End Date:  15Oct2017  Dr. Barber: Please assign causality of above AE#1  Relationship: Is there a reasonable possibility that the event may have been caused by Investigational Medicinal Product? NO        ADVERSE EVENT #2: Subject experienced 2 separate episodes of sudden onset palpitations, shortness of breath, diffuse paresthesias, and an overall sensation of not feeling right.  During 1 of these episodes the patient checked his blood pressure and noted that it was 196/124.  Upon arrival to the ED the patient was asymptomatic.  Initial EKG reveals normal sinus rhythm without any concerning ST or T-wave changes or other arrhythmias. /76.  Patient was kept on the monitor and there were no arrhythmias noted at any time during his ED stay.  CBC, BMP, and magnesium were unremarkable.  The patient appears to be experiencing episodes of anxiety and/or mild panic attacks regarding the recent death of his wife approximately 1 week ago.  Hydroxyzine 25mg prescribed.    Adverse Event: Acute anxiety  Serious: No  Severity (mild/moderate/severe): moderate   Start Date: 26Oct2017  End Date: 26Oct2017  Dr. Barber: Please assign causality of above AE #2  Relationship: Is there a reasonable possibility that the event may have been caused by Investigational Medicinal Product? NO

## 2021-06-15 PROBLEM — I21.4 NSTEMI (NON-ST ELEVATED MYOCARDIAL INFARCTION) (H): Status: ACTIVE | Noted: 2017-01-24

## 2021-06-15 NOTE — PROGRESS NOTES
Assessment/Plan:     No changes to his medications today. He will continue to follow-up with research per protocol for PROMINENT study.    Subjective:     Man JOSEPH Acevedo Sr. is seen at [facility] today for PROMINENT study, visit 6. Pemafibrate to Reduce cardiovascular OutcoMes by reducing triglycerides IN patiENts with diabeTes (PROMINENT) clinical trial.  He has a history of coronary artery disease with previous MI and stents, hypertension, type 2 diabetes, and hyperlipidemia.   He states that he is feeling well and has no concerns today.  He denies fatigue, lightheadedness, shortness of breath, dyspnea on exertion, orthopnea, PND, palpitations, chest pain, abdominal fullness/bloating and lower extremity edema.  He is scheduled for kidney surgery in the near future for recurrent kidney stones.    Patient Active Problem List   Diagnosis     Peripheral Neuropathy     Nonalcoholic Fatty Liver Disease     Lower Back Pain     Adjustment Disorder With Anxiety And Depressed Mood     Neck Pain     Dyslipidemia     Uncontrolled type 2 diabetes mellitus with other diabetic kidney complication, unspecified long term insulin use status     Pain in joint, ankle and foot     Obstructive sleep apnea     Right carpal tunnel syndrome     Left hand weakness     Tennis elbow     Benign essential hypertension     Obesity due to excess calories     Coronary artery disease due to lipid rich plaque     NSTEMI (non-ST elevated myocardial infarction)     Tobacco use       Past Medical History:   Diagnosis Date     Coronary artery disease      Diabetes mellitus 01/2005    adult onset     GERD (gastroesophageal reflux disease)      High cholesterol      Hyperlipidemia      Hypertension      Kidney stones      Liver disease      Myocardial infarction      Neuropathy of foot      Obesity      DEWAYNE on CPAP      Peripheral Neuropathy     Created by Conversion      Sleep apnea        Past Surgical History:   Procedure Laterality Date  "    CARDIAC CATHETERIZATION       CARPAL TUNNEL RELEASE Right 04/12/2016     CORONARY STENT PLACEMENT       KIDNEY STONE SURGERY       KNEE ARTHROSCOPY Left 8/6/15    Partial medial meniscectoy[facility]     LEG SURGERY       NH AMPUTATE METACARPAL+FINGER      Description: Metacarpal Amputation And Index Finger;  Recorded: 08/15/2014;  Comments: For \"cartilage cancer\"     NH CATH PLACEMENT & NJX CORONARY ART ANGIO IMG S&I N/A 1/24/2017    Procedure: Coronary Angiogram;  Surgeon: Melissa Freeman MD;  Location: [facility] Cath Lab;  Service: Cardiology     NH L HRT CATH W/NJX L VENTRICULOGRAPHY IMG S&I N/A 1/24/2017    Procedure: Left Heart Catheterization with Left Ventriculogram;  Surgeon: Melissa Freeman MD;  Location: [facility] Cath Lab;  Service: Cardiology     NH LAP,CHOLECYSTECTOMY      Description: Cholecystectomy Laparoscopic;  Proc Date: 12/11/2013;     NH PERCUT REMV KID STONE,UP TO 2 CM      Description: Percutaneous Lithotomy;  Proc Date: 11/05/2012;       History reviewed. No pertinent family history.    Social History     Social History     Marital status:      Spouse name: N/A     Number of children: N/A     Years of education: 13     Occupational History     Not on file.     Social History Main Topics     Smoking status: Former Smoker     Packs/day: 0.50     Smokeless tobacco: Never Used      Comment: Patient reports he is trying to quit.     Alcohol use No     Drug use: No     Sexual activity: Not on file     Other Topics Concern     Not on file     Social History Narrative    Wife of 21 years passed away on 10/15/2017. Had 8 kids with her - 3 of hers from a previous marriage, 3 of his from a previous marriage, and 2 kids they adopted together. He has 20 grandchildren.        Current Outpatient Prescriptions   Medication Sig Dispense Refill     aspirin 81 MG EC tablet Take 81 mg by mouth daily.       atorvastatin (LIPITOR) 80 MG tablet Take 1 tablet (80 " mg total) by mouth daily. 30 tablet 1     chlorthalidone (HYGROTEN) 25 MG tablet Take 12.5 mg by mouth daily.       cholecalciferol, vitamin D3, 1,000 unit tablet Take 1,000 Units by mouth daily.       clopidogrel (PLAVIX) 75 mg tablet Take 1 tablet (75 mg total) by mouth daily.  0     cyanocobalamin 1000 MCG tablet Take 1,000 mcg by mouth daily.       hydrOXYzine (ATARAX) 25 MG tablet Take 1 tablet (25 mg total) by mouth every 6 (six) hours as needed for anxiety. 15 tablet 0     insulin aspart (NOVOLOG) 100 unit/mL injection Inject 35 Units under the skin 3 (three) times a day before meals.        insulin glargine (LANTUS) 100 unit/mL injection Inject 56 Units under the skin 2 (two) times a day.        liraglutide (VICTOZA) 0.6 mg/0.1 mL (18 mg/3 mL) PnIj injection Inject 1.8 mg under the skin daily.        lisinopril (PRINIVIL,ZESTRIL) 40 MG tablet Take 40 mg by mouth daily.       metFORMIN (GLUCOPHAGE) 1000 MG tablet Take 1 tablet (1,000 mg total) by mouth 2 (two) times a day with meals. 60 tablet 6     metoprolol succinate (TOPROL XL) 25 MG Take 1 tablet (25 mg total) by mouth daily. 30 tablet 0     minocycline (MINOCIN,DYNACIN) 100 MG capsule Take 100 mg by mouth 2 (two) times a day as needed.        potassium citrate (UROCIT-K) 5 mEq (540 mg) SR tablet Take 30 mEq by mouth 2 (two) times a day.        syringe accessory Misc Inject 3 each as directed.       tamsulosin (FLOMAX) 0.4 mg Cp24 Take 1 capsule (0.4 mg total) by mouth daily. 14 capsule 0     Current Facility-Administered Medications   Medication Dose Route Frequency Provider Last Rate Last Dose     Study Drug pemafibrate 0.2 mg/placebo tablet 0.2 mg (PROMINENT)  0.2 mg Oral BID Yeny Barber MD         Study Drug pemafibrate 0.2 mg/placebo tablet 0.2 mg (PROMINENT)  0.2 mg Oral BID Yeny Barber MD         Study Drug pemafibrate 0.2 mg/placebo tablet 0.2 mg (PROMINENT)  0.2 mg Oral BID Presley Torres RN           Allergies   Allergen Reactions      Gabapentin      Tolmetin Unknown     Pt is on Plavix       Objective:     Vitals:    01/12/18 0922   BP: 122/70   Pulse: 76   Resp: 18     Wt Readings from Last 3 Encounters:   01/12/18 (!) 253 lb (114.8 kg)   11/17/17 (!) 245 lb (111.1 kg)   10/26/17 (!) 245 lb (111.1 kg)       General Appearance:   Alert, cooperative and in no acute distress.   HEENT:  No scleral icterus, EOM intact, PERRL; the mucous membranes were pink and moist. Cervical lymph nodes nontender and nonpalpable.   Neck: Supple.  JVP normal.  No HJR.  No obvious thyromegaly.     Chest: The spine was straight. The chest was symmetric.   Lungs:   Respirations unlabored; the lungs are clear to auscultation.   Cardiovascular:   Regular rhythm. S1 and S2 without murmur, clicks or rubs. Radial, carotid and posterior tibial pulses are intact and symmetrical.  No carotid bruits noted.   Abdomen:  Soft, nontender, nondistended, bowel sounds present   Extremities: No cyanosis, clubbing or edema.   Musculoskeletal:  Moves all extremities.   Skin: No bruising or wounds.   Neurologic: Mood and affect are appropriate.             Julia Marie, JENNIFER    This note has been dictated using voice recognition software. Any grammatical, typographical, or context distortions are unintentional and inherent to the software.

## 2021-06-15 NOTE — PATIENT INSTRUCTIONS - HE
It was great to see you again today for the Prominent triglyceride study.  We will call you in 2 months for a study telephone visit.    We will also see you back in 4 months for your Visit 27.  Please remember to bring back your study drug and packages (both empty or full) to every study visit.  Inform us of any changes in your health and call with any questions.  Thanks!    Research Hotline: [phone]  Presley Torrse RN  Clinical Trials Nurse

## 2021-06-15 NOTE — PROGRESS NOTES
"Pemafibrate to Reduce cardiovascular OutcoMes by reducing triglycerides IN patiENts with diabeTes (PROMINENT) clinical trial.    Subject seen in clinic today for study Visit 25.      Subject seen by provider Dr. Barber for study related physical exam.  See provider note and flow sheets for vital signs.    Vitals:    02/23/21 0738   BP: 133/77   Patient Site: Right Arm   Patient Position: Sitting   Cuff Size: Adult Large   Pulse: 78   Weight: (!) 242 lb (109.8 kg)   Height: 5' 11\" (1.803 m)     Waist measures 118 cm    Study efficacy, endpoints and adverse events reviewed with subject.  Cardiovascular risk discussed.     Study alcohol consumption stipulations and education reviewed with subject:    Subject reports  [] Never [x] Current [] Former.   1 drinks per [] Day [] Week [] Month [x] Occasional.  1 maximum drinks per sitting.     Quit smoking for several months and restarted around the holidays.    Study medication box # [id number] with 0 tabs  Study medication box # [id number] with 0 tabs  Study medication box # [id number] with 0 tabs  Study medication box # [id number] with 52 tabs  returned by subject.  Total tablet count of 52 for 100 % compliance.  Study medication box #'s [id number], 1321121, [id number], [id number] dispensed to subject.  Education provided on medication compliance and administration    Plan: Study Visit 26 telephone call with subject in 2 months.  Will schedule study Visit  27 with subject in 4 months back in clinic.    Presley Torres RN  Clinical Trials Nurse          "

## 2021-06-15 NOTE — PROGRESS NOTES
Pemafibrate to Reduce cardiovascular OutcoMes by reducing triglycerides IN patiENts with diabeTes (PROMINENT) clinical trial.    Subject seen in clinic today for study Visit 6 (month 6) .      Re-consent process:      Research nurse met with subject to discuss updates to the consent for the above noted study.     The study discussion included the following:     Study purpose    Qualifications for participation    Length of study participation    Study procedures    Risks and side effects    Benefits (if any)    Voluntary nature of participation    Alternatives to participation    Confidentiality of records    Financial considerations      Subject asked questions and agreed that he received answers that satisfied im.     Consent form (version 3.0  [id number]) signed.   A copy was offered to the subject & a copy was forwarded to medical records.    Subject seen by provider Julia Marie for study related physical exam.  See provider note and flow sheets for vital signs.  Waist measures 127 cm today.    Labs drawn per protocol.  Results will be scanned into 'media'.     Study efficacy, endpoints and adverse events reviewed with subject.  Cardiovascular risk discussed. Study alcohol consumption stipulations and education reviewed with subject.    Study medication box #[id number], [id number], 0786930, [id number]  brought in by subject for compliance check.  Tablet count of 175  for 97 % compliance. Education provided on medication compliance and administration.    Subject reports history of kidney stones (left sided) for several years and that he is scheduled for a kidney stone surgery on 05Feb2018 from ongoing monitoring of this.  Subject reports no issues or pain from this today and that he is doing well.  He reports the surgeon asked him to stop his clopideogrel prior to the surgery and may have him stop other medications. I reinforced the importance of following the surgeon's instructions and also calling us with any  updates.    Plan: Will schedule study Visit 7 (month 8) with subject in 8 weeks back at [facility].    Presley Torres RN  Clinical Trials Nurse

## 2021-06-15 NOTE — TELEPHONE ENCOUNTER
Spoke with Joe about study visit tomorrow, covid19 prescreen precautions and study plan.    Presley Torres RN

## 2021-06-16 ENCOUNTER — AMBULATORY - HEALTHEAST (OUTPATIENT)
Dept: CARDIOLOGY | Facility: CLINIC | Age: 61
End: 2021-06-16

## 2021-06-16 ENCOUNTER — COMMUNICATION - HEALTHEAST (OUTPATIENT)
Dept: CARDIOLOGY | Facility: CLINIC | Age: 61
End: 2021-06-16

## 2021-06-16 DIAGNOSIS — E78.5 HYPERLIPIDEMIA LDL GOAL <70: ICD-10-CM

## 2021-06-16 PROBLEM — I48.0 PAROXYSMAL ATRIAL FIBRILLATION (H): Status: ACTIVE | Noted: 2020-10-29

## 2021-06-16 PROBLEM — E11.9 DM (DIABETES MELLITUS) (H): Status: ACTIVE | Noted: 2018-11-02

## 2021-06-16 NOTE — PROGRESS NOTES
Pemafibrate to Reduce cardiovascular OutcoMes by reducing triglycerides IN patiENts with diabeTes (PROMINENT) clinical trial.    Subject seen in clinic today for study Visit 7 month 8.      Subject seen by provider Dr. Barber for study related physical exam.  See provider notes and flow sheets for vital signs.  Waist measures 126 cm today.    No labs required for this visit.     Study efficacy, endpoints and adverse events reviewed with subject.  Cardiovascular risk discussed. Study alcohol consumption stipulations and education reviewed with subject - he reports he doesn't drink.    Study medication box #'s and tablet counts returned by subject: 9347899 #70 tabs , 6949733 #14tabs, 1967239 #0 tabs, 1997080 #0 tabs  Tablet count of 84 for 90 % compliance.    Study medication box #'s dispensed to subject: [id number], 0037189, 6745009, 5472705.  Education provided on medication compliance and administration.    Plan: Will schedule study Visit 8 telephone call with subject in 8 weeks and  Visit 9 back in clinic in 4 months .    Presley Torres RN   Clinical Trials Nurse       ADVERSE EVENT 1 Description: Subject slipped on the ice last Tuesday and went to the ER to get checked over.  Subject reports he was released a few hours later with 3 days of Vicodin for pain.    ADVERSE EVENT 2 Description: Subject has history of recurrent kidney stones and removal surgery.  3 previous kidney stones removed and had routine scan in Oct 2017 which showed additional stones.  He was seen at [facility] in Feb 2018 for a planned left side kidney stone removal without incident.  Subject stayed for 1 night in the hospital and was released the next day. [facility] and [facility]. Subject history reports left [facility] in 2012, July 2016 and 8/1/2016.      Dr. Barber: Please assign causality of AE below:    Adverse Event: Adverse event   Fall (slipped on ice) Serious adverse event    Chronic renal calculi - Nephrolithotomy   Start Date: 27Feb2018 ~12Feb2018   End Date: 27Feb2018 ~13Feb2018   Severity (mild/moderate/severe): moderate serious   Serious?  Yes  []     No  [x]  Yes  [x]     No  []     Relationship: Is there a reasonable possibility that the event may have been caused by Investigational Medicinal Product?    Yes  []     No  [x]      Yes  []     No  [x]       Action taken with study treatment:   []Drug Interrupted  []Drug Withdrawal  []Not Applicable   []Unknown              [x]No Action []Drug Interrupted  []Drug Withdrawal  []Not Applicable   []Unknown              [x]No Action         Outcome:  []Not Recovered/Not Resolved  [x]Recovered/Resolved  []Recovered/Resolved with Sequelae   []Recovering/Resolving  []Unknown   []Fatal []Not Recovered/Not Resolved  [x]Recovered/Resolved  []Recovered/Resolved with Sequelae   []Recovering/Resolving  []Unknown   []Fatal   Medication or therapies taken:  Yes  [x]     No  []  Yes  [x]     No  []

## 2021-06-16 NOTE — PROGRESS NOTES
[facility] Follow-up Note    Assessment & Plan        1. Coronary artery disease due to lipid rich plaque -angiography 2017 showed 30% left main lesion, mid left anterior descending 30% lesion followed by a 95% lesion that received a drug-coated stent, distal left anterior descending 20% lesion and second diagonal 70% lesion.  The ramus had a 50% lesion, circumflex had a proximal 40% lesion, and the right coronary artery had a proximal 20 and mid 20% lesions.  His stress test done in May 2017 looked normal so work on prevention.  He is now off Plavix and continues to be asymptomatic and continue current therapy.   2. Benign essential hypertension -under good control on chlorthalidone, lisinopril, and metoprolol.   3. NSTEMI (non-ST elevated myocardial infarction) -this was of the anterior wall in 2017 and getting along well.   4. Obstructive sleep apnea -CPAP.   5. Uncontrolled type 2 diabetes mellitus with other diabetic kidney complication, unspecified long term insulin use status -defer to primary.   6.  Dyslipidemia-on atorvastatin in enrolled in the Prominent study which she is here for follow-up of today.    Plan  1.  Continue current medications.  2.  Work on weight loss.  3.  Follow-up with me per the Prominent study.    Subjective  CC: 57-year-old white gentleman being seen in follow-up of the Prominent study.  Since I seen him his wife has .  He is somewhat concerned and upset about this.  Otherwise he has no significant chest discomfort, palpitations, shortness breath, PND, orthopnea or peripheral edema.    Medications  Current Outpatient Prescriptions   Medication Sig Note     aspirin 81 MG EC tablet Take 81 mg by mouth daily.      atorvastatin (LIPITOR) 80 MG tablet Take 1 tablet (80 mg total) by mouth daily.      chlorthalidone (HYGROTEN) 25 MG tablet Take 12.5 mg by mouth daily.      cholecalciferol, vitamin D3, 1,000 unit tablet Take 1,000 Units by mouth daily.       "cyanocobalamin 1000 MCG tablet Take 1,000 mcg by mouth daily.      hydrOXYzine (ATARAX) 25 MG tablet Take 1 tablet (25 mg total) by mouth every 6 (six) hours as needed for anxiety.      insulin aspart (NOVOLOG) 100 unit/mL injection Inject 35 Units under the skin 3 (three) times a day before meals.  2/6/2017: Reports he takes 35 U ; 3 x day, with meals     insulin glargine (LANTUS) 100 unit/mL injection Inject 56 Units under the skin 2 (two) times a day.       liraglutide (VICTOZA) 0.6 mg/0.1 mL (18 mg/3 mL) PnIj injection Inject 1.8 mg under the skin daily.       lisinopril (PRINIVIL,ZESTRIL) 40 MG tablet Take 40 mg by mouth daily.      metFORMIN (GLUCOPHAGE) 1000 MG tablet Take 1 tablet (1,000 mg total) by mouth 2 (two) times a day with meals.      metoprolol succinate (TOPROL XL) 25 MG Take 1 tablet (25 mg total) by mouth daily.      minocycline (MINOCIN,DYNACIN) 100 MG capsule Take 100 mg by mouth 2 (two) times a day as needed.       potassium citrate (UROCIT-K) 5 mEq (540 mg) SR tablet Take 30 mEq by mouth 2 (two) times a day.       syringe accessory Misc Inject 3 each as directed.      tamsulosin (FLOMAX) 0.4 mg Cp24 Take 1 capsule (0.4 mg total) by mouth daily.        Objective  /60 (Patient Site: Right Arm, Patient Position: Sitting, Cuff Size: Adult Large)  Pulse 80  Resp 18  Ht 5' 11\" (1.803 m)  Wt (!) 251 lb (113.9 kg)  BMI 35.01 kg/m2    General Appearance:    Alert, cooperative, no distress, appears stated age, mild moderately obese   Head:    Normocephalic, without obvious abnormality, atraumatic   Throat:   Lips, mucosa, and tongue normal; teeth and gums normal   Neck:   Supple, symmetrical, trachea midline, no adenopathy;        thyroid:  No enlargement/tenderness/nodules; no carotid    bruit or JVD   Back:     Symmetric, no curvature, ROM normal, no CVA tenderness   Lungs:     Clear to auscultation bilaterally, respirations unlabored   Chest wall:    No tenderness or deformity   Heart:    " Regular rate and rhythm, S1 and S2 normal, no murmur, rub   or gallop   Abdomen:     Soft, non-tender, bowel sounds active all four quadrants,     no masses, no organomegaly   Extremities:   Normal, atraumatic, no cyanosis or edema   Pulses:   2+ and symmetric all extremities   Skin:   Skin color, texture, turgor normal, no rashes or lesions     Results    Lab Results personally reviewed   Lab Results   Component Value Date    CHOL 140 01/25/2017    CHOL 149 01/24/2017     Lab Results   Component Value Date    HDL 22 (L) 01/25/2017    HDL 26 (L) 01/24/2017     Lab Results   Component Value Date    LDLCALC 64 01/25/2017    LDLCALC 57 01/24/2017     Lab Results   Component Value Date    TRIG 269 (H) 01/25/2017    TRIG 330 (H) 01/24/2017     Lab Results   Component Value Date    WBC 7.5 10/26/2017    HGB 14.4 10/26/2017    HCT 41.0 10/26/2017     10/26/2017     Lab Results   Component Value Date    CREATININE 1.07 10/26/2017    BUN 22 10/26/2017     10/26/2017    K 4.0 10/26/2017    CO2 22 10/26/2017     Review of Systems:                                          As above otherwise negative

## 2021-06-17 ENCOUNTER — RECORDS - HEALTHEAST (OUTPATIENT)
Dept: ADMINISTRATIVE | Facility: OTHER | Age: 61
End: 2021-06-17

## 2021-06-17 ENCOUNTER — AMBULATORY - HEALTHEAST (OUTPATIENT)
Dept: CARDIOLOGY | Facility: CLINIC | Age: 61
End: 2021-06-17

## 2021-06-17 ENCOUNTER — OFFICE VISIT - HEALTHEAST (OUTPATIENT)
Dept: CARDIOLOGY | Facility: CLINIC | Age: 61
End: 2021-06-17

## 2021-06-17 DIAGNOSIS — E78.5 HYPERLIPIDEMIA LDL GOAL <70: ICD-10-CM

## 2021-06-17 DIAGNOSIS — Z00.6 EXAMINATION OF PARTICIPANT OR CONTROL IN CLINICAL RESEARCH: ICD-10-CM

## 2021-06-17 ASSESSMENT — MIFFLIN-ST. JEOR: SCORE: 1902.62

## 2021-06-17 NOTE — PROGRESS NOTES
Pemafibrate to Reduce cardiovascular OutcoMes by reducing triglycerides IN patiENts with diabeTes (PROMINENT) clinical trial.    ADVERSE EVENT Description: Joe's insurance no longer covers [employer] thus he transferred care to the [facility] this spring - requested records so this is from patient recall per phone call today.  He has ongoing issues with GERD and esophagitis, but awoke at 2am on 4/28/21 vomiting.  His nausea continued and he felt unwell, so drove himself to the ED.  There he developed crushing chest pain and was given nitroglycerine and morphine.  Later that day he received 2 stents to the right side of his heart.  He was put on clopidogrel 75mg daily and reports no issues since.  He is scheduling an ablation with an EP physician at the [facility] as well.    Adverse Event: Non-ST Elevation Myocardial Infarction  Serious:  [x] Yes   [] No  Reportable to IRB:   [] Yes   [x] No  Severity (mild/moderate/severe): severe   Date of Awareness: 5/11/21  Start Date: 4/28/21  End Date: 4/29/21  Action taken with study treatment:  [x] Dose Not Changed  Outcome:   []Not Recovered/Not Resolved  [x]Recovered/Resolved    Medication or therapies taken:  [x] Yes  Nitroglycerine, morphine, cath medications.    Dr. Barber: Please assign causality of above AE  Related to study drug?     [] Yes   [x] No     I believe this is an endpoint is it not?

## 2021-06-17 NOTE — TELEPHONE ENCOUNTER
Pemafibrate to Reduce cardiovascular OutcoMes by reducing triglycerides IN patiENts with diabeTes (PROMINENT) clinical trial.    Study telephone visit form:    Subject Number:   [id number]                                     Dr. Barber- PI      CONCOMITANT MEDICATIONS  ? Investigator to report if participant needs treatment with prohibited medications (Fibrates or other agents with PPAR-? agonist activity (e.g., saroglitazar); See protocol for exclusionary medications and actions to be taken.    VISIT SCHEDULING AND CONTACT CONFIRMATION  ? Confirm date of next study visit   ? Confirm information on Subject Information Form or update as needed      Visit Number:_[id number]______  Visit Date: 21      Was the subject, relatives or health care provider (as per consent) contacted by phone?    [x] Yes  [] No    Yes [x]  No []   If Yes, Date of Contact:   Date: ___21_ ____ ____   [facility] YYYY  If No, please document attempts to contact subject (include date and type of contact)   Date: ____ ____ ____   Type: [] Tel _ [] Other___  [] 2 Date:      Name of Person: Contacted:   _____Self/Subject_______________   Relationship to Subject:   ______________________  Type:[]  Tel[x]  Other _______   Date: __21__ ____ ____   Type: [] Tel [] Other _______    Subject Status on the day assessed     [x] Subject alive  []  Subject    [] Unknown at present Please complete  Death Details Form in InForm and [facility]        Review Subject Contact Information Form and update as needed   [x]  Review concomitant medication use   [x]  Query participant and record any reported AEs   [x] Query participant and record any CV efficacy events   [x] Confirm next study visit: ______21_______   [x] Instruct participants to bring all study supplies with them to the next in-person visit    Instruct participants to bring all study supplies with them to the next in-person visit   Instruct participants to fast for ? 8  hours immediately prior to next in-person visit (i.e nothing by mouth, except water and essential medications).     Presley Torres RN

## 2021-06-17 NOTE — TELEPHONE ENCOUNTER
Telephone Encounter by Yue Arevalo RN at 9/17/2020  2:42 PM     Author: Yue Arevalo RN Service: -- Author Type: Registered Nurse    Filed: 9/17/2020  4:12 PM Encounter Date: 9/17/2020 Status: Addendum    : Yue Arevalo RN (Registered Nurse)    Related Notes: Original Note by Yue Arevalo RN (Registered Nurse) filed at 9/17/2020  2:42 PM       ----- Message from Presley Torres RN sent at 9/17/2020  1:44 PM CDT -----  Regarding: Med alternate request  Hi Joe Bearden left a message for me earlier this week when I was out asking about a medication Dr. Barber rx for him but he can't afford the cost.  He asked that I pass the message along to find an alternative.  He is in our research study but I'm not sure of the details on this new prescription.    Sounds like a new rx sent to the [facility].    Thanks,  Presley Torres RN         Conversation: Test Results   (Newest Message First)   Monisha Ritter RN           9/14/20 11:57 AM   Note      ----- Message from Yeny Barber MD sent at 9/11/2020  2:44 PM CDT -----  I called this 60-year-old gentleman with history of coronary artery disease and sleep apnea to go over results of his monitor.  I did tell him that we saw over 12 hours of atrial fibrillation on one occasion with some brief episodes another occasions.  I did ask him to continue to work on weight loss as well as treatment for sleep apnea.  Given that his CHADS 2 VASC score is 2 on the basis of diabetes and hypertension possibly 3 adding coronary artery disease I think he would benefit from Eliquis 5 mg p.o. twice daily.  Can you arrange for his pharmacy to give him a one-week supply, 14 tablets with 1 refill, if he tolerates it and is able to pay for it then let us go for a 90-day prescription.  Thank you  LF  --------------------------------------  Pt agreeable to trial of Eliquis. Small supply sent to preferred pharmacy. Pt will call with update on  how this medications is tolerated next week. -kcl                Called patient and he is agreeable to at least a 30 day free trial card. He has [employer] for drug coverage and then Medicare A and B for other. Will see if he qualifies for 10 dollar co-pay card and keep him posted. -[facility]         Addendum: Called [employer] and he has [employer] part D coverage so he does not qualify for 10 dollar co-pay card. For a 30 day supply, it would be 47 dollars. Writer at least got him 30 days for free with the free trial card.         Addendum 1611: Called patient and updated on 30 day free trial card. He is agreeable to start with this. He will contact [facility] with [facility] to determine his coverage with them. Routed [facility] and [facility] encounter to [facility] Nunez to start. Will plan to check in with him in 2 weeks to see how he is tolerating and what status is with the [facility] and his medication coverage. -[facility]

## 2021-06-18 NOTE — PROGRESS NOTES
Pemafibrate to Reduce cardiovascular OutcoMes by reducing triglycerides IN patiENts with diabeTes (PROMINENT) clinical trial.    Follow up to records request from study telephone visit 8 information.      Research Hotline: 504.787.9004  Presley Torres RN  Clinical Trials Nurse    ADVERSE EVENT Description: Subject reported ongoing cough for several weeksand went to his PCP on April 5th where it was determined he had Bronchitis. Was given Z-Braden 250mg QD for 5 days and symptoms started to improve but then came back.  Saw his PCP again on 4/16/18 and was given  Doxycycline 100mg BID for 5 days and Tessalon Perles 100mg BID for cough.  Subject reports symptoms resolves fully after these medications.    Adverse Event:  Bronchitis  Serious:  [] Yes   [x] No  Severity(mild/moderate/severe): moderate   Start Date: 3/20/2018  End Date: 4/21/2018  Action taken with study treatment:              [x]No Action/Not applicable  Outcome:   [x]Recovered/Resolved    Medication or therapies taken:  [x] Yes   [] No    Dr. Barber: Please assign causality of above AE  Relationship: Is there a reasonable possibility that the event may have been caused by: Answer No or Yes  1. Investigational Medicinal Product?  [] Yes   [x] No

## 2021-06-19 NOTE — PROGRESS NOTES
Pemafibrate to Reduce cardiovascular OutcoMes by reducing triglycerides IN patiENts with diabeTes (PROMINENT) clinical trial.    Subject seen in clinic today for study Visit 9.      Subject seen by provider Julia Marie for study related physical exam.  See provider note and flow sheets for vital signs.    Labs drawn per protocol.  Urine sample collected.  Results will be scanned into 'media'.     Waist measures 123 cm today.      EQ-5D-5L questionnaire completed     Study efficacy, endpoints and adverse events reviewed with subject.  Cardiovascular risk discussed.  Subject has been doing well on eating healthier and being more active, provider plans for him to come off Chlorthalidone in response to this.    Study alcohol consumption stipulations and education reviewed with subject:    Subject reports  [] Never [x] Current [] Former. 4 drinks per [] Day [] Week [] Month [x] Occasional. 4 maximum drinks per sitting. Subject reports he never drinks, but did go out with friendly recently and have drinks which is rare for him.    Study medication box # [id number] with 70 tabs  Study medication box # [id number] with 10 tabs  Study medication box # [id number] with 0 tabs  Study medication box # [id number] with 0 tabs  returned by subject.  Total tablet count of 70 for 83 % compliance.  Study medication box #'s [id number], 6021197, 1543931, 4896483 dispensed to subject.  Education provided on medication compliance and administration.  Reinforced taking medication each day and explained compliance percentages.    Plan: Study Visit 10 telephone call with subject in 2 months.  Will schedule study Month 11 Visit  with subject in 12 weeks back at [facility].    Presley Torres RN  Clinical Trials Nurse

## 2021-06-19 NOTE — PROGRESS NOTES
"Assessment/Plan:     Discontinue chlorthalidone due to symptomatic hypotension.  He was instructed to increase his daily fluid intake and follow-up with his PCP. He will continue to follow-up with research per protocol for PROMINENT study.    Subjective:     Man JOSEPH Acevedo Sr. is seen at [facility] today for PROMINENT study, visit 9. Pemafibrate to Reduce cardiovascular OutcoMes by reducing triglycerides IN patiENts with diabeTes (PROMINENT) clinical trial.  He has a history of coronary artery disease with previous MI and stents, hypertension, type 2 diabetes, and hyperlipidemia.   He states that he has significantly changed his diet and is making a concerted effort to lose weight.  For about the last month, his blood pressures have been running low, yesterday 88/64, associated with significant orthostatic lightheadedness.  He reports 3 episodes of syncope or near syncope associated with standing up quickly.  Episodes occurred while out in the heat and sun.  He reports checking his blood sugars right afterwards which were \"fine\".  He did have one morning blood sugar of 47.  He denies fatigue, shortness of breath, dyspnea on exertion, orthopnea, PND, palpitations, chest pain, abdominal fullness/bloating and lower extremity edema.      Patient Active Problem List   Diagnosis     Peripheral Neuropathy     Nonalcoholic Fatty Liver Disease     Lower Back Pain     Adjustment Disorder With Anxiety And Depressed Mood     Neck Pain     Dyslipidemia     Uncontrolled type 2 diabetes mellitus with other diabetic kidney complication, unspecified long term insulin use status (H)     Pain in joint, ankle and foot     Obstructive sleep apnea     Right carpal tunnel syndrome     Left hand weakness     Tennis elbow     Benign essential hypertension     Obesity due to excess calories     Coronary artery disease due to lipid rich plaque     NSTEMI (non-ST elevated myocardial infarction) (H)     Tobacco use       Past " "Medical History:   Diagnosis Date     Coronary artery disease      Diabetes mellitus (H) 01/2005    adult onset     GERD (gastroesophageal reflux disease)      High cholesterol      Hyperlipidemia      Hypertension      Kidney stones      Liver disease      Myocardial infarction      Neuropathy of foot      Obesity      DEWAYNE on CPAP      Peripheral Neuropathy     Created by Conversion      Sleep apnea        Past Surgical History:   Procedure Laterality Date     CARDIAC CATHETERIZATION       CARPAL TUNNEL RELEASE Right 04/12/2016     CORONARY STENT PLACEMENT       KIDNEY STONE SURGERY       KNEE ARTHROSCOPY Left 8/6/15    [facility] meniscectoy[facility]     LEG SURGERY       Percutaneous Lithotomy  Left 07/11/2014     NE AMPUTATE METACARPAL+FINGER      Description: Metacarpal Amputation And Index Finger;  Recorded: 08/15/2014;  Comments: For \"cartilage cancer\"     NE CATH PLACEMENT & NJX CORONARY ART ANGIO IMG S&I N/A 1/24/2017    Procedure: Coronary Angiogram;  Surgeon: Melissa Freeman MD;  Location: [facility] Cath Lab;  Service: Cardiology     NE L HRT CATH W/NJX L VENTRICULOGRAPHY IMG S&I N/A 1/24/2017    Procedure: Left Heart Catheterization with Left Ventriculogram;  Surgeon: Melissa Freeman MD;  Location: [facility] Cath Lab;  Service: Cardiology     NE LAP,CHOLECYSTECTOMY      Description: Cholecystectomy Laparoscopic;  Proc Date: 12/11/2013;     NE PERCUT REMV KID STONE,UP TO 2 CM      Description: Percutaneous Lithotomy;  Proc Date: 11/05/2012;       History reviewed. No pertinent family history.    Social History     Social History     Marital status:      Spouse name: N/A     Number of children: N/A     Years of education: 13     Occupational History     Not on file.     Social History Main Topics     Smoking status: Former Smoker     Packs/day: 0.50     Smokeless tobacco: Never Used      Comment: Patient reports he is trying to quit.     Alcohol use No "     Drug use: No     Sexual activity: Not on file     Other Topics Concern     Not on file     Social History Narrative    Wife of 21 years passed away on 10/15/2017. Had 8 kids with her - 3 of hers from a previous marriage, 3 of his from a previous marriage, and 2 kids they adopted together. He has 20 grandchildren.        Current Outpatient Prescriptions   Medication Sig Dispense Refill     aspirin 81 MG EC tablet Take 81 mg by mouth daily.       atorvastatin (LIPITOR) 80 MG tablet Take 1 tablet (80 mg total) by mouth daily. 30 tablet 1     cholecalciferol, vitamin D3, 1,000 unit tablet Take 1,000 Units by mouth daily.       cyanocobalamin 1000 MCG tablet Take 1,000 mcg by mouth daily.       hydrOXYzine (ATARAX) 25 MG tablet Take 1 tablet (25 mg total) by mouth every 6 (six) hours as needed for anxiety. 15 tablet 0     insulin aspart (NOVOLOG) 100 unit/mL injection Inject 35 Units under the skin 3 (three) times a day before meals.        insulin glargine (LANTUS) 100 unit/mL injection Inject 56 Units under the skin 2 (two) times a day.        liraglutide (VICTOZA) 0.6 mg/0.1 mL (18 mg/3 mL) PnIj injection Inject 1.8 mg under the skin daily.        lisinopril (PRINIVIL,ZESTRIL) 40 MG tablet Take 40 mg by mouth daily.       metFORMIN (GLUCOPHAGE) 1000 MG tablet Take 1 tablet (1,000 mg total) by mouth 2 (two) times a day with meals. 60 tablet 6     metoprolol succinate (TOPROL XL) 25 MG Take 1 tablet (25 mg total) by mouth daily. 30 tablet 0     minocycline (MINOCIN,DYNACIN) 100 MG capsule Take 100 mg by mouth 2 (two) times a day as needed.        potassium citrate (UROCIT-K) 5 mEq (540 mg) SR tablet Take 30 mEq by mouth 2 (two) times a day.        Study Drug pemafibrate 0.2 mg/placebo (PROMINENT) tablet Take 0.2 mg by mouth 2 (two) times a day.       syringe accessory Misc Inject 3 each as directed.       tamsulosin (FLOMAX) 0.4 mg Cp24 Take 1 capsule (0.4 mg total) by mouth daily. 14 capsule 0     Current  Facility-Administered Medications   Medication Dose Route Frequency Provider Last Rate Last Dose     Study Drug pemafibrate 0.2 mg/placebo tablet 0.2 mg (PROMINENT)  0.2 mg Oral BID Presley Torres RN         Study Drug pemafibrate 0.2 mg/placebo tablet 0.2 mg (PROMINENT)  0.2 mg Oral BID Presley Torres RN           Allergies   Allergen Reactions     Gabapentin      Tolmetin Unknown     Pt is on Plavix       Objective:     Vitals:    07/09/18 0741   BP: 104/70   Pulse: 84   Resp: 18     Wt Readings from Last 3 Encounters:   07/09/18 (!) 243 lb (110.2 kg)   03/08/18 (!) 251 lb (113.9 kg)   01/12/18 (!) 253 lb (114.8 kg)       General Appearance:   Alert, cooperative and in no acute distress.   HEENT:  No scleral icterus, EOM intact, PERRL; the mucous membranes were pink and moist. Cervical lymph nodes nontender and nonpalpable.   Neck: Supple.  JVP normal.  No HJR.  No obvious thyromegaly.     Chest: The spine was straight. The chest was symmetric.   Lungs:   Respirations unlabored; the lungs are clear to auscultation.   Cardiovascular:   Regular rhythm. S1 and S2 without murmur, clicks or rubs. Radial, carotid and posterior tibial pulses are intact and symmetrical.  No carotid bruits noted.   Abdomen:  Soft, nontender, nondistended, bowel sounds present   Extremities: No cyanosis, clubbing or edema.   Musculoskeletal:  Moves all extremities.   Skin: No bruising or wounds.   Neurologic: Mood and affect are appropriate.             Julia Marie CNP    This note has been dictated using voice recognition software. Any grammatical, typographical, or context distortions are unintentional and inherent to the software.

## 2021-06-20 NOTE — PROGRESS NOTES
Pemafibrate to Reduce cardiovascular OutcoMes by reducing triglycerides IN patiENts with diabeTes (PROMINENT) clinical trial.     Presley Torres RN  Clinical Trials Nurse     ADVERSE EVENT Description:  Subject has history of reoccuring kidney stones.  He experienced right sided flank pain after lifting heavy objects at work.  He went to ED and was given pain medication: EKG, abdm CT and lab work were negative.  Pain subsided after Oxycodone and physician believed this was musculoskeletal in nature.      Adverse Event: Right sided flank pain  Serious:  [] Yes   [x] No  Severity  (mild/moderate/severe): moderate   Start Date: 07Aug2018  End Date: 07Aug2018  Action taken with study treatment:  [x] Dose not changed    Outcome:   []Not Recovered/Not Resolved  [x]Recovered/Resolved    Medication or therapies taken:  [x] Yes    Oxycodone 10mg at ED     Dr. Barber: Please assign causality of above AE  Relationship: Is there a reasonable possibility that the event may have been caused by Investigational Medicinal Product?  [] Yes   [x] No

## 2021-06-21 NOTE — PROGRESS NOTES
Assessment/Plan:     No changes to his medications today. He will continue to follow-up with research per protocol for PROMINENT study.  Follow-up with primary care provider next week and will discuss chronic fatigue.    Subjective:     Man RUIZ Curtis Geiger is seen at [facility] today for PROMINENT research study.  He has a history of hypertension, coronary artery disease, dyslipidemia, obstructive sleep apnea, and diabetes.  He has had chronic fatigue for the past 6-7 months.  He is seeing his primary care provider next week to further evaluate fatigue.  Blood pressure elevated today at 162/90 with a recheck of 132/82.  He checks his blood pressure a few times weekly at home and states that his blood pressures are typically 120s-130s/70s-80.  He denies lightheadedness, shortness of breath, dyspnea on exertion, chest pain and lower extremity edema.      Patient Active Problem List   Diagnosis     Peripheral Neuropathy     Nonalcoholic Fatty Liver Disease     Lower Back Pain     Adjustment Disorder With Anxiety And Depressed Mood     Neck Pain     Dyslipidemia     Uncontrolled type 2 diabetes mellitus with other diabetic kidney complication, unspecified long term insulin use status     Pain in joint, ankle and foot     Obstructive sleep apnea     Right carpal tunnel syndrome     Left hand weakness     Tennis elbow     Benign essential hypertension     Obesity due to excess calories     Coronary artery disease due to lipid rich plaque     NSTEMI (non-ST elevated myocardial infarction) (H)     Tobacco use     DM (diabetes mellitus) (H)       Past Medical History:   Diagnosis Date     Coronary artery disease 24Jan2018     Diabetes mellitus (H) 01/2005    adult onset     GERD (gastroesophageal reflux disease)      High cholesterol      Hyperlipidemia 2008     Hypertension 2006     Kidney stones 2012     Liver disease      Myocardial infarction (H)      Neuropathy of foot 07/16/2014     Obesity      DEWAYNE on CPAP   "    Peripheral Neuropathy     Created by Conversion      Sleep apnea        Past Surgical History:   Procedure Laterality Date     CARDIAC CATHETERIZATION  01/24/2017     CARPAL TUNNEL RELEASE Right 04/12/2016     CORONARY STENT PLACEMENT       KIDNEY STONE SURGERY Left 02/05/2018    stent placement, lithotripsy, nephrolithotomy     KNEE ARTHROSCOPY Left 8/6/15    Partial medial meniscectoy, [facility]     LEG SURGERY       Percutaneous Lithotomy  Left 07/11/2014     NH AMPUTATE METACARPAL+FINGER      Description: Metacarpal Amputation And Index Finger;  Recorded: 08/15/2014;  Comments: For \"cartilage cancer\"     NH CATH PLACEMENT & NJX CORONARY ART ANGIO IMG S&I N/A 1/24/2017    Procedure: Coronary Angiogram;  Surgeon: Melissa Freeman MD;  Location: [facility] Cath Lab;  Service: Cardiology     NH L HRT CATH W/NJX L VENTRICULOGRAPHY IMG S&I N/A 1/24/2017    Procedure: Left Heart Catheterization with Left Ventriculogram;  Surgeon: Melissa Freeman MD;  Location: [facility] Cath Lab;  Service: Cardiology     NH LAP,CHOLECYSTECTOMY      Description: Cholecystectomy Laparoscopic;  Proc Date: 12/11/2013;     NH PERCUT REMV KID STONE,UP TO 2 CM      Description: Percutaneous Lithotomy;  Proc Date: 11/05/2012;       No family history on file.    Social History     Social History     Marital status:      Spouse name: N/A     Number of children: N/A     Years of education: 13     Occupational History     Not on file.     Social History Main Topics     Smoking status: Former Smoker     Packs/day: 0.50     Smokeless tobacco: Never Used      Comment: Patient reports he is trying to quit.     Alcohol use No     Drug use: No     Sexual activity: Not on file     Other Topics Concern     Not on file     Social History Narrative    Wife of 21 years passed away on 10/15/2017. Had 8 kids with her - 3 of hers from a previous marriage, 3 of his from a previous marriage, and 2 kids they adopted " together. He has 20 grandchildren.        Current Outpatient Prescriptions   Medication Sig Dispense Refill     aspirin 81 MG EC tablet Take 81 mg by mouth daily.       atorvastatin (LIPITOR) 80 MG tablet Take 1 tablet (80 mg total) by mouth daily. 30 tablet 1     cholecalciferol, vitamin D3, 1,000 unit tablet Take 1,000 Units by mouth daily.       cyanocobalamin 1000 MCG tablet Take 1,000 mcg by mouth daily.       hydrOXYzine (ATARAX) 25 MG tablet Take 1 tablet (25 mg total) by mouth every 6 (six) hours as needed for anxiety. 15 tablet 0     insulin aspart (NOVOLOG) 100 unit/mL injection Inject 35 Units under the skin 3 (three) times a day before meals.        insulin glargine (LANTUS) 100 unit/mL injection Inject 56 Units under the skin 2 (two) times a day.        liraglutide (VICTOZA) 0.6 mg/0.1 mL (18 mg/3 mL) PnIj injection Inject 1.8 mg under the skin daily.        lisinopril (PRINIVIL,ZESTRIL) 40 MG tablet Take 40 mg by mouth daily.       metFORMIN (GLUCOPHAGE) 1000 MG tablet Take 1 tablet (1,000 mg total) by mouth 2 (two) times a day with meals. 60 tablet 6     metoprolol succinate (TOPROL XL) 25 MG Take 1 tablet (25 mg total) by mouth daily. 30 tablet 0     minocycline (MINOCIN,DYNACIN) 100 MG capsule Take 100 mg by mouth 2 (two) times a day as needed.        potassium citrate (UROCIT-K) 5 mEq (540 mg) SR tablet Take 30 mEq by mouth 2 (two) times a day.        Study Drug pemafibrate 0.2 mg/placebo (PROMINENT) tablet Take 0.2 mg by mouth 2 (two) times a day.       syringe accessory Misc Inject 3 each as directed.       tamsulosin (FLOMAX) 0.4 mg Cp24 Take 1 capsule (0.4 mg total) by mouth daily. 14 capsule 0     Current Facility-Administered Medications   Medication Dose Route Frequency Provider Last Rate Last Dose     Study Drug pemafibrate 0.2 mg/placebo tablet 0.2 mg (PROMINENT)  0.2 mg Oral BID Presley Torres RN           Allergies   Allergen Reactions     Gabapentin      Tolmetin Unknown     Pt is on  Plavix       Objective:     Vitals:    11/02/18 0925   BP: 132/82   Pulse:    Resp:      Wt Readings from Last 3 Encounters:   11/02/18 (!) 252 lb (114.3 kg)   08/07/18 (!) 250 lb (113.4 kg)   07/09/18 (!) 243 lb (110.2 kg)       General Appearance:   Alert, cooperative and in no acute distress.   HEENT:  No scleral icterus; the mucous membranes were pink and moist. Cervical lymph nodes nontender and nonpalpable.   Neck: JVP normal. No HJR.   Chest: The spine was straight. The chest was symmetric.   Lungs:   Respirations unlabored; the lungs are clear to auscultation.   Cardiovascular:   Regular rhythm. S1 and S2 without murmur, clicks or rubs. Radial, carotid and posterior tibial pulses are intact and symmetrical. No carotid bruits noted   Abdomen:  Soft, nontender, nondistended, bowel sounds present   Extremities: No cyanosis, clubbing.  Trace ankle edema   Skin: No bruising or wounds   Neurologic: Mood and affect are appropriate.               Yvonne Gilliland, CNP

## 2021-06-21 NOTE — PROGRESS NOTES
Pemafibrate to Reduce cardiovascular OutcoMes by reducing triglycerides IN patiENts with diabeTes (PROMINENT) clinical trial.    Subject seen in clinic today for study Visit 11.      Subject seen by provider Yvonne Gilliland for study related physical exam.  See provider note and flow sheets for vital signs.  Blood pressure was initially high - subject related a stressful drive in today.  Subsequent measurements were lower.  Advised to continue home monitoring which subject reports typically in 120's/80.  Waist measures 129 cm    Labs drawn per protocol.  Results will be scanned into 'media'.     Study efficacy, endpoints and adverse events reviewed with subject.  Cardiovascular risk discussed.     Study alcohol consumption stipulations and education reviewed with subject:    Subject reports  [] Never [x] Current [] Former   4 drinks per [] Day [] Week [] Month [x] Occasional.  4 maximum drinks per sitting.     Study medication box # [id number] with 38 tabs  Study medication box # [id number] with 0 tabs  Study medication box # [id number] with 0 tabs  Study medication box # [id number] with 0 tabs  returned by subject.  Total tablet count of 38 for 100% compliance.  Study medication box #'s [id number], [id number], [id number], [id number] dispensed to subject.  Education provided on medication compliance and administration    Plan: Study Visit 12 telephone call with subject in 2 months.  Will schedule study  Visit 13  with subject in 4 months back at [facility].    Presley Torres RN  Clinical Trials Nurse

## 2021-06-23 NOTE — PROGRESS NOTES
Pemafibrate to Reduce cardiovascular OutcoMes by reducing triglycerides IN patiENts with diabeTes (PROMINENT) clinical trial.    ADVERSE EVENT Description: The subject slipped and fell on ice. His feet slipped from under him and he landed on his right hip and shoulder. He also hit his head against his car. He denies headache and loss of consciousness from hitting his head.  Xrays came back negative and vitals were normal except pain of 9/10.  He was given 1 percocet in the ED.  Subject was discharged home and given instruction to return if symptoms returned.    Adverse Event:  Fall - slipped  Serious:  [] Yes   [x] No  Severity (mild/moderate/severe): moderate   Start Date: 05Feb2019  End Date: 05Feb2019  Action taken with study treatment:  []Drug Interrupted  []Drug Withdrawal  []Not Applicable   []Unknown              [x]No Action  Outcome:   []Not Recovered/Not Resolved  [x]Recovered/Resolved  []Recovered/Resolved with Sequelae    Medication or therapies taken:  [x] Yes  ,oxycodone  Dr. Barber: Please assign causality of above AE  Relationship: Is there a reasonable possibility that the event may have been caused by: Answer No or Yes  1. Investigational Medicinal Product?  [] Yes   [x] No       Research Hotline: 773.352.5108  Presley Torres RN  Clinical Trials Nurse

## 2021-06-23 NOTE — TELEPHONE ENCOUNTER
Pemafibrate to Reduce cardiovascular OutcoMes by reducing triglycerides IN patiENts with diabeTes (PROMINENT) clinical trial.    Study telephone visit form:    Subject Number:   [id number]                                     Dr. Barber- PI      CONCOMITANT MEDICATIONS  ? Investigator to report if participant needs treatment with prohibited medications (Fibrates or other agents with PPAR-? agonist activity (e.g., saroglitazar); See protocol for exclusionary medications and actions to be taken.    VISIT SCHEDULING AND CONTACT CONFIRMATION  ? Confirm date of next study visit   ? Confirm information on Subject Information Form or update as needed      Visit Number:___V12____  Visit Date: 2019      Was the subject, relatives or health care provider (as per consent) contacted by phone?    [x] Yes  [] No    Yes [x]  No []   If Yes, Date of Contact:   Date: ___2019___ ____   [facility] YYYY  If No, please document attempts to contact subject (include date and type of contact)   1 Date: ____ ____ ____   Type: [] Tel _ [] Other___  [] 2 Date:      Name of Person: Contacted:   _____Self/Subject_______________   Relationship to Subject:   ______________________  Type:[]  Tel[x]  Other _______   3 Date: ____ ____ ____   Type: [] Tel [] Other _______    Subject Status on the day assessed     [x] Subject alive  []  Subject    [] Unknown at present Please complete  Death Details Form in [facility] and [facility]        Review Subject Contact Information Form and update as needed   [x]  Review concomitant medication use   [x]  Query participant and record any reported AEs   [x] Query participant and record any CV efficacy events   [x] Confirm next study visit: ____to schedule_________   [x] Instruct participants to bring all study supplies with them to the next in-person visit    Instruct participants to bring all study supplies with them to the next in-person visit   Instruct participants to fast for ? 8  hours immediately prior to next in-person visit (i.e nothing by mouth, except water and essential medications).     Presley Torres RN

## 2021-06-23 NOTE — PROGRESS NOTES
"Pemafibrate to Reduce cardiovascular OutcoMes by reducing triglycerides IN patiENts with diabeTes (PROMINENT) clinical trial.    In follow up to v12 phone discussion with subject [phone]    Dr. Barber: Please assign causality of AE below:  The subject was at his friend's house, preparing to go to bed, when he suddenly became lightheaded so he checked his blood pressure which he states read 187/48 mmHg. Prior to arriving to the ED, he repeated the BP reading which gave a reading of 177/105 mmHg. Additionally, the subject complained of upper back pain \"between shoulder blades\" that was a dull, achy quality but resolved while at ED.  Vitals were normal at ED.  Lab work up was negative and CTA of chest an abdomin mostly negative but did notice masslike area in left kidney (has significant history of stones).  Subject was discharged without further complaint.    Adverse Event: Adverse event   presynocope Adverse event   Hypertension exacerbation Adverse event:      Acute midline thoracic back pain    Start Date: 01Dec2018 01Dec2018 01Dec2018   End Date: 01Dec2018 01Dec2018 01Dec2018   Severity (mild/moderate/severe): moderate  moderate  moderate    Serious?  Yes  []     No  [x]  Yes  []     No  [x]  Yes  []     No  [x]     Relationship: Is there a reasonable possibility that the event may have been caused by Investigational Medicinal Product?    Yes  []     No  [x]      Yes  []     No  [x]      Yes  []     No  [x]     Action taken with study treatment:   []Drug Interrupted  []Drug Withdrawal  []Not Applicable   []Unknown              [x]No Action []Drug Interrupted  []Drug Withdrawal  []Not Applicable   []Unknown              [x]No Action []Drug Interrupted  []Drug Withdrawal  []Not Applicable   []Unknown              [x]No Action         Outcome:  []Not Recovered/Not Resolved  [x]Recovered/Resolved   []Not Recovered/Not Resolved  [x]Recovered/Resolved   []Not Recovered/Not Resolved  [x]Recovered/Resolved   Medication " or therapies taken:  Yes  []     No  [x]  Yes  []     No  [x]  Yes  []     No  [x]

## 2021-06-24 NOTE — PROGRESS NOTES
Assessment/Plan:     No changes to his medications today. He will continue to follow-up with research per protocol for Pemafibrate to Reduce cardiovascular OutcoMes by reducing triglycerides IN patiENts with diabeTes (PROMINENT) clinical trial study.  He has no health concerns today except he was recently diagnosed with left renal cancer.  [facility].  We do not have any records of his oncology consult and notes at this point.  We will request medical record to obtain his notes, lab work.  He continues to smoke every day.  We discussed in length about the adverse effect of smoking on his cardiovascular health, lung cancer, and risk and  complications to his upcoming surgery for his renal carcinoma.  Subjective:     Man Acevedo Sr. is a 58 years old with a significant PMH of coronary artery disease with non-STEMI and stent placement 2 years ago, hypertension, diabetes type 2, obesity, DEWAYNE with CPAP use, peripheral neuropathy, nonalcoholic fatty liver disease, dyslipidemia, and recent diagnosis of renal carcinoma who is seen at [facility] today for Pemafibrate to Reduce cardiovascular OutcoMes by reducing triglycerides IN patiENts with diabeTes (PROMINENT) clinical trial study follow-up.    Patient visited ED back in December with pain between his shoulder blade.  He was noted to be in hypertensive urgency.  His workup including EKG and cardiac enzymes were negative for acute changes.  However, his chest CT abdomen and pelvis showed renal mass in the left kidney suspect for malignancy.  Per patient, he had a urology and oncology consult and was confirmed with renal carcinoma and planning to [facility]  He [facility] Presley Tan and [facility].  At this point, we do not have access to his oncology notes and records [facility] the [facility].    He was back in the ED just a few days ago with  fall.  He slipped on ice but showed no acute fracture.  He denies any presyncope or syncope or any cardiac symptoms with a fall episode.    Otherwise, he said he has been doing well from cardiac standpoint.  He recently met with a dietitian and working on eating heart healthy diet and weight loss.  He has managed to lost about 14 pounds since November 2019.  He reports his blood sugars been well controlled.  His goal is to decrease his weight down to 200 pounds.  He continues to smoke every day.  He is working on smoking cessation and waiting for the [facility].    He denies fatigue, lightheadedness, shortness of breath, dyspnea on exertion, orthopnea, PND, palpitations, chest pain, abdominal fullness/bloating and lower extremity edema.      Patient Active Problem List   Diagnosis     Peripheral Neuropathy     Nonalcoholic Fatty Liver Disease     Lower Back Pain     Adjustment Disorder With Anxiety And Depressed Mood     Neck Pain     Dyslipidemia     Pain in joint, ankle and foot     Obstructive sleep apnea     Right carpal tunnel syndrome     Left hand weakness     Tennis elbow     Benign essential hypertension     Obesity due to excess calories     Coronary artery disease due to lipid rich plaque     NSTEMI (non-ST elevated myocardial infarction) (H)     Tobacco use     DM (diabetes mellitus) (H)       Past Medical History:   Diagnosis Date     Coronary artery disease 24Jan2018     Diabetes mellitus (H) 01/2005    adult onset     GERD (gastroesophageal reflux disease)      High cholesterol      Hyperlipidemia 2008     Hypertension 2006     Kidney stones 2012     Liver disease      Myocardial infarction (H)      Neuropathy of foot 07/16/2014     Obesity      DEWAYNE on CPAP      Peripheral Neuropathy     Created by Conversion      Sleep apnea      Uncontrolled type 2 diabetes mellitus with other diabetic kidney complication, unspecified long term insulin use status     Created by Conversion        Past  "Surgical History:   Procedure Laterality Date     CARDIAC CATHETERIZATION  01/24/2017     CARPAL TUNNEL RELEASE Right 04/12/2016     CORONARY STENT PLACEMENT       KIDNEY STONE SURGERY Left 02/05/2018    stent placement, lithotripsy, nephrolithotomy     KNEE ARTHROSCOPY Left 8/6/15    Partial medial meniscectoy, foreign body removal[facility]     LEG SURGERY       Percutaneous Lithotomy  Left 07/11/2014     PA AMPUTATE METACARPAL+FINGER      Description: Metacarpal Amputation And Index Finger;  Recorded: 08/15/2014;  Comments: For \"cartilage cancer\"     PA CATH PLACEMENT & NJX CORONARY ART ANGIO IMG S&I N/A 1/24/2017    Procedure: Coronary Angiogram;  Surgeon: Melissa Freeman MD;  Location: [facility];  Service: Cardiology     PA L HRT CATH W/NJX L VENTRICULOGRAPHY IMG S&I N/A 1/24/2017    Procedure: Left Heart Catheterization with Left Ventriculogram;  Surgeon: Melissa Freeman MD;  Location: [facility];  Service: Cardiology     PA LAP,CHOLECYSTECTOMY      Description: Cholecystectomy Laparoscopic;  Proc Date: 12/11/2013;     PA PERCUT REMV KID STONE,UP TO 2 CM      Description: Percutaneous Lithotomy;  Proc Date: 11/05/2012;       No family history on file.    Social History     Socioeconomic History     Marital status:      Spouse name: Not on file     Number of children: Not on file     Years of education: 13     Highest education level: Not on file   Occupational History     Not on file   Social Needs     Financial resource strain: Not on file     Food insecurity:     Worry: Not on file     Inability: Not on file     Transportation needs:     Medical: Not on file     Non-medical: Not on file   Tobacco Use     Smoking status: Former Smoker     Packs/day: 0.50     Smokeless tobacco: Never Used     Tobacco comment: Patient reports he is trying to quit.   Substance and Sexual Activity     Alcohol use: No     Drug use: No     Sexual activity: Not on file   Lifestyle     " Physical activity:     Days per week: Not on file     Minutes per session: Not on file     Stress: Not on file   Relationships     Social connections:     Talks on phone: Not on file     Gets together: Not on file     Attends Muslim service: Not on file     Active member of club or organization: Not on file     Attends meetings of clubs or organizations: Not on file     Relationship status: Not on file     Intimate partner violence:     Fear of current or ex partner: Not on file     Emotionally abused: Not on file     Physically abused: Not on file     Forced sexual activity: Not on file   Other Topics Concern     Not on file   Social History Narrative    Wife of 21 years passed away on 10/15/2017. Had 8 kids with her - 3 of hers from a previous marriage, 3 of his from a previous marriage, and 2 kids they adopted together. He has 20 grandchildren.        Current Outpatient Medications   Medication Sig Dispense Refill     aspirin 81 MG EC tablet Take 81 mg by mouth daily.       atorvastatin (LIPITOR) 80 MG tablet Take 1 tablet (80 mg total) by mouth daily. 30 tablet 1     cholecalciferol, vitamin D3, 1,000 unit tablet Take 1,000 Units by mouth daily.       cyanocobalamin 1000 MCG tablet Take 1,000 mcg by mouth daily.       hydrOXYzine (ATARAX) 25 MG tablet Take 1 tablet (25 mg total) by mouth every 6 (six) hours as needed for anxiety. 15 tablet 0     insulin aspart (NOVOLOG) 100 unit/mL injection Inject 35 Units under the skin 3 (three) times a day before meals.        insulin glargine (LANTUS) 100 unit/mL injection Inject 56 Units under the skin 2 (two) times a day.        liraglutide (VICTOZA) 0.6 mg/0.1 mL (18 mg/3 mL) PnIj injection Inject 1.8 mg under the skin daily.        lisinopril (PRINIVIL,ZESTRIL) 40 MG tablet Take 40 mg by mouth daily.       metFORMIN (GLUCOPHAGE) 1000 MG tablet Take 1 tablet (1,000 mg total) by mouth 2 (two) times a day with meals. 60 tablet 6     metoprolol succinate (TOPROL XL) 25  MG Take 1 tablet (25 mg total) by mouth daily. 30 tablet 0     minocycline (MINOCIN,DYNACIN) 100 MG capsule Take 100 mg by mouth 2 (two) times a day as needed.        potassium citrate (UROCIT-K) 5 mEq (540 mg) SR tablet Take 30 mEq by mouth 2 (two) times a day.        Study Drug pemafibrate 0.2 mg/placebo (PROMINENT) tablet Take 0.2 mg by mouth 2 (two) times a day.       syringe accessory Misc Inject 3 each as directed.       tamsulosin (FLOMAX) 0.4 mg Cp24 Take 1 capsule (0.4 mg total) by mouth daily. 14 capsule 0     Current Facility-Administered Medications   Medication Dose Route Frequency Provider Last Rate Last Dose     Study Drug pemafibrate 0.2 mg/placebo tablet 0.2 mg (PROMINENT)  0.2 mg Oral BID Presley Torres, RN         Study Drug pemafibrate 0.2 mg/placebo tablet 0.2 mg (PROMINENT)  0.2 mg Oral BID Presley Torres, RN           Allergies   Allergen Reactions     Gabapentin      Tolmetin Unknown     Pt is on Plavix       Objective:     Vitals:    03/01/19 0803   BP: 118/76   Pulse: 62   Resp: 16     Wt Readings from Last 3 Encounters:   03/01/19 (!) 244 lb (110.7 kg)   03/01/19 (!) 244 lb (110.7 kg)   02/05/19 (!) 240 lb (108.9 kg)       ROS:  Negative unless noted in HPI    General Appearance:   A & O X3, cooperative and in no acute distress.   HEENT:   No scleral icterus; the mucous membranes were pink and moist. Cervical lymph nodes nontender and nonpalpable.   Neck: JVP normal. No HJR. Thyroid symmetry with no mass or tenderness noted   Chest: The spine was straight. The chest was symmetric.   Lungs:   Respirations unlabored; the lungs are clear to auscultation.   Cardiovascular:   Regular rhythm. S1 and S2 without murmur, clicks or rubs. Radial, carotid and posterior tibial pulses are intact and symmetrical.  No carotid bruits noted   Abdomen:   Large but soft, nontender, nondistended, bowel sounds present   Extremities: No cyanosis, clubbing, or edema. Strength 5/5 bilateral UE & LE   Skin: No  bruising or wounds   Neurologic: Mood and affect are appropriate. No paresthesia noted         Cristofer Narayanan CNP  [facility]   Heart Failure Clinic

## 2021-06-24 NOTE — PATIENT INSTRUCTIONS - HE
It was great to see you again today for the Prominent triglyceride study.  We will call you in 2 months for a study telephone visit.    We will also see you back in 4 months for your Visit 15 which is a fasting visit with urine sample.  Please remember to bring back your study drug and packages (both empty or full) to every study visit.  Inform us of any changes in your health and call with any questions.  Thanks!    Research Hotline: [phone]  Presley Torres RN  Clinical Trials Nurse

## 2021-06-24 NOTE — TELEPHONE ENCOUNTER
Pemafibrate to Reduce cardiovascular OutcoMes by reducing triglycerides IN patiENts with diabeTes (PROMINENT) clinical trial.    I called Joe and relayed Dr. Barber's instruction to continue study drug and does not think kidney and esophageal issues are related to drug.  I also reinforced compliance on study drug due to low drug accountability rate.  Joe relayed he likely forgot his study drug when on vacation and will make better effort to take as instructed.  Will follow up with any other concerns.    Research Hotline: [phone]  Presley Torres RN  Clinical Trials Nurse

## 2021-06-24 NOTE — PROGRESS NOTES
"Pemafibrate to Reduce cardiovascular OutcoMes by reducing triglycerides IN patiENts with diabeTes (PROMINENT) clinical trial.    Subject seen in clinic today for study Visit 13.      Subject seen by provider Cristofer Narayanan for study related physical exam.     Vitals:    03/01/19 0805   BP: 118/76   Patient Site: Left Arm   Patient Position: Sitting   Cuff Size: Adult Regular   Pulse: 62   Resp: 16   Weight: (!) 244 lb (110.7 kg)   Height: 5' 11\" (1.803 m)     Waist measures 121cm    Labs drawn per protocol.  Results will be scanned into 'media'.     Study efficacy, endpoints and adverse events reviewed with subject.  Cardiovascular risk discussed.     Study alcohol consumption stipulations and education reviewed with subject:    Subject reports  [] Never [x] Current [] Former .   2 drinks per [] Day [] Week [x] Month [] Occasional.  2 maximum drinks per sitting.  Subject reports for special events he may drink more such as his recent February 10th trip to [place], [place] but otherwise averages 2 drinks a month due to his medications and diabetes.  He restarted smoking due to stressful events but has nicotine patches he will try (subject discussed this with Cristofer Narayanan as well).  We stressed the importance of cessation.    Study medication box # [id number] with 0 tabs  Study medication box # [id number] with 0 tabs  Study medication box # [id number] with 42 tabs  Study medication box # [id number] with 70 tabs  returned by subject.  Total tablet count of 112 for 71% compliance.  Reinforced study drug compliance and remembering pills with subject.  Study medication box #'s [id number], [id number], [id number], [id number] dispensed to subject.  Education provided on medication compliance and administration    Plan: Study Visit 14 telephone call with subject in 2 months.  Will schedule study Visit 15  with subject in 4 months back at [facility].    Presley Torres RN  Clinical Trials Nurse    I spoke at length with the subject today to " go over details on several recent medical events.  We requested medical records today to confirm this information as most of this was from subject recall:    1. Subject reported that during his ED visit on 01Dec2018 for presyncope, HTN, and back pain - a chest scan was done which found esophageal thickening and renal mass.  [facility] Nunez who then referred him to Dr. Goodson at [facility].  The subject had 2 studies done through [facility] with the most recent on Feb 8th and a follow up scheduled for Mar 11th.  The subject is not very clear on his prognosis from these studies.  We are awaiting results on if there is concern of esophageal cancer.  The subject also reported he has had trouble with swallowing and food moving slowly about 3 times a year for the past 4 years, but since November this year it has occurred monthly.     2. After the ED visit in December, the subject's PCP also referred [facility]. The subject said he was told he has a 3.5cm mass on his left kidney and was told it is cancerous but has not spread anywhere.  He does have significant history of Left kidney stones and surgery since 2012.  He was told he only has about 10% function in his left kidney but there are no issues with his right kidney.  He reported he is scheduled to [facility],   Likely 3 day admission with a 4 to 6 week recovery.    Dr. Barber: Please assign causality of ELISSA's below:  Also, would you like to hold study drug given subject's upcoming kidney removal and cancer diagnosis?    Adverse Event: 1. Serious adverse event   Esophageal thickening 2. Serious adverse event   Left renal carcinoma   Start Date: 01Dec2019 01Dec2019   End Date:    08jul2019 24mar2019   Severity (mild/moderate/severe): Severe  (per medically significant event) Severe  (per medically significant event)   Serious?  Yes  [x]     No  []  Yes  [x]      No  []     Relationship: Is there a reasonable possibility that the event may have been caused by Investigational Medicinal Product?    Yes  []     No  [x]      Yes  []     No  [x]       Action taken with study treatment:   []Drug Interrupted  []Drug Withdrawal  []Not Applicable   []Unknown              [x]No Action []Drug Interrupted  []Drug Withdrawal  []Not Applicable   []Unknown              [x]No Action         Outcome:  []Not Recovered/Not Resolved  [x]Recovered/Resolved  []Recovered/Resolved with Sequelae   []Recovering/Resolving  []Unknown   []Fatal []Not Recovered/Not Resolved  [x]Recovered/Resolved  []Recovered/Resolved with Sequelae   []Recovering/Resolving  []Unknown   []Fatal   Medication or therapies taken:  Yes  []     No  [x]  Yes  []     No  [x]

## 2021-06-25 NOTE — TELEPHONE ENCOUNTER
Spoke with Joe and discussed plan for study visit tomorrow at new site [facility].  Will go over consent updates, study ect.  No covid19 or symptoms screening.    Presley Torres RN

## 2021-06-26 NOTE — PROGRESS NOTES
Pemafibrate to Reduce cardiovascular OutcoMes by reducing triglycerides IN patiENts with diabeTes (PROMINENT) clinical trial.    Subject seen in clinic today for study Visit 27.      Re-consent process:   Research nurse met with subject to discuss updates for the above noted study.    The study discussion included review of the following:     Study purpose    Qualifications for participation    Length of study participation    Study procedures    Risks and side effects    Benefits (if any)    Voluntary nature of participation    Alternatives to participation    Confidentiality of records    Financial considerations     Location updates    Subject asked questions and agreed that he received answers that satisfied him.    Consent form (version 5.0  [id number], Approved 12/22/2020) signed.   HIPAA updated form 9.14.18  [facility] signed.  A signed copy was given to the subject & forwarded to medical records.    Subject seen by provider Julia Marie for study related physical exam.  See provider note and flow sheets for vital signs.    Waist measures 114.0 cm    EQ-5D-5L questionnaire completed.    Study efficacy, endpoints and adverse events reviewed with subject.  Cardiovascular risk discussed.  Review recent ELISSA's, AE's and follow up from his MI with PCI and ongoing events.  Reports he is doing pretty good today.    Study alcohol consumption stipulations and education reviewed with subject:    Subject reports  [] Never [x] Current [] Former, stop date.   1 drinks per [] Day [] Week [] Month [x] Occasional.  1 maximum drinks per sitting.     Study medication box # [id number] with 0 tabs  Study medication box # [id number] with 0 tabs  Study medication box # [id number] with 8 tabs  Study medication box # [id number] with 70 tabs  returned by subject.  Total tablet count of 78 for 89 % compliance.  Study medication box #'s [id number], [id number], [id number], [id number] dispensed to subject.  Education provided on medication compliance and  administration    Fasting labs and urine drawn today at 08:45am.    Plan: Study Visit 28 telephone call with subject in 2 months.  Will schedule study Visit 29 with subject in 4 months back in clinic.    Presley Torres RN  Clinical Trials Nurse

## 2021-06-26 NOTE — PATIENT INSTRUCTIONS - HE
Man Acevedo Sr.,    It was a pleasure to see you today at the [facility].     My recommendations after this visit include:    Follow up per research    Julia Marie, CNP  [facility], Electrophysiology  [phone]  EP nurses [phone]

## 2021-06-26 NOTE — PATIENT INSTRUCTIONS - HE
It was great to see you again today for the Prominent triglyceride study.  We will call you in 2 months for a study telephone visit.    We will also see you back in 4 months for your Visit 29.  Please remember to bring back your study drug and packages (both empty or full) to every study visit.  Inform us of any changes in your health and call with any questions.  Thanks!    Research Hotline: [phone]  Presley Torres RN  Clinical Trials Nurse

## 2021-06-28 NOTE — PROGRESS NOTES
Progress Notes by Julia Marie CNP at 3/2/2020  9:10 AM     Author: Julia Marie CNP Service: -- Author Type: Nurse Practitioner    Filed: 3/2/2020  9:42 AM Encounter Date: 3/2/2020 Status: Signed    : Julia Marie CNP (Nurse Practitioner)             Assessment/Recommendations   Assessment/Plan:    No changes to his medications today.  He was urged to quit smoking.  He will continue to follow-up with research per protocol for PROMINENT study.       History of Present Illness/Subjective    Man JOSEPH Acevedo Sr. is seen at [facility] [place] today for PROMINENT visit 19.  Pemafibrate to Reduce cardiovascular OutcoMes by reducing triglycerides IN patiENts with diabeTes (PROMINENT) clinical trial.  He has a history of coronary artery disease with previous MI and stents, hypertension, type 2 diabetes, and hyperlipidemia.  He had surgery for a left renal cancer in March 2019.  He also [facility] which [facility] states is unchanged, but he may undergo biopsy for further evaluation.  He has been fighting a cold for the past week and has some intercostal discomfort due to coughing.  He also started smoking again and was counseled regarding smoking cessation.  Because of this, he has some mild dyspnea on exertion.  He has no health concerns today and denies fatigue, lightheadedness, shortness of breath, dyspnea on exertion, orthopnea, PND, palpitations, chest pain, abdominal fullness/bloating and lower extremity edema.  He was switched from Victoza to Ozempic 1 week ago.       Physical Examination Review of Systems   Vitals:    03/02/20 0846   BP: 130/80   Pulse: 74   Resp: 16   SpO2: 94%     Body mass index is 33.35 kg/m .  Wt Readings from Last 3 Encounters:   03/02/20 (!) 239 lb (108.4 kg)   03/02/20 (!) 239 lb (108.4 kg)   11/04/19 (!) 242 lb (109.8 kg)       General Appearance:   Alert, cooperative and in no acute distress.   HEENT:  No scleral icterus, EOM intact,  PERRL; the mucous membranes were pink and moist. Cervical lymph nodes nontender and nonpalpable.   Neck: Supple.  JVP normal.  No HJR.  No obvious thyromegaly.     Chest: The spine was straight. The chest was symmetric.   Lungs:   Respirations unlabored; the lungs are clear to auscultation.   Cardiovascular:   Regular rhythm. S1 and S2 without murmur, clicks or rubs. Radial, carotid and posterior tibial pulses are intact and symmetrical.  No carotid bruits noted.   Abdomen:  Soft, nontender, nondistended, bowel sounds present   Extremities: No cyanosis, clubbing, or edema.   Musculoskeletal:  Moves all extremities.   Skin: No bruising or wounds.   Neurologic: Mood and affect are appropriate.    General: WNL  Eyes: WNL  Ears/Nose/Throat: WNL  Lungs: Cough, Wheezing  Heart: Shortness of Breath with activity  Stomach: Constipation, Diarrhea  Bladder: Frequent Urination at Night  Muscle/Joints: Muscle Weakness, Muscle Pain  Skin: WNL  Nervous System: Daytime Sleepiness  Mental Health: WNL     Blood: WNL       Medical History  Surgical History Family History Social History   Past Medical History:   Diagnosis Date   ? Coronary artery disease 24Jan2018   ? Diabetes mellitus (H) 01/2005    adult onset   ? GERD (gastroesophageal reflux disease)    ? High cholesterol    ? Hyperlipidemia 2008   ? Hypertension 2006   ? Kidney stones 2012   ? Liver disease    ? Myocardial infarction (H)    ? Neuropathy of foot 07/16/2014   ? Obesity    ? DEWAYNE on CPAP    ? Peripheral Neuropathy     Created by Conversion    ? Sleep apnea    ? Uncontrolled type 2 diabetes mellitus with other diabetic kidney complication, unspecified long term insulin use status     Created by Conversion     Past Surgical History:   Procedure Laterality Date   ? CARDIAC CATHETERIZATION  01/24/2017   ? CARPAL TUNNEL RELEASE Right 04/12/2016   ? CORONARY STENT PLACEMENT     ? KIDNEY STONE SURGERY Left 02/05/2018    stent placement, lithotripsy, nephrolithotomy   ? KNEE  "ARTHROSCOPY Left 8/6/15    Partial medial meniscectoy, [facility]   ? LEG SURGERY     ? Percutaneous Lithotomy  Left 07/11/2014   ? MN AMPUTATE METACARPAL+FINGER      Description: Metacarpal Amputation And Index Finger;  Recorded: 08/15/2014;  Comments: For \"cartilage cancer\"   ? MN CATH PLACEMENT & NJX CORONARY ART ANGIO IMG S&I N/A 1/24/2017    Procedure: Coronary Angiogram;  Surgeon: Melissa Freeman MD;  Location: [facility] Cath Lab;  Service: Cardiology   ? MN L HRT CATH W/NJX L VENTRICULOGRAPHY IMG S&I N/A 1/24/2017    Procedure: Left Heart Catheterization with Left Ventriculogram;  Surgeon: Melissa Freeman MD;  Location: [facility] Cath Lab;  Service: Cardiology   ? MN LAP,CHOLECYSTECTOMY      Description: Cholecystectomy Laparoscopic;  Proc Date: 12/11/2013;   ? MN PERCUT REMV KID STONE,UP TO 2 CM      Description: Percutaneous Lithotomy;  Proc Date: 11/05/2012;    History reviewed. No pertinent family history. Social History     Tobacco Use   ? Smoking status: Former Smoker     Packs/day: 0.50   ? Smokeless tobacco: Never Used   ? Tobacco comment: Patient reports he is trying to quit.   Substance Use Topics   ? Alcohol use: Yes     Frequency: 2-4 times a month     Drinks per session: 1 or 2     Binge frequency: Never   ? Drug use: No          Medications  Allergies   Current Outpatient Medications   Medication Sig Dispense Refill   ? aspirin 81 MG EC tablet Take 81 mg by mouth daily.     ? atorvastatin (LIPITOR) 80 MG tablet Take 1 tablet (80 mg total) by mouth daily. 30 tablet 1   ? cholecalciferol, vitamin D3, 1,000 unit tablet Take 1,000 Units by mouth daily.     ? cyanocobalamin 1000 MCG tablet Take 1,000 mcg by mouth daily.     ? hydrOXYzine (ATARAX) 25 MG tablet Take 1 tablet (25 mg total) by mouth every 6 (six) hours as needed for anxiety. 15 tablet 0   ? insulin aspart (NOVOLOG) 100 unit/mL injection Inject 35 Units under the skin 3 (three) times a day before " meals.      ? insulin glargine (LANTUS) 100 unit/mL injection Inject 56 Units under the skin 2 (two) times a day.      ? lisinopril (PRINIVIL,ZESTRIL) 40 MG tablet Take 40 mg by mouth daily.     ? metFORMIN (GLUCOPHAGE) 1000 MG tablet Take 1 tablet (1,000 mg total) by mouth 2 (two) times a day with meals. 60 tablet 6   ? metoprolol succinate (TOPROL XL) 25 MG Take 1 tablet (25 mg total) by mouth daily. 30 tablet 0   ? minocycline (MINOCIN,DYNACIN) 100 MG capsule Take 100 mg by mouth 2 (two) times a day as needed.      ? potassium citrate (UROCIT-K) 5 mEq (540 mg) SR tablet Take 30 mEq by mouth 2 (two) times a day.      ? semaglutide 0.25 mg or 0.5 mg(2 mg/1.5 mL) PnIj Inject 0.25 mg under the skin every 7 days.     ? Study Drug pemafibrate 0.2 mg/placebo (PROMINENT) tablet Take 0.2 mg by mouth 2 (two) times a day.     ? syringe accessory Misc Inject 3 each as directed.     ? tamsulosin (FLOMAX) 0.4 mg Cp24 Take 1 capsule (0.4 mg total) by mouth daily. 14 capsule 0     Current Facility-Administered Medications   Medication Dose Route Frequency Provider Last Rate Last Dose   ? Study Drug pemafibrate 0.2 mg/placebo tablet 0.2 mg (PROMINENT)  0.2 mg Oral BID Presley Torres RN       ? Study Drug pemafibrate 0.2 mg/placebo tablet 0.2 mg (PROMINENT)  0.2 mg Oral BID Presley Torres RN        Allergies   Allergen Reactions   ? Gabapentin    ? Tolmetin Unknown     Pt is on Plavix          This note has been dictated using voice recognition software. Any grammatical, typographical, or context distortions are unintentional and inherent to the software.

## 2021-06-28 NOTE — PROGRESS NOTES
Progress Notes by Julia Marie CNP at 11/4/2019 10:30 AM     Author: Julia Marie CNP Service: -- Author Type: Nurse Practitioner    Filed: 11/4/2019 11:10 AM Encounter Date: 11/4/2019 Status: Signed    : Julia Marie CNP (Nurse Practitioner)             Assessment/Recommendations   Assessment/Plan:    No changes to his medications today. He will continue to follow-up with research per protocol for PROMINENT study.       History of Present Illness/Subjective    Man Acevedo Sr. [facility] today for PROMINENT visit 17.  Pemafibrate to Reduce cardiovascular OutcoMes by reducing triglycerides IN patiENts with diabeTes (PROMINENT) clinical trial.  He has a history of coronary artery disease with previous MI and stents, hypertension, type 2 diabetes, and hyperlipidemia.  He had surgery for a left renal cancer in March 2019 since which time he has lost a little bit of weight.  He is trying to improve his diet and exercise.  He has no health concerns today and denies fatigue, lightheadedness, shortness of breath, dyspnea on exertion, orthopnea, PND, palpitations, chest pain, abdominal fullness/bloating and lower extremity edema.         Physical Examination Review of Systems   Vitals:    11/04/19 1009   BP: 112/64   Pulse: 72   Resp: 16     Body mass index is 33.75 kg/m .  Wt Readings from Last 3 Encounters:   11/04/19 (!) 242 lb (109.8 kg)   07/08/19 (!) 243 lb (110.2 kg)   07/08/19 (!) 243 lb (110.2 kg)       General Appearance:   Alert, cooperative and in no acute distress.   HEENT:  No scleral icterus, EOM intact, PERRL; the mucous membranes were pink and moist. Cervical lymph nodes nontender and nonpalpable.   Neck: Supple.  JVP normal.  No HJR.  No obvious thyromegaly.     Chest: The spine was straight. The chest was symmetric.   Lungs:   Respirations unlabored; the lungs are clear to auscultation.   Cardiovascular:   Regular rhythm. S1 and S2 without murmur, clicks or rubs.  "Radial, carotid and posterior tibial pulses are intact and symmetrical.  No carotid bruits noted.   Abdomen:  Soft, nontender, nondistended, bowel sounds present   Extremities: No cyanosis, clubbing, or edema.   Musculoskeletal:  Moves all extremities.   Skin: No bruising or wounds.   Neurologic: Mood and affect are appropriate.    General: WNL  Eyes: WNL  Ears/Nose/Throat: WNL  Lungs: Shortness of Breath, Snoring  Heart: Shortness of Breath with activity  Stomach: WNL  Bladder: WNL  Muscle/Joints: Muscle Weakness  Skin: WNL  Nervous System: Daytime Sleepiness  Mental Health: WNL     Blood: WNL       Medical History  Surgical History Family History Social History   Past Medical History:   Diagnosis Date   ? Coronary artery disease 24Jan2018   ? Diabetes mellitus (H) 01/2005    adult onset   ? GERD (gastroesophageal reflux disease)    ? High cholesterol    ? Hyperlipidemia 2008   ? Hypertension 2006   ? Kidney stones 2012   ? Liver disease    ? Myocardial infarction (H)    ? Neuropathy of foot 07/16/2014   ? Obesity    ? DEWAYNE on CPAP    ? Peripheral Neuropathy     Created by Conversion    ? Sleep apnea    ? Uncontrolled type 2 diabetes mellitus with other diabetic kidney complication, unspecified long term insulin use status     Created by Conversion     Past Surgical History:   Procedure Laterality Date   ? CARDIAC CATHETERIZATION  01/24/2017   ? CARPAL TUNNEL RELEASE Right 04/12/2016   ? CORONARY STENT PLACEMENT     ? KIDNEY STONE SURGERY Left 02/05/2018    stent placement, lithotripsy, nephrolithotomy   ? KNEE ARTHROSCOPY Left 8/6/15    Partial medial meniscectoy[facility]   ? LEG SURGERY     ? Percutaneous Lithotomy  Left 07/11/2014   ? NJ AMPUTATE METACARPAL+FINGER      Description: Metacarpal Amputation And Index Finger;  Recorded: 08/15/2014;  Comments: For \"cartilage cancer\"   ? NJ CATH PLACEMENT & NJX CORONARY ART ANGIO IMG S&I N/A 1/24/2017    Procedure: Coronary " Angiogram;  Surgeon: Melissa Freeman MD;  Location: [facility] Cath Lab;  Service: Cardiology   ? IN L HRT CATH W/NJX L VENTRICULOGRAPHY IMG S&I N/A 1/24/2017    Procedure: Left Heart Catheterization with Left Ventriculogram;  Surgeon: Melissa Freeman MD;  Location: [facility] Cath Lab;  Service: Cardiology   ? IN LAP,CHOLECYSTECTOMY      Description: Cholecystectomy Laparoscopic;  Proc Date: 12/11/2013;   ? IN PERCUT REMV KID STONE,UP TO 2 CM      Description: Percutaneous Lithotomy;  Proc Date: 11/05/2012;    History reviewed. No pertinent family history. Social History     Tobacco Use   ? Smoking status: Former Smoker     Packs/day: 0.50   ? Smokeless tobacco: Never Used   ? Tobacco comment: Patient reports he is trying to quit.   Substance Use Topics   ? Alcohol use: Yes     Frequency: 2-4 times a month     Drinks per session: 1 or 2     Binge frequency: Never   ? Drug use: No          Medications  Allergies   Current Outpatient Medications   Medication Sig Dispense Refill   ? aspirin 81 MG EC tablet Take 81 mg by mouth daily.     ? atorvastatin (LIPITOR) 80 MG tablet Take 1 tablet (80 mg total) by mouth daily. 30 tablet 1   ? cholecalciferol, vitamin D3, 1,000 unit tablet Take 1,000 Units by mouth daily.     ? cyanocobalamin 1000 MCG tablet Take 1,000 mcg by mouth daily.     ? hydrOXYzine (ATARAX) 25 MG tablet Take 1 tablet (25 mg total) by mouth every 6 (six) hours as needed for anxiety. 15 tablet 0   ? insulin aspart (NOVOLOG) 100 unit/mL injection Inject 35 Units under the skin 3 (three) times a day before meals.      ? insulin glargine (LANTUS) 100 unit/mL injection Inject 56 Units under the skin 2 (two) times a day.      ? liraglutide (VICTOZA) 0.6 mg/0.1 mL (18 mg/3 mL) PnIj injection Inject 1.8 mg under the skin daily.      ? lisinopril (PRINIVIL,ZESTRIL) 40 MG tablet Take 40 mg by mouth daily.     ? metFORMIN (GLUCOPHAGE) 1000 MG tablet Take 1 tablet (1,000 mg total) by mouth 2 (two) times a day with  meals. 60 tablet 6   ? metoprolol succinate (TOPROL XL) 25 MG Take 1 tablet (25 mg total) by mouth daily. 30 tablet 0   ? minocycline (MINOCIN,DYNACIN) 100 MG capsule Take 100 mg by mouth 2 (two) times a day as needed.      ? potassium citrate (UROCIT-K) 5 mEq (540 mg) SR tablet Take 30 mEq by mouth 2 (two) times a day.      ? Study Drug pemafibrate 0.2 mg/placebo (PROMINENT) tablet Take 0.2 mg by mouth 2 (two) times a day.     ? syringe accessory Misc Inject 3 each as directed.     ? tamsulosin (FLOMAX) 0.4 mg Cp24 Take 1 capsule (0.4 mg total) by mouth daily. 14 capsule 0     Current Facility-Administered Medications   Medication Dose Route Frequency Provider Last Rate Last Dose   ? Study Drug pemafibrate 0.2 mg/placebo tablet 0.2 mg (PROMINENT)  0.2 mg Oral BID Presley Torres, RN       ? Study Drug pemafibrate 0.2 mg/placebo tablet 0.2 mg (PROMINENT)  0.2 mg Oral BID Presley Torres, RN        Allergies   Allergen Reactions   ? Gabapentin    ? Tolmetin Unknown     Pt is on Plavix          This note has been dictated using voice recognition software. Any grammatical, typographical, or context distortions are unintentional and inherent to the software.

## 2021-06-29 NOTE — PROGRESS NOTES
Progress Notes by Yeny Barber MD at 8/17/2020 12:50 PM     Author: Yeny Barber MD Service: -- Author Type: Physician    Filed: 8/17/2020  1:21 PM Encounter Date: 8/17/2020 Status: Signed    : Yeny Barber MD (Physician)           [facility]  Heart Care Clinic Follow-up Note    Assessment & Plan        1. Coronary artery disease involving native coronary artery of native heart without angina pectoris  -angiography January 24, 2017 showed 30% left main lesion, mid left anterior descending 30% lesion followed by a 95% lesion that received a drug-coated stent, distal left anterior descending 20% lesion and second diagonal 70% lesion.  The ramus had a 50% lesion, circumflex had a proximal 40% lesion, and the right coronary artery had a proximal 20% and mid 20% lesions.  His stress test done in May 2017 looked normal so work on prevention.  Does have some increased shortness of breath and given this we will arrange for repeat nuclear stress test with exercise.  If ischemic, angiography.   2. NSTEMI (non-ST elevated myocardial infarction) (H) -this was of the anterior wall in 2017 and getting along well.   3. Class 1 obesity due to excess calories with serious comorbidity and body mass index (BMI) of 33.0 to 33.9 in adult -work on weight loss.   4. Obstructive sleep apnea -has CPAP but now has a significant other female and spends nights with her and does not always have his CPAP machine.  Would suggest getting another machine to help out with this during the days when he is not home.   5. Dyslipidemia -complains of muscle aches, suspect could be due to atorvastatin, he is on the prominent study, will see about holding atorvastatin for 2 weeks to see if symptoms improve.  We are blinded to results of lipids.   6. Type 2 diabetes mellitus with other specified complication, with long-term current use of insulin (H) -hemoglobin A1c elevated at 9.1, needs better control.   7. Benign essential  hypertension -under good control currently.   8.  Irritable bowel syndrome-defer to gastroenterology.    Plan  1.  14-day ACT monitor given palpitations.  2.  Exercise stress nuclear given shortness of breath.  3.  Speak to gastroenterologist concerning dietary restrictions given his irritable bowel syndrome.  4.  Dual sleep apnea machines to be utilized when he is not at home.  5.  Follow with me 1 year or sooner if needed.  6.  Weight loss.    Subjective  CC: 60-year-old white gentleman being seen in yearly follow-up today.  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  He tells me when he is busy helping her he does feel his heart skip beats and race for about 10 to 15 minutes.  He also mentions shortness of breath on heavier activity.  He has diarrhea and constipation now with irritable bowel syndrome as well as abdominal bloating.  There is no syncope, dizziness, PND, orthopnea, nausea, vomiting or peripheral edema.    Medications  Current Outpatient Medications   Medication Sig Note   ? aspirin 81 MG EC tablet Take 81 mg by mouth daily.    ? atorvastatin (LIPITOR) 80 MG tablet Take 1 tablet (80 mg total) by mouth daily.    ? cholecalciferol, vitamin D3, 1,000 unit tablet Take 1,000 Units by mouth daily.    ? cyanocobalamin 1000 MCG tablet Take 1,000 mcg by mouth daily.    ? hydrOXYzine (ATARAX) 25 MG tablet Take 1 tablet (25 mg total) by mouth every 6 (six) hours as needed for anxiety.    ? insulin aspart (NOVOLOG) 100 unit/mL injection Inject 35 Units under the skin 3 (three) times a day before meals.  2/6/2017: Reports he takes 35 U ; 3 x day, with meals   ? insulin glargine (LANTUS) 100 unit/mL injection Inject 56 Units under the skin 2 (two) times a day.     ? lisinopril (PRINIVIL,ZESTRIL) 40 MG tablet Take 40 mg by mouth daily.    ? metFORMIN (GLUCOPHAGE) 1000 MG tablet Take 1 tablet (1,000 mg total) by mouth 2 (two) times a day  "with meals.    ? metoprolol succinate (TOPROL XL) 25 MG Take 1 tablet (25 mg total) by mouth daily.    ? minocycline (MINOCIN,DYNACIN) 100 MG capsule Take 100 mg by mouth 2 (two) times a day as needed.     ? potassium citrate (UROCIT-K) 5 mEq (540 mg) SR tablet Take 30 mEq by mouth 2 (two) times a day.     ? semaglutide 0.25 mg or 0.5 mg(2 mg/1.5 mL) PnIj Inject 0.25 mg under the skin every 7 days.    ? Study Drug pemafibrate 0.2 mg/placebo (PROMINENT) tablet Take 0.2 mg by mouth 2 (two) times a day.    ? syringe accessory Misc Inject 3 each as directed.    ? tamsulosin (FLOMAX) 0.4 mg Cp24 Take 1 capsule (0.4 mg total) by mouth daily.        Objective  /60 (Patient Site: Left Arm, Patient Position: Sitting, Cuff Size: Adult Large)   Pulse (!) 50   Resp 10   Ht 5' 11\" (1.803 m)   Wt (!) 239 lb (108.4 kg)   BMI 33.33 kg/m      General Appearance:    Alert, cooperative, no distress, appears stated age   Head:    Normocephalic, without obvious abnormality, atraumatic   Throat:   Lips, mucosa, and tongue normal; teeth and gums normal   Neck:   Supple, symmetrical, trachea midline, no adenopathy;        thyroid:  No enlargement/tenderness/nodules; no carotid    bruit or JVD   Back:     Symmetric, no curvature, ROM normal, no CVA tenderness   Lungs:     Clear to auscultation bilaterally, respirations unlabored   Chest wall:    No tenderness or deformity   Heart:    Regular rate and rhythm, S1 and S2 normal, no murmur, rub   or gallop   Abdomen:     Soft, non-tender, bowel sounds active all four quadrants,     no masses, no organomegaly   Extremities:   Normal, atraumatic, no cyanosis or edema   Pulses:   2+ and symmetric all extremities   Skin:   Skin color, texture, turgor normal, no rashes or lesions     Results    Lab Results personally reviewed   Lab Results   Component Value Date    CHOL 140 01/25/2017    CHOL 149 01/24/2017     Lab Results   Component Value Date    HDL 22 (L) 01/25/2017    HDL 26 (L) " 01/24/2017     Lab Results   Component Value Date    LDLCALC 64 01/25/2017    LDLCALC 57 01/24/2017     Lab Results   Component Value Date    TRIG 269 (H) 01/25/2017    TRIG 330 (H) 01/24/2017     Lab Results   Component Value Date    WBC 7.1 12/01/2018    HGB 14.9 12/01/2018    HCT 42.4 12/01/2018     (L) 12/01/2018     Lab Results   Component Value Date    CREATININE 1.01 12/01/2018    BUN 18 12/01/2018     12/01/2018    K 3.5 12/01/2018    CO2 24 12/01/2018     Review of Systems:   General: WNL  Eyes: WNL  Ears/Nose/Throat: WNL  Lungs: Snoring  Heart: Shortness of Breath with activity, Irregular Heartbeat  Stomach: Constipation, Diarrhea  Bladder: Frequent Urination at Night  Muscle/Joints: Joint Pain, Muscle Weakness, Muscle Pain  Skin: WNL  Nervous System: Daytime Sleepiness  Mental Health: WNL     Blood: WNL

## 2021-06-29 NOTE — PROGRESS NOTES
Progress Notes by Yvonne Florian CNP at 10/29/2020 10:30 AM     Author: Yvonne Florian CNP Service: -- Author Type: Nurse Practitioner    Filed: 10/29/2020 11:18 AM Encounter Date: 10/29/2020 Status: Signed    : Yvonne Florian CNP (Nurse Practitioner)             Assessment/Recommendations    He will continue to follow-up with research per protocol for [facility] study.    I will discuss symptomatic atrial fibrillation with Dr. Barber. I encouraged him to wear CPAP every night.        History of Present Illness/Subjective    Man JOSEPH Acevedo Sr. is seen at [facility] for [facility] research study.  He has a history of hypertension, coronary artery disease, dyslipidemia, obstructive sleep apnea, and diabetes.  Stress test in August 2020 was negative for ischemia and showed ejection 58%. JULIO CESAR monitor in August 2020 showed paroxysmal atrial fibrillation.  Dr. Barber started Eliquis.  He is symptomatic with atrial fibrillation having palpitations, shortness of breath, dizziness, and nausea.  His last episode was 1 week ago.  He denies chest pain and lower extremity edema.      Patient Active Problem List   Diagnosis   ? Peripheral Neuropathy   ? Nonalcoholic Fatty Liver Disease   ? Lower Back Pain   ? Adjustment Disorder With Anxiety And Depressed Mood   ? Neck Pain   ? Dyslipidemia   ? Pain in joint, ankle and foot   ? Obstructive sleep apnea   ? Right carpal tunnel syndrome   ? Left hand weakness   ? Tennis elbow   ? Benign essential hypertension   ? Obesity due to excess calories   ? Coronary artery disease involving native coronary artery of native heart without angina pectoris   ? NSTEMI (non-ST elevated myocardial infarction) (H)   ? Tobacco use   ? DM (diabetes mellitus) (H)   ? Hypertriglyceridemia   ? Paroxysmal atrial fibrillation (H)       Past Medical History:   Diagnosis Date   ? Coronary artery disease 24Jan2018   ? Diabetes mellitus (H) 01/2005    adult onset   ? GERD  "(gastroesophageal reflux disease)    ? High cholesterol    ? Hyperlipidemia 2008   ? Hypertension 2006   ? Kidney stones 2012   ? Liver disease    ? Myocardial infarction (H)    ? Neuropathy of foot 07/16/2014   ? Obesity    ? DEWAYNE on CPAP    ? Peripheral Neuropathy     Created by Conversion    ? Sleep apnea    ? Uncontrolled type 2 diabetes mellitus with other diabetic kidney complication, unspecified long term insulin use status     Created by Conversion        Past Surgical History:   Procedure Laterality Date   ? CARDIAC CATHETERIZATION  01/24/2017   ? CARPAL TUNNEL RELEASE Right 04/12/2016   ? CORONARY STENT PLACEMENT     ? KIDNEY STONE SURGERY Left 02/05/2018    stent placement, lithotripsy, nephrolithotomy   ? KNEE ARTHROSCOPY Left 8/6/15    [facility]iscectoy[facility]   ? LEG SURGERY     ? Percutaneous Lithotomy  Left 07/11/2014   ? AR AMPUTATE METACARPAL+FINGER      Description: Metacarpal Amputation And Index Finger;  Recorded: 08/15/2014;  Comments: For \"cartilage cancer\"   ? AR CATH PLACEMENT & NJX CORONARY ART ANGIO IMG S&I N/A 1/24/2017    Procedure: Coronary Angiogram;  Surgeon: Melissa Freeman MD;  Location: [facility] Cath Lab;  Service: Cardiology   ? AR L HRT CATH W/NJX L VENTRICULOGRAPHY IMG S&I N/A 1/24/2017    Procedure: Left Heart Catheterization with Left Ventriculogram;  Surgeon: Melissa Freeman MD;  Location: [facility] Cath Lab;  Service: Cardiology   ? AR LAP,CHOLECYSTECTOMY      Description: Cholecystectomy Laparoscopic;  Proc Date: 12/11/2013;   ? AR PERCUT REMV KID STONE,UP TO 2 CM      Description: Percutaneous Lithotomy;  Proc Date: 11/05/2012;       No family history on file.    Social History     Socioeconomic History   ? Marital status:      Spouse name: Not on file   ? Number of children: Not on file   ? Years of education: 13   ? Highest education level: Not on file   Occupational History   ? Not on file   Social Needs   ? " Financial resource strain: Not on file   ? Food insecurity     Worry: Not on file     Inability: Not on file   ? Transportation needs     Medical: Not on file     Non-medical: Not on file   Tobacco Use   ? Smoking status: Former Smoker     Packs/day: 0.50   ? Smokeless tobacco: Never Used   ? Tobacco comment: Patient reports he is trying to quit.   Substance and Sexual Activity   ? Alcohol use: Yes     Frequency: 2-4 times a month     Drinks per session: 1 or 2     Binge frequency: Never   ? Drug use: No   ? Sexual activity: Not on file   Lifestyle   ? Physical activity     Days per week: Not on file     Minutes per session: Not on file   ? Stress: Not on file   Relationships   ? Social connections     Talks on phone: Not on file     Gets together: Not on file     Attends [employer] service: Not on file     Active member of club or organization: Not on file     Attends meetings of clubs or organizations: Not on file     Relationship status: Not on file   ? Intimate partner violence     Fear of current or ex partner: Not on file     Emotionally abused: Not on file     Physically abused: Not on file     Forced sexual activity: Not on file   Other Topics Concern   ? Not on file   Social History Narrative    Wife of 21 years passed away on 10/15/2017. Had 8 kids with her - 3 of hers from a previous marriage, 3 of his from a previous marriage, and 2 kids they adopted together. He has 20 grandchildren.        Current Outpatient Medications   Medication Sig Dispense Refill   ? albuterol sulfate 90 mcg/actuation AePB Inhale 2 puffs as needed.     ? apixaban ANTICOAGULANT (ELIQUIS) 5 mg Tab tablet Take 1 tablet (5 mg total) by mouth 2 (two) times a day. 60 tablet 11   ? aspirin 81 MG EC tablet Take 81 mg by mouth daily.     ? atorvastatin (LIPITOR) 80 MG tablet Take 1 tablet (80 mg total) by mouth daily. 30 tablet 1   ? cholecalciferol, vitamin D3, 1,000 unit tablet Take 1,000 Units by mouth daily.     ? cyanocobalamin 1000  MCG tablet Take 1,000 mcg by mouth daily.     ? hydrOXYzine (ATARAX) 25 MG tablet Take 1 tablet (25 mg total) by mouth every 6 (six) hours as needed for anxiety. 15 tablet 0   ? insulin aspart (NOVOLOG) 100 unit/mL injection Inject 35 Units under the skin 3 (three) times a day before meals.      ? insulin glargine (LANTUS) 100 unit/mL injection Inject 56 Units under the skin 2 (two) times a day.      ? lisinopril (PRINIVIL,ZESTRIL) 40 MG tablet Take 40 mg by mouth daily.     ? loperamide (IMODIUM) 2 mg capsule Take 2 mg by mouth as needed.     ? metFORMIN (GLUCOPHAGE) 1000 MG tablet Take 1 tablet (1,000 mg total) by mouth 2 (two) times a day with meals. 60 tablet 6   ? metoprolol succinate (TOPROL XL) 25 MG Take 1 tablet (25 mg total) by mouth daily. 30 tablet 0   ? minocycline (MINOCIN,DYNACIN) 100 MG capsule Take 100 mg by mouth 2 (two) times a day as needed.      ? omeprazole (PRILOSEC) 20 MG capsule Take 20 mg by mouth.     ? potassium citrate (UROCIT-K) 5 mEq (540 mg) SR tablet Take 30 mEq by mouth 2 (two) times a day.      ? semaglutide 0.25 mg or 0.5 mg(2 mg/1.5 mL) PnIj Inject 0.25 mg under the skin every 7 days.     ? Study Drug pemafibrate 0.2 mg/placebo (PROMINENT) tablet Take 0.2 mg by mouth 2 (two) times a day.     ? syringe accessory Misc Inject 3 each as directed.     ? tamsulosin (FLOMAX) 0.4 mg Cp24 Take 1 capsule (0.4 mg total) by mouth daily. 14 capsule 0   ? testosterone cypionate (DEPOTESTOTERONE CYPIONATE) 200 mg/mL injection Inject 200 mg into the shoulder, thigh, or buttocks.       Current Facility-Administered Medications   Medication Dose Route Frequency Provider Last Rate Last Dose   ? Study Drug pemafibrate 0.2 mg/placebo tablet 0.2 mg (PROMINENT)  0.2 mg Oral BID Presley Torres RN           Allergies   Allergen Reactions   ? Gabapentin    ? Tolmetin Unknown     Pt is on Plavix        Physical Examination Review of Systems   Vitals:    10/29/20 1025   BP: 110/54   Pulse: (!) 48   Resp:  18   SpO2: 96%     Body mass index is 33.89 kg/m .  Wt Readings from Last 3 Encounters:   10/29/20 (!) 243 lb (110.2 kg)   10/29/20 (!) 243 lb (110.2 kg)   08/17/20 (!) 239 lb (108.4 kg)       General Appearance:     Alert, cooperative and in no acute distress.   ENT/Mouth: membranes moist, no oral lesions or bleeding gums.      EYES:  no scleral icterus, normal conjunctivae   Neck: no carotid bruits or thyromegaly   Chest/Lungs:   lungs are clear to auscultation, no rales or wheezing, respirations unlabored   Cardiovascular:   Regular. Normal first and second heart sounds, no edema bilateral lower extremities    Abdomen:  Soft, nontender, nondistended, bowel sounds present   Extremities: no cyanosis or clubbing   Skin: warm, dry.    Neurologic: mood and affect are appropriate, alert and oriented x3      General: WNL  Eyes: WNL  Ears/Nose/Throat: WNL  Lungs: Snoring  Heart: Irregular Heartbeat  Stomach: Constipation, Diarrhea  Bladder: Frequent Urination at Night  Muscle/Joints: Joint Pain, Muscle Weakness  Skin: WNL  Nervous System: Daytime Sleepiness  Mental Health: WNL     Blood: WNL         Medical History  Surgical History Family History Social History   Past Medical History:   Diagnosis Date   ? Coronary artery disease 24Jan2018   ? Diabetes mellitus (H) 01/2005    adult onset   ? GERD (gastroesophageal reflux disease)    ? High cholesterol    ? Hyperlipidemia 2008   ? Hypertension 2006   ? Kidney stones 2012   ? Liver disease    ? Myocardial infarction (H)    ? Neuropathy of foot 07/16/2014   ? Obesity    ? DEWAYNE on CPAP    ? Peripheral Neuropathy     Created by Conversion    ? Sleep apnea    ? Uncontrolled type 2 diabetes mellitus with other diabetic kidney complication, unspecified long term insulin use status     Created by Conversion     Past Surgical History:   Procedure Laterality Date   ? CARDIAC CATHETERIZATION  01/24/2017   ? CARPAL TUNNEL RELEASE Right 04/12/2016   ? CORONARY STENT PLACEMENT     ?  "KIDNEY STONE SURGERY Left 02/05/2018    stent placement, lithotripsy, nephrolithotomy   ? KNEE ARTHROSCOPY Left 8/6/15    Partial medial meniscectoy[facility]   ? LEG SURGERY     ? Percutaneous Lithotomy  Left 07/11/2014   ? AL AMPUTATE METACARPAL+FINGER      Description: Metacarpal Amputation And Index Finger;  Recorded: 08/15/2014;  Comments: For \"cartilage cancer\"   ? AL CATH PLACEMENT & NJX CORONARY ART ANGIO IMG S&I N/A 1/24/2017    Procedure: Coronary Angiogram;  Surgeon: Melissa Freeman MD;  Location: [facility] Cath Lab;  Service: Cardiology   ? AL L HRT CATH W/NJX L VENTRICULOGRAPHY IMG S&I N/A 1/24/2017    Procedure: Left Heart Catheterization with Left Ventriculogram;  Surgeon: Melissa Freeman MD;  Location: [facility] Cath Lab;  Service: Cardiology   ? AL LAP,CHOLECYSTECTOMY      Description: Cholecystectomy Laparoscopic;  Proc Date: 12/11/2013;   ? AL PERCUT REMV KID STONE,UP TO 2 CM      Description: Percutaneous Lithotomy;  Proc Date: 11/05/2012;    No family history on file. Social History     Socioeconomic History   ? Marital status:      Spouse name: Not on file   ? Number of children: Not on file   ? Years of education: 13   ? Highest education level: Not on file   Occupational History   ? Not on file   Social Needs   ? Financial resource strain: Not on file   ? Food insecurity     Worry: Not on file     Inability: Not on file   ? Transportation needs     Medical: Not on file     Non-medical: Not on file   Tobacco Use   ? Smoking status: Former Smoker     Packs/day: 0.50   ? Smokeless tobacco: Never Used   ? Tobacco comment: Patient reports he is trying to quit.   Substance and Sexual Activity   ? Alcohol use: Yes     Frequency: 2-4 times a month     Drinks per session: 1 or 2     Binge frequency: Never   ? Drug use: No   ? Sexual activity: Not on file   Lifestyle   ? Physical activity     Days per week: Not on file     Minutes per session: Not on file "   ? Stress: Not on file   Relationships   ? Social connections     Talks on phone: Not on file     Gets together: Not on file     Attends [employer] service: Not on file     Active member of club or organization: Not on file     Attends meetings of clubs or organizations: Not on file     Relationship status: Not on file   ? Intimate partner violence     Fear of current or ex partner: Not on file     Emotionally abused: Not on file     Physically abused: Not on file     Forced sexual activity: Not on file   Other Topics Concern   ? Not on file   Social History Narrative    Wife of 21 years passed away on 10/15/2017. Had 8 kids with her - 3 of hers from a previous marriage, 3 of his from a previous marriage, and 2 kids they adopted together. He has 20 grandchildren.           Medications  Allergies   Current Outpatient Medications   Medication Sig Dispense Refill   ? albuterol sulfate 90 mcg/actuation AePB Inhale 2 puffs as needed.     ? apixaban ANTICOAGULANT (ELIQUIS) 5 mg Tab tablet Take 1 tablet (5 mg total) by mouth 2 (two) times a day. 60 tablet 11   ? aspirin 81 MG EC tablet Take 81 mg by mouth daily.     ? atorvastatin (LIPITOR) 80 MG tablet Take 1 tablet (80 mg total) by mouth daily. 30 tablet 1   ? cholecalciferol, vitamin D3, 1,000 unit tablet Take 1,000 Units by mouth daily.     ? cyanocobalamin 1000 MCG tablet Take 1,000 mcg by mouth daily.     ? hydrOXYzine (ATARAX) 25 MG tablet Take 1 tablet (25 mg total) by mouth every 6 (six) hours as needed for anxiety. 15 tablet 0   ? insulin aspart (NOVOLOG) 100 unit/mL injection Inject 35 Units under the skin 3 (three) times a day before meals.      ? insulin glargine (LANTUS) 100 unit/mL injection Inject 56 Units under the skin 2 (two) times a day.      ? lisinopril (PRINIVIL,ZESTRIL) 40 MG tablet Take 40 mg by mouth daily.     ? loperamide (IMODIUM) 2 mg capsule Take 2 mg by mouth as needed.     ? metFORMIN (GLUCOPHAGE) 1000 MG tablet Take 1 tablet (1,000 mg  total) by mouth 2 (two) times a day with meals. 60 tablet 6   ? metoprolol succinate (TOPROL XL) 25 MG Take 1 tablet (25 mg total) by mouth daily. 30 tablet 0   ? minocycline (MINOCIN,DYNACIN) 100 MG capsule Take 100 mg by mouth 2 (two) times a day as needed.      ? omeprazole (PRILOSEC) 20 MG capsule Take 20 mg by mouth.     ? potassium citrate (UROCIT-K) 5 mEq (540 mg) SR tablet Take 30 mEq by mouth 2 (two) times a day.      ? semaglutide 0.25 mg or 0.5 mg(2 mg/1.5 mL) PnIj Inject 0.25 mg under the skin every 7 days.     ? Study Drug pemafibrate 0.2 mg/placebo (PROMINENT) tablet Take 0.2 mg by mouth 2 (two) times a day.     ? syringe accessory Misc Inject 3 each as directed.     ? tamsulosin (FLOMAX) 0.4 mg Cp24 Take 1 capsule (0.4 mg total) by mouth daily. 14 capsule 0   ? testosterone cypionate (DEPOTESTOTERONE CYPIONATE) 200 mg/mL injection Inject 200 mg into the shoulder, thigh, or buttocks.       Current Facility-Administered Medications   Medication Dose Route Frequency Provider Last Rate Last Dose   ? Study Drug pemafibrate 0.2 mg/placebo tablet 0.2 mg (PROMINENT)  0.2 mg Oral BID Presley Torres RN        Allergies   Allergen Reactions   ? Gabapentin    ? Tolmetin Unknown     Pt is on Plavix         Lab Results    Chemistry/lipid CBC Cardiac Enzymes/BNP/TSH/INR   Lab Results   Component Value Date    CHOL 140 01/25/2017    HDL 22 (L) 01/25/2017    LDLCALC 64 01/25/2017    TRIG 269 (H) 01/25/2017    CREATININE 1.01 12/01/2018    BUN 18 12/01/2018    K 3.5 12/01/2018     12/01/2018     (H) 12/01/2018    CO2 24 12/01/2018    Lab Results   Component Value Date    WBC 7.1 12/01/2018    HGB 14.9 12/01/2018    HCT 42.4 12/01/2018    MCV 94 12/01/2018     (L) 12/01/2018    Lab Results   Component Value Date    TROPONINI <0.01 12/01/2018    TSH 1.74 07/16/2014    INR 1.06 01/24/2017

## 2021-06-29 NOTE — PROGRESS NOTES
"Progress Notes by Cristofer Narayanan NP at 7/9/2020  1:30 PM     Author: Cristofer Narayanan NP Service: -- Author Type: Nurse Practitioner    Filed: 7/9/2020  1:47 PM Encounter Date: 7/9/2020 Status: Signed    : Cristofer Narayanan NP (Nurse Practitioner)       The patient has been notified of following:     \"This telephone visit will be conducted via a call between you and your physician/provider. We have found that certain health care needs can be provided without the need for a physical exam.  This service lets us provide the care you need with a phone conversation.  If a prescription is necessary we can send it directly to your pharmacy.  If lab work is needed we can place an order for that and you can then stop by our lab to have the test done at a later time. If during the course of the call the physician/provider feels a telephone visit is not appropriate, you will not be charged for this service.\" Verbal consent has been obtained for this service by care team member:       HEART CARE PHONE ENCOUNTER        The patient has chosen to have the visit conducted as a telephone visit, to reduce risk of exposure given the current status of Coronavirus in our community. This telephone visit is being conducted via a call between the patient and physician/provider. Health care needs are being provided without a physical exam. Video visit declined.     Assessment/Plan:     No changes to his medications today. He will continue to follow-up with research per protocol for  Pemafibrate to Reduce cardiovascular OutcoMes by reducing triglycerides IN patiENts with diabeTes (PROMINENT) clinical trial study.    1.  Hypertriglyceridemia: On prominent study    2.  Coronary artery disease: Denies chest pain.  On aspirin and atorvastatin.    Lost about 18 lbs in February. Walks for 15-20 twice a week and other chores involve physical exertion.    3. Tobacco abuse: Smoking down from 1 and 1/2 down to 1/2 pack a day.    4.  Hypertension: " Blood pressure stable today.  On lisinopril and metoprolol.  Most recent BMP in June 2020 showed BMP K+ 4.1, Cr 1.2 and mg 2.1-external lab result.  On potassium supplement    5.  Diabetes mellitus: On insulin therapy.    Follow up with Dr. Barber in 4-6 weeks     Total time of call between patient and provider was 9  minutes     Start Time:1326   Stop Time:1335    Subjective:   Man Acevedo Sr. is a 58 years old who is being evaluated via a billable telephone visit.     Man Acevedo Sr has a significant PMH of coronary artery disease with non-STEMI and stent placement 2 years ago, hypertension, diabetes type 2, obesity, DEWAYNE with CPAP use, peripheral neuropathy, nonalcoholic fatty liver disease, dyslipidemia, and  renal carcinoma with status post removal of cancer in the left kidney.    Did virtual visit with patient for permanent study follow-up.  Today, team denies having any chest pain, shortness of breath, PND, orthopnea, lower extremity edema or any bleeding complications.  He occasionally gets little lightheaded when he is out working in the hot and humid weather.  He has cut back on his smoking from 1 and half pack a day to half pack a day.  He is walking 15 to 20 minutes twice a week and also stays physically active and other chores that involve heavy physical exertion.  He denies any cardiac symptoms.  He has managed to lose another 18 pounds since February 2020.      Patient Active Problem List   Diagnosis   ? Peripheral Neuropathy   ? Nonalcoholic Fatty Liver Disease   ? Lower Back Pain   ? Adjustment Disorder With Anxiety And Depressed Mood   ? Neck Pain   ? Dyslipidemia   ? Pain in joint, ankle and foot   ? Obstructive sleep apnea   ? Right carpal tunnel syndrome   ? Left hand weakness   ? Tennis elbow   ? Benign essential hypertension   ? Obesity due to excess calories   ? Coronary artery disease due to lipid rich plaque   ? NSTEMI (non-ST elevated myocardial infarction) (H)   ? Tobacco use   ?  "DM (diabetes mellitus) (H)     Past Medical History:   Diagnosis Date   ? Coronary artery disease 24Jan2018   ? Diabetes mellitus (H) 01/2005    adult onset   ? GERD (gastroesophageal reflux disease)    ? High cholesterol    ? Hyperlipidemia 2008   ? Hypertension 2006   ? Kidney stones 2012   ? Liver disease    ? Myocardial infarction (H)    ? Neuropathy of foot 07/16/2014   ? Obesity    ? DEWAYNE on CPAP    ? Peripheral Neuropathy     Created by Conversion    ? Sleep apnea    ? Uncontrolled type 2 diabetes mellitus with other diabetic kidney complication, unspecified long term insulin use status     Created by Conversion      Past Surgical History:   Procedure Laterality Date   ? CARDIAC CATHETERIZATION  01/24/2017   ? CARPAL TUNNEL RELEASE Right 04/12/2016   ? CORONARY STENT PLACEMENT     ? KIDNEY STONE SURGERY Left 02/05/2018    stent placement, lithotripsy, nephrolithotomy   ? KNEE ARTHROSCOPY Left 8/6/15    Partial medial meniscectoy[facility]   ? LEG SURGERY     ? Percutaneous Lithotomy  Left 07/11/2014   ? MI AMPUTATE METACARPAL+FINGER      Description: Metacarpal Amputation And Index Finger;  Recorded: 08/15/2014;  Comments: For \"cartilage cancer\"   ? MI CATH PLACEMENT & NJX CORONARY ART ANGIO IMG S&I N/A 1/24/2017    Procedure: Coronary Angiogram;  Surgeon: Melissa Freeman MD;  Location: [facility] Cath Lab;  Service: Cardiology   ? MI L HRT CATH W/NJX L VENTRICULOGRAPHY IMG S&I N/A 1/24/2017    Procedure: Left Heart Catheterization with Left Ventriculogram;  Surgeon: Melissa Freeman MD;  Location: [facility] Cath Lab;  Service: Cardiology   ? MI LAP,CHOLECYSTECTOMY      Description: Cholecystectomy Laparoscopic;  Proc Date: 12/11/2013;   ? MI PERCUT REMV KID STONE,UP TO 2 CM      Description: Percutaneous Lithotomy;  Proc Date: 11/05/2012;     No family history on file.    Social History     Socioeconomic History   ? Marital status:      Spouse name: Not on file "   ? Number of children: Not on file   ? Years of education: 13   ? Highest education level: Not on file   Occupational History   ? Not on file   Social Needs   ? Financial resource strain: Not on file   ? Food insecurity     Worry: Not on file     Inability: Not on file   ? Transportation needs     Medical: Not on file     Non-medical: Not on file   Tobacco Use   ? Smoking status: Former Smoker     Packs/day: 0.50   ? Smokeless tobacco: Never Used   ? Tobacco comment: Patient reports he is trying to quit.   Substance and Sexual Activity   ? Alcohol use: Yes     Frequency: 2-4 times a month     Drinks per session: 1 or 2     Binge frequency: Never   ? Drug use: No   ? Sexual activity: Not on file   Lifestyle   ? Physical activity     Days per week: Not on file     Minutes per session: Not on file   ? Stress: Not on file   Relationships   ? Social connections     Talks on phone: Not on file     Gets together: Not on file     Attends [employer] service: Not on file     Active member of club or organization: Not on file     Attends meetings of clubs or organizations: Not on file     Relationship status: Not on file   ? Intimate partner violence     Fear of current or ex partner: Not on file     Emotionally abused: Not on file     Physically abused: Not on file     Forced sexual activity: Not on file   Other Topics Concern   ? Not on file   Social History Narrative    Wife of 21 years passed away on 10/15/2017. Had 8 kids with her - 3 of hers from a previous marriage, 3 of his from a previous marriage, and 2 kids they adopted together. He has 20 grandchildren.      Current Outpatient Medications   Medication Sig Dispense Refill   ? aspirin 81 MG EC tablet Take 81 mg by mouth daily.     ? atorvastatin (LIPITOR) 80 MG tablet Take 1 tablet (80 mg total) by mouth daily. 30 tablet 1   ? cholecalciferol, vitamin D3, 1,000 unit tablet Take 1,000 Units by mouth daily.     ? cyanocobalamin 1000 MCG tablet Take 1,000 mcg by mouth  daily.     ? hydrOXYzine (ATARAX) 25 MG tablet Take 1 tablet (25 mg total) by mouth every 6 (six) hours as needed for anxiety. 15 tablet 0   ? insulin aspart (NOVOLOG) 100 unit/mL injection Inject 35 Units under the skin 3 (three) times a day before meals.      ? insulin glargine (LANTUS) 100 unit/mL injection Inject 56 Units under the skin 2 (two) times a day.      ? lisinopril (PRINIVIL,ZESTRIL) 40 MG tablet Take 40 mg by mouth daily.     ? metFORMIN (GLUCOPHAGE) 1000 MG tablet Take 1 tablet (1,000 mg total) by mouth 2 (two) times a day with meals. 60 tablet 6   ? metoprolol succinate (TOPROL XL) 25 MG Take 1 tablet (25 mg total) by mouth daily. 30 tablet 0   ? minocycline (MINOCIN,DYNACIN) 100 MG capsule Take 100 mg by mouth 2 (two) times a day as needed.      ? potassium citrate (UROCIT-K) 5 mEq (540 mg) SR tablet Take 30 mEq by mouth 2 (two) times a day.      ? semaglutide 0.25 mg or 0.5 mg(2 mg/1.5 mL) PnIj Inject 0.25 mg under the skin every 7 days.     ? Study Drug pemafibrate 0.2 mg/placebo (PROMINENT) tablet Take 0.2 mg by mouth 2 (two) times a day.     ? syringe accessory Misc Inject 3 each as directed.     ? tamsulosin (FLOMAX) 0.4 mg Cp24 Take 1 capsule (0.4 mg total) by mouth daily. 14 capsule 0     Current Facility-Administered Medications   Medication Dose Route Frequency Provider Last Rate Last Dose   ? Study Drug pemafibrate 0.2 mg/placebo tablet 0.2 mg (PROMINENT)  0.2 mg Oral BID Presley Torres, RN       ? Study Drug pemafibrate 0.2 mg/placebo tablet 0.2 mg (PROMINENT)  0.2 mg Oral BID Presley Torres, RN         Allergies   Allergen Reactions   ? Gabapentin    ? Tolmetin Unknown     Pt is on Plavix     Physical Examination not performed for this encounter given phone visit     This note has been dictated using voice recognition software. Any grammatical, typographical, or context distortions are unintentional and inherent to the software

## 2021-06-30 NOTE — PROGRESS NOTES
Progress Notes by Elmira Camp RN at 12/7/2020  3:10 PM     Author: Elmira Camp RN Service: -- Author Type: Registered Nurse    Filed: 12/8/2020  8:03 AM Encounter Date: 12/7/2020 Status: Signed    : Elmira Camp RN (Registered Nurse)       Julia Marie, CNP  Elmira Camp, RN             EKG reviewed.  Continue sotalol 120 mg two times a day.   Julia Mcduffie

## 2021-06-30 NOTE — PROGRESS NOTES
Progress Notes by Yeny Barber MD at 2/23/2021  8:10 AM     Author: Yeny Barber MD Service: -- Author Type: Physician    Filed: 2/23/2021  9:01 AM Encounter Date: 2/23/2021 Status: Signed    : Yeny Barber MD (Physician)           [facility] Follow-up Note    Assessment & Plan        1. Coronary artery disease involving native coronary artery of native heart without angina pectoris-angiography January 24, 2017 showed 30% left main lesion, mid left anterior descending 30% lesion followed by a 95% lesion that received a drug-coated stent, distal left anterior descending 20% lesion and second diagonal 70% lesion.  The ramus had a 50% lesion, circumflex had a proximal 40% lesion, and the right coronary artery had a proximal 20% and mid 20% lesions.  His stress test done in May 2017 looked normal so work on prevention.   Currently no symptoms.   2. Benign essential hypertension-under good control currently on lisinopril.   3. Dyslipidemia-in the Prominent study and also on atorvastatin and we are blinded to results of his lipids.   4. Type 2 diabetes mellitus with other specified complication, with long-term current use of insulin (H)-on Metformin as well as Lantus and NovoLog insulin as well as semaglutide.  We will need to keep tabs of his hemoglobin A1c.   5. Class 1 obesity due to excess calories with serious comorbidity and body mass index (BMI) of 33.0 to 33.9 in adult-work on weight loss.   6. Obstructive sleep apnea-has CPAP but not is compliant with this.   7. Paroxysmal atrial fibrillation (H)-symptomatic, not valvular and paroxysmal to persistent.  On Eliquis as well as sotalol.  Most recent corrected QT interval 481 ms.  Apparently he is to be having pulmonary vein isolation ablation shortly.     Plan  1.  Work on scheduling ablation for atrial fibrillation.  2.  Work on weight loss.  3.  Continue current medications including study drug.  4.  Follow-up per  study.    Subjective  CC: 60-year-old white gentleman being seen in study follow-up today.  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  No issues with chest discomfort, palpitations, significant shortness of breath, PND, orthopnea, syncope, dizziness or peripheral edema.    Medications  Current Outpatient Medications   Medication Sig Note   ? albuterol sulfate 90 mcg/actuation AePB Inhale 2 puffs as needed.    ? apixaban ANTICOAGULANT (ELIQUIS) 5 mg Tab tablet Take 1 tablet (5 mg total) by mouth 2 (two) times a day.    ? aspirin 81 MG EC tablet Take 81 mg by mouth daily.    ? atorvastatin (LIPITOR) 80 MG tablet Take 1 tablet (80 mg total) by mouth daily.    ? cetirizine (ZYRTEC) 10 MG tablet TAKE ONE TABLET BY MOUTH EVERY DAY AS NEEDED FOR RUNNY NOSE/ALLERGIES    ? cholecalciferol, vitamin D3, 1,000 unit tablet Take 1,000 Units by mouth daily.    ? cyanocobalamin 1000 MCG tablet Take 1,000 mcg by mouth daily.    ? hydrOXYzine (ATARAX) 25 MG tablet Take 1 tablet (25 mg total) by mouth every 6 (six) hours as needed for anxiety.    ? insulin aspart (NOVOLOG) 100 unit/mL injection Inject 30 Units under the skin 3 (three) times a day before meals.  2/6/2017: Reports he takes 35 U ; 3 x day, with meals   ? lisinopril (PRINIVIL,ZESTRIL) 40 MG tablet Take 40 mg by mouth daily.     ? loperamide (IMODIUM) 2 mg capsule Take 2 mg by mouth as needed.    ? metFORMIN (GLUCOPHAGE) 1000 MG tablet Take 1 tablet (1,000 mg total) by mouth 2 (two) times a day with meals.    ? minocycline (MINOCIN,DYNACIN) 100 MG capsule Take 100 mg by mouth 2 (two) times a day as needed.     ? omeprazole (PRILOSEC) 20 MG capsule Take 20 mg by mouth as needed.     ? potassium citrate (UROCIT-K) 5 mEq (540 mg) SR tablet Take 30 mEq by mouth 2 (two) times a day.     ? semaglutide 0.25 mg or 0.5 mg(2 mg/1.5 mL) PnIj Inject 1 mg under the skin every 7 days.     ? sotaloL (BETAPACE) 80 MG tablet Take 1.5 tablets (120 mg total) by mouth 2  (two) times a day.    ? syringe accessory Misc Inject 3 each as directed.    ? tamsulosin (FLOMAX) 0.4 mg Cp24 Take 1 capsule (0.4 mg total) by mouth daily.    ? testosterone cypionate (DEPOTESTOTERONE CYPIONATE) 200 mg/mL injection Inject 200 mg into the shoulder, thigh, or buttocks every 14 (fourteen) days.     ? insulin glargine (LANTUS) 100 unit/mL injection Inject 45 Units under the skin 2 (two) times a day.         Objective  /77 (Patient Site: Right Leg, Patient Position: Sitting, Cuff Size: Adult Regular)   Pulse 78   Wt (!) 242 lb (109.8 kg)   BMI 33.75 kg/m      General Appearance:    Alert, cooperative, no distress, appears stated age, mildly obese   Head:    Normocephalic, without obvious abnormality, atraumatic   Throat:   Lips, mucosa, and tongue normal; teeth and gums normal   Neck:   Supple, symmetrical, trachea midline, no adenopathy;        thyroid:  No enlargement/tenderness/nodules; no carotid    bruit or JVD   Back:     Symmetric, no curvature, ROM normal, no CVA tenderness   Lungs:     Clear to auscultation bilaterally, respirations unlabored   Chest wall:    No tenderness or deformity   Heart:    Regular rate and rhythm, S1 and S2 normal, no murmur, rub   or gallop   Abdomen:     Soft, non-tender, bowel sounds active all four quadrants,     no masses, no organomegaly   Extremities:   Normal, atraumatic, no cyanosis or edema   Pulses:   2+ and symmetric all extremities   Skin:   Skin color, texture, turgor normal, no rashes or lesions     Results    Lab Results personally reviewed   Lab Results   Component Value Date    CHOL 95 09/28/2020    CHOL 140 01/25/2017     Lab Results   Component Value Date    HDL 22 (L) 01/25/2017    HDL 26 (L) 01/24/2017     Lab Results   Component Value Date    LDLCALC 45 09/28/2020    LDLCALC 64 01/25/2017     Lab Results   Component Value Date    TRIG 269 (H) 01/25/2017    TRIG 330 (H) 01/24/2017     Lab Results   Component Value Date    WBC 7.1  12/01/2018    HGB 14.9 12/01/2018    HCT 42.4 12/01/2018     (L) 12/01/2018     Lab Results   Component Value Date    CREATININE 1.40 (!) 10/22/2020    BUN 18 12/01/2018     12/01/2018    K 3.5 12/01/2018    CO2 24 12/01/2018     Reviewed electrocardiogram sinus rhythm, leftward axis, borderline QT prolongation.    Review of Systems:                                          As above

## 2021-06-30 NOTE — PROGRESS NOTES
"Progress Notes by Julia Marie CNP at 12/3/2020  8:30 AM     Author: Julia Marie CNP Service: -- Author Type: Nurse Practitioner    Filed: 12/3/2020  9:17 AM Encounter Date: 12/3/2020 Status: Signed    : Julia Marie CNP (Nurse Practitioner)           The patient has been notified of following:     \"This video visit will be conducted via a call between you and your physician/provider. We have found that certain health care needs can be provided without the need for an in-person physical exam.  This service lets us provide the care you need with a video conversation.  If a prescription is necessary we can send it directly to your pharmacy.  If lab work is needed we can place an order for that and you can then stop by our lab to have the test done at a later time.      Patient has given verbal consent to a Video visit? Yes    HEART CARE VIDEO ENCOUNTER        The patient has chosen to have the visit conducted as a video visit, to reduce risk of exposure given the current status of Coronavirus in our community. This video visit is being conducted via a call between the patient and physician/provider. Health care needs are being provided without a physical exam.     Assessment/Recommendations   Assessment/Plan:  1.  Paroxysmal Atrial Fibrillation:  Decrease in A. fib with initiation of low-dose sotalol, but he continues to have symptomatic breakthrough.  We reviewed rhythm control with medications or pulmonary vein isolation ablation.  Ultimately, he is a good candidate for ablation.  On Saturday, increase sotalol to 120 mg twice daily EKG on Monday for QT interval monitoring.     He was reassured that atrial fibrillation is not life-threatening, but carries an increased risk for stroke.  He has a [id number] score of 3 for CAD, hypertension, and diabetes; per current guidelines anticoagulation is recommended, unless risks outweighed the benefits.  We discussed the risks and benefits of oral " anticoagulation agents.  Continue Eliquis 5 mg twice daily for stroke prophylaxis.  He denies side effects or bleeding issues.  [facility]     2.  Hypertension: Blood pressure 151/77 last week.  Continue lisinopril.  Monitor with increase in sotalol as above.     3.  Coronary artery disease: Denies anginal symptoms.  Recent nuclear stress test showed no evidence for ischemia.  Continue statin and aspirin.  He is also a participant in the [facility] study.     4.  Obstructive sleep apnea: Use of CPAP nightly.      Follow Up Plan:  1 month  I have reviewed the note as documented.  This accurately captures the substance of my conversation with the patient.    Total time of video between patient and provider was 14 minutes   Start Time: 0852   Stop Time: 0906    Originating Location (pt. Location): Home    Distant Location (provider location):  [facility]     Mode of Communication:  Video Conference via [employer]       History of Present Illness/Subjective    Man Acevedo  is a 60 y.o. male who is being evaluated via a billable video visit and has consented to a video visit.  He has a history of coronary artery disease with previous MI and stents, hypertension, hyperlipidemia, type 2 diabetes, and struct of sleep apnea for which he wears CPAP.  He had surgery for a left renal cancer in March 2019.  He also has a [facility]. he began having palpitations and event monitor worn 8/27/2020 through 9/9/2020 documented episodes of atrial fibrillation with rapid ventricular response as well as frequent PACs and bursts of atrial tachycardia.  Symptoms consist of a fast and irregular heartbeat, shortness of breath, lightheadedness, nausea.  Sometimes, he reports that he feels like he might pass out.  He was started on Eliquis for stroke prophylaxis.  He states that he had not been using his CPAP, but now is  too [place] states that he is feeling well.  He is having less A. fib, an episode about 2 weeks ago and another one last night which woke him up.   He denies chest discomfort, abdominal fullness/bloating or peripheral edema, shortness of breath, paroxysmal nocturnal dyspnea, orthopnea, lightheadedness, dizziness, pre-syncope, or syncope.     Cardiographics (EKG personally reviewed):  EKG done 11/9/2020 shows sinus rhythm at 75 bpm, PACs, QT/QTc interval measures 390/435 ms  EKG done 11/5/2020 shows sinus rhythm at 77 bpm, QT/QTc interval measures 378/427 ms     Event monitor worn 8/27/2020 through 9/9/2020 shows sinus rhythm with frequent PACs, often in bigeminy, frequent bursts of ectopic atrial tachycardia, and episodes of atrial fibrillation with rapid ventricular response.  Longest episode of A. fib lasted about 12 hours with an overall burden of about 15%.  No significant bradycardia or pauses.  No significant ventricular ectopy.     ECHO done 11/18/2020:    No previous study for comparison.    Normal left ventricular size. Mild left ventricular hypertrophy.    Left ventricle ejection fraction is normal. The calculated left ventricular ejection fraction is 57%.    Normal right ventricular size and systolic function.    Mild left atrial enlargement    Mild mitral regurgitation.     NM stress test done 8/27/2020:  ?  The nuclear stress test is negative for inducible myocardial ischemia.  ?  The left ventricular ejection fraction at rest is 58% with normal wall motion.  ?  Small area of non-transmural myocardial infarction of the mid anterior wall.      I have reviewed and updated the patient's Past Medical History, Social History, Family History and Medication List.     Physical Examination performed via live video encounter Review of Systems   General Appearance:    Well-appearing, in no distress, normal body habitus, upright.   ENT/Mouth: membranes moist, no nasal discharge or bleeding gums.  Normal  head shape, no evidence of injury or laceration.     EYES:  no scleral icterus, normal conjunctivae   Neck: no evidence of thyromegaly.  Supple   Chest/Lungs:   No audible wheezing equal chest wall expansion. Non labored breathing.  No cough.   Cardiovascular:   No evidence of elevated jugular venous pressure.  No evidence of pitting edema bilaterally    Abdomen:  no evidence of abdominal distention. No observe juandice.     Extremities: no cyanosis or clubbing noted.    Skin: no xanthelasma, normal skin color. No evidence of facial lacerations.      Neurologic: Normal arm motion bilateral, no tremors.  No evidence of focal defect.       Psychiatric: alert and oriented x3, calm.  Mood and affect appropriate.           See CMA note attached, personally reviewed                                   Medical History  Surgical History Family History Social History   Past Medical History:   Diagnosis Date   ? Coronary artery disease 24Jan2018   ? Diabetes mellitus (H) 01/2005    adult onset   ? GERD (gastroesophageal reflux disease)    ? High cholesterol    ? Hyperlipidemia 2008   ? Hypertension 2006   ? Kidney stones 2012   ? Liver disease    ? Myocardial infarction (H)    ? Neuropathy of foot 07/16/2014   ? Obesity    ? DEWAYNE on CPAP    ? Peripheral Neuropathy     Created by Conversion    ? Sleep apnea    ? Uncontrolled type 2 diabetes mellitus with other diabetic kidney complication, unspecified long term insulin use status     Created by Conversion     Past Surgical History:   Procedure Laterality Date   ? CARDIAC CATHETERIZATION  01/24/2017   ? CARPAL TUNNEL RELEASE Right 04/12/2016   ? CORONARY STENT PLACEMENT     ? KIDNEY STONE SURGERY Left 02/05/2018    stent placement, lithotripsy, nephrolithotomy   ? KNEE ARTHROSCOPY Left 8/6/15    Partial medial meniscectoy, [facility]   ? LEG SURGERY     ? Percutaneous Lithotomy  Left 07/11/2014   ? TX AMPUTATE METACARPAL+FINGER      Description:  "Metacarpal Amputation And Index Finger;  Recorded: 08/15/2014;  Comments: For \"cartilage cancer\"   ? HI CATH PLACEMENT & NJX CORONARY ART ANGIO IMG S&I N/A 1/24/2017    Procedure: Coronary Angiogram;  Surgeon: Melissa Freeman MD;  Location: [facility] Cath Lab;  Service: Cardiology   ? HI L HRT CATH W/NJX L VENTRICULOGRAPHY IMG S&I N/A 1/24/2017    Procedure: Left Heart Catheterization with Left Ventriculogram;  Surgeon: Melissa Freeman MD;  Location: [facility] Cath Lab;  Service: Cardiology   ? HI LAP,CHOLECYSTECTOMY      Description: Cholecystectomy Laparoscopic;  Proc Date: 12/11/2013;   ? HI PERCUT REMV KID STONE,UP TO 2 CM      Description: Percutaneous Lithotomy;  Proc Date: 11/05/2012;    History reviewed. No pertinent family history.   Social History     Socioeconomic History   ? Marital status:      Spouse name: Not on file   ? Number of children: Not on file   ? Years of education: 13   ? Highest education level: Not on file   Occupational History   ? Not on file   Social Needs   ? Financial resource strain: Not on file   ? Food insecurity     Worry: Not on file     Inability: Not on file   ? Transportation needs     Medical: Not on file     Non-medical: Not on file   Tobacco Use   ? Smoking status: Former Smoker     Packs/day: 0.50   ? Smokeless tobacco: Never Used   ? Tobacco comment: Patient reports he is trying to quit.   Substance and Sexual Activity   ? Alcohol use: Yes     Frequency: 2-4 times a month     Drinks per session: 1 or 2     Binge frequency: Never   ? Drug use: No   ? Sexual activity: Not on file   Lifestyle   ? Physical activity     Days per week: Not on file     Minutes per session: Not on file   ? Stress: Not on file   Relationships   ? Social connections     Talks on phone: Not on file     Gets together: Not on file     Attends Judaism service: Not on file     Active member of club or organization: Not on file     Attends meetings of clubs or organizations: Not on file     " Relationship status: Not on file   ? Intimate partner violence     Fear of current or ex partner: Not on file     Emotionally abused: Not on file     Physically abused: Not on file     Forced sexual activity: Not on file   Other Topics Concern   ? Not on file   Social History Narrative    Wife of 21 years passed away on 10/15/2017. Had 8 kids with her - 3 of hers from a previous marriage, 3 of his from a previous marriage, and 2 kids they adopted together. He has 20 grandchildren.           Medications  Allergies   Current Outpatient Medications   Medication Sig Dispense Refill   ? albuterol sulfate 90 mcg/actuation AePB Inhale 2 puffs as needed.     ? apixaban ANTICOAGULANT (ELIQUIS) 5 mg Tab tablet Take 1 tablet (5 mg total) by mouth 2 (two) times a day. 60 tablet 11   ? aspirin 81 MG EC tablet Take 81 mg by mouth daily.     ? atorvastatin (LIPITOR) 80 MG tablet Take 1 tablet (80 mg total) by mouth daily. 30 tablet 1   ? cetirizine (ZYRTEC) 10 MG tablet TAKE ONE TABLET BY MOUTH EVERY DAY AS NEEDED FOR RUNNY NOSE/ALLERGIES     ? cholecalciferol, vitamin D3, 1,000 unit tablet Take 1,000 Units by mouth daily.     ? cyanocobalamin 1000 MCG tablet Take 1,000 mcg by mouth daily.     ? hydrOXYzine (ATARAX) 25 MG tablet Take 1 tablet (25 mg total) by mouth every 6 (six) hours as needed for anxiety. 15 tablet 0   ? insulin aspart (NOVOLOG) 100 unit/mL injection Inject 30 Units under the skin 3 (three) times a day before meals.      ? insulin glargine (LANTUS) 100 unit/mL injection Inject 45 Units under the skin 2 (two) times a day.      ? lisinopril (PRINIVIL,ZESTRIL) 40 MG tablet Take 20 mg by mouth daily.     ? loperamide (IMODIUM) 2 mg capsule Take 2 mg by mouth as needed.     ? metFORMIN (GLUCOPHAGE) 1000 MG tablet Take 1 tablet (1,000 mg total) by mouth 2 (two) times a day with meals. 60 tablet 6   ? minocycline (MINOCIN,DYNACIN) 100 MG capsule Take 100 mg by mouth 2 (two) times a day as needed.      ? omeprazole  (PRILOSEC) 20 MG capsule Take 20 mg by mouth as needed.      ? potassium citrate (UROCIT-K) 5 mEq (540 mg) SR tablet Take 30 mEq by mouth 2 (two) times a day.      ? semaglutide 0.25 mg or 0.5 mg(2 mg/1.5 mL) PnIj Inject 0.25 mg under the skin every 7 days.     ? sodium chloride (OCEAN) 0.65 % nasal spray SPRAY 2 SPRAYS IN EACH NOSTRIL EVERY 4 HOURS AS NEEDED FOR NASAL CONGESTION.     ? sotaloL (BETAPACE) 80 MG tablet Take 1.5 tablets (120 mg total) by mouth 2 (two) times a day. 270 tablet 3   ? Study Drug pemafibrate 0.2 mg/placebo (PROMINENT) tablet Take 0.2 mg by mouth 2 (two) times a day.     ? syringe accessory Misc Inject 3 each as directed.     ? tamsulosin (FLOMAX) 0.4 mg Cp24 Take 1 capsule (0.4 mg total) by mouth daily. 14 capsule 0   ? testosterone cypionate (DEPOTESTOTERONE CYPIONATE) 200 mg/mL injection Inject 200 mg into the shoulder, thigh, or buttocks every 14 (fourteen) days.        Current Facility-Administered Medications   Medication Dose Route Frequency Provider Last Rate Last Admin   ? Study Drug pemafibrate 0.2 mg/placebo tablet 0.2 mg (PROMINENT)  0.2 mg Oral BID Presley Torres RN        Allergies   Allergen Reactions   ? Gabapentin    ? Tolmetin Unknown     Pt is on Plavix         Lab Results    Chemistry/lipid CBC Cardiac Enzymes/BNP/TSH/INR   Lab Results   Component Value Date    CHOL 95 09/28/2020    HDL 22 (L) 01/25/2017    LDLCALC 45 09/28/2020    TRIG 269 (H) 01/25/2017    CREATININE 1.40 (!) 10/22/2020    BUN 18 12/01/2018    K 3.5 12/01/2018     12/01/2018     (H) 12/01/2018    CO2 24 12/01/2018    Lab Results   Component Value Date    WBC 7.1 12/01/2018    HGB 14.9 12/01/2018    HCT 42.4 12/01/2018    MCV 94 12/01/2018     (L) 12/01/2018    Lab Results   Component Value Date    TROPONINI <0.01 12/01/2018    TSH 1.13 11/05/2020    INR 1.06 01/24/2017        Julia Marie CNP  Cardiac Electrophysiology

## 2021-07-04 NOTE — PROGRESS NOTES
Progress Notes by Julia Marie CNP at 6/17/2021  7:50 AM     Author: Julia Marie CNP Service: -- Author Type: Nurse Practitioner    Filed: 6/17/2021  8:42 AM Encounter Date: 6/17/2021 Status: Signed    : Julia Marie CNP (Nurse Practitioner)             Assessment/Recommendations   Assessment/Plan:    No changes to his medications today.  He was urged to quit smoking.  He will continue to follow-up with research per protocol for PROMINENT study.       History of Present Illness/Subjective    Man JOSEPH Acevedo Sr. is seen at [facility] today for PROMINENT visit 27.  Pemafibrate to Reduce cardiovascular OutcoMes by reducing triglycerides IN patiENts with diabeTes (PROMINENT) clinical trial.  He has a history of coronary artery disease with previous MI and stents recently on 4/28/2021, atrial fibrillation, hypertension, type 2 diabetes, and hyperlipidemia.  He had surgery for a left renal cancer in March 2019.  He also has a [facility]  He quit smoking on 4/20/2021.  His A. fib is fairly well controlled on sotalol with only occasional episodes of A. fib.  He is [facility].  He continues to have some chronic dyspnea on exertion, but overall is feeling well.  He has no health concerns today and denies fatigue, lightheadedness, shortness of breath, orthopnea, PND, palpitations, chest pain, abdominal fullness/bloating and lower extremity edema.         Physical Examination Review of Systems   Vitals:    06/17/21 0752   BP: 124/80   Pulse: 73   Resp: 14     Body mass index is 32.92 kg/m .  Wt Readings from Last 3 Encounters:   06/17/21 (!) 236 lb (107 kg)   02/23/21 (!) 242 lb (109.8 kg)   02/23/21 (!) 242 lb (109.8 kg)       General Appearance:   Alert, cooperative and in no acute distress.   HEENT:  No scleral icterus, EOM intact, PERRL; the mucous membranes were pink and moist. Cervical lymph nodes  nontender and nonpalpable.   Neck: Supple.  JVP normal.  No HJR.  No obvious thyromegaly.     Chest: The spine was straight. The chest was symmetric.   Lungs:   Respirations unlabored; the lungs are clear to auscultation.   Cardiovascular:   Regular rhythm. S1 and S2 without murmur, clicks or rubs. Radial, carotid and posterior tibial pulses are intact and symmetrical.  No carotid bruits noted.   Abdomen:  Soft, nontender, nondistended, bowel sounds present   Extremities: No cyanosis, clubbing, or edema.   Musculoskeletal:  Moves all extremities.   Skin: No bruising or wounds.   Neurologic: Mood and affect are appropriate.    General: WNL  Eyes: WNL  Ears/Nose/Throat: WNL  Lungs: Shortness of Breath, Snoring  Heart: Shortness of Breath with activity, Irregular Heartbeat  Stomach: Constipation, Diarrhea  Bladder: Frequent Urination at Night  Muscle/Joints: Joint Pain, Muscle Weakness     Nervous System: Daytime Sleepiness  Mental Health: WNL     Blood: WNL       Medical History  Surgical History Family History Social History   Past Medical History:   Diagnosis Date   ? Coronary artery disease 24Jan2018   ? Diabetes mellitus (H) 01/2005    adult onset   ? GERD (gastroesophageal reflux disease)    ? High cholesterol    ? Hyperlipidemia 2008   ? Hypertension 2006   ? Kidney stones 2012   ? Liver disease    ? Myocardial infarction (H)    ? Neuropathy of foot 07/16/2014   ? Obesity    ? DEWAYNE on CPAP    ? Peripheral Neuropathy     Created by Conversion    ? Sleep apnea    ? Uncontrolled type 2 diabetes mellitus with other diabetic kidney complication, unspecified long term insulin use status     Created by Conversion     Past Surgical History:   Procedure Laterality Date   ? CARDIAC CATHETERIZATION  01/24/2017   ? CARPAL TUNNEL RELEASE Right 04/12/2016   ? CORONARY STENT PLACEMENT     ? KIDNEY STONE SURGERY Left 02/05/2018    stent placement, lithotripsy, nephrolithotomy   ? KNEE ARTHROSCOPY Left 8/6/15    Partial medial  "meniscectoy, [facility]   ? LEG SURGERY     ? Percutaneous Lithotomy  Left 07/11/2014   ? MD AMPUTATE METACARPAL+FINGER      Description: Metacarpal Amputation And Index Finger;  Recorded: 08/15/2014;  Comments: For \"cartilage cancer\"   ? MD CATH PLACEMENT & NJX CORONARY ART ANGIO IMG S&I N/A 1/24/2017    Procedure: Coronary Angiogram;  Surgeon: Melissa Freeman MD;  Location: [facility] Cath Lab;  Service: Cardiology   ? MD L HRT CATH W/NJX L VENTRICULOGRAPHY IMG S&I N/A 1/24/2017    Procedure: Left Heart Catheterization with Left Ventriculogram;  Surgeon: Melissa Freeman MD;  Location: [facility] Cath Lab;  Service: Cardiology   ? MD LAP,CHOLECYSTECTOMY      Description: Cholecystectomy Laparoscopic;  Proc Date: 12/11/2013;   ? MD PERCUT REMV KID STONE,UP TO 2 CM      Description: Percutaneous Lithotomy;  Proc Date: 11/05/2012;    History reviewed. No pertinent family history. Social History     Tobacco Use   ? Smoking status: Former Smoker     Packs/day: 0.50   ? Smokeless tobacco: Never Used   ? Tobacco comment: Patient reports he is trying to quit.   Substance Use Topics   ? Alcohol use: Yes     Frequency: 2-4 times a month     Drinks per session: 1 or 2     Binge frequency: Never   ? Drug use: No          Medications  Allergies   Current Outpatient Medications   Medication Sig Dispense Refill   ? albuterol sulfate 90 mcg/actuation AePB Inhale 2 puffs as needed.     ? apixaban ANTICOAGULANT (ELIQUIS) 5 mg Tab tablet Take 1 tablet (5 mg total) by mouth 2 (two) times a day. 60 tablet 11   ? aspirin 81 MG EC tablet Take 81 mg by mouth daily.     ? atorvastatin (LIPITOR) 80 MG tablet Take 1 tablet (80 mg total) by mouth daily. 30 tablet 1   ? cetirizine (ZYRTEC) 10 MG tablet TAKE ONE TABLET BY MOUTH EVERY DAY AS NEEDED FOR RUNNY NOSE/ALLERGIES     ? cholecalciferol, vitamin D3, 1,000 unit tablet Take 1,000 Units by mouth daily.     ? cyanocobalamin 1000 MCG tablet Take 1,000 mcg " by mouth daily.     ? hydrOXYzine (ATARAX) 25 MG tablet Take 1 tablet (25 mg total) by mouth every 6 (six) hours as needed for anxiety. 15 tablet 0   ? insulin aspart (NOVOLOG) 100 unit/mL injection Inject 15 Units under the skin 3 (three) times a day before meals. 15 units twice a day     ? insulin glargine (LANTUS) 100 unit/mL injection Inject 40 Units under the skin 2 (two) times a day. 40 units twice daily     ? lisinopril (PRINIVIL,ZESTRIL) 40 MG tablet Take 40 mg by mouth daily.      ? loperamide (IMODIUM) 2 mg capsule Take 2 mg by mouth as needed.     ? metFORMIN (GLUCOPHAGE) 1000 MG tablet Take 1 tablet (1,000 mg total) by mouth 2 (two) times a day with meals. 60 tablet 6   ? minocycline (MINOCIN,DYNACIN) 100 MG capsule Take 100 mg by mouth 2 (two) times a day as needed.      ? omeprazole (PRILOSEC) 20 MG capsule Take 20 mg by mouth as needed.      ? potassium citrate (UROCIT-K) 5 mEq (540 mg) SR tablet Take 30 mEq by mouth 2 (two) times a day.      ? semaglutide 0.25 mg or 0.5 mg(2 mg/1.5 mL) PnIj Inject 1 mg under the skin every 7 days.      ? sotaloL (BETAPACE) 80 MG tablet Take 1.5 tablets (120 mg total) by mouth 2 (two) times a day. 270 tablet 3   ? syringe accessory Misc Inject 3 each as directed.     ? tamsulosin (FLOMAX) 0.4 mg Cp24 Take 1 capsule (0.4 mg total) by mouth daily. 14 capsule 0   ? clopidogreL (PLAVIX) 75 mg tablet Take 75 mg by mouth daily.     ? testosterone cypionate (DEPOTESTOTERONE CYPIONATE) 200 mg/mL injection Inject 200 mg into the shoulder, thigh, or buttocks every 14 (fourteen) days.        Current Facility-Administered Medications   Medication Dose Route Frequency Provider Last Rate Last Admin   ? Study Drug pemafibrate 0.2 mg/placebo tablet 0.2 mg (PROMINENT)  0.2 mg Oral BID Presley Torres, RN        Allergies   Allergen Reactions   ? Gabapentin    ? Tolmetin Unknown     Pt is on Plavix          This note has been dictated using voice recognition software. Any grammatical,  typographical, or context distortions are unintentional and inherent to the software.

## 2021-07-06 VITALS
DIASTOLIC BLOOD PRESSURE: 80 MMHG | HEIGHT: 71 IN | HEART RATE: 73 BPM | BODY MASS INDEX: 33.04 KG/M2 | RESPIRATION RATE: 14 BRPM | WEIGHT: 236 LBS | SYSTOLIC BLOOD PRESSURE: 124 MMHG

## 2021-07-14 PROBLEM — I20.9 NEW-ONSET ANGINA (H): Status: RESOLVED | Noted: 2017-01-23 | Resolved: 2017-02-14

## 2021-09-03 ENCOUNTER — TELEPHONE (OUTPATIENT)
Dept: CARDIOLOGY | Facility: CLINIC | Age: 61
End: 2021-09-03

## 2021-09-03 NOTE — TELEPHONE ENCOUNTER
LVM to check in for study planned follow up call.   Will try back next week.    Presley Torres, RN  Clinical Research Nurse  [phone]

## 2021-09-07 ENCOUNTER — TELEPHONE (OUTPATIENT)
Dept: CARDIOLOGY | Facility: CLINIC | Age: 61
End: 2021-09-07

## 2021-09-07 ENCOUNTER — DOCUMENTATION ONLY (OUTPATIENT)
Dept: CARDIOLOGY | Facility: CLINIC | Age: 61
End: 2021-09-07

## 2021-09-07 RX ORDER — MINOCYCLINE HYDROCHLORIDE 100 MG/1
100 CAPSULE ORAL 2 TIMES DAILY PRN
COMMUNITY

## 2021-09-07 RX ORDER — POTASSIUM CITRATE 15 MEQ/1
30 TABLET, EXTENDED RELEASE ORAL 2 TIMES DAILY
COMMUNITY

## 2021-09-07 RX ORDER — SYRINGE ACCESSORY
3 EACH MISCELLANEOUS
COMMUNITY

## 2021-09-07 RX ORDER — TESTOSTERONE CYPIONATE 200 MG/ML
200 INJECTION, SOLUTION INTRAMUSCULAR
COMMUNITY
Start: 2020-07-28

## 2021-09-07 NOTE — PROGRESS NOTES
Pemafibrate to Reduce cardiovascular OutcoMes by reducing triglycerides IN patiENts with diabeTes (PROMINENT) clinical trial.    ADVERSE EVENT Description:  Subject was noted to have a lung nodule under surveillance through the [facility].  Former smoker and has annual screens for this.  Noted by study CRA and request to be added as AE.    Adverse Event:  Pulmonary nodule  Serious:  [] Yes   [x] No  Reportable to IRB:   [] Yes   [x] No  Severity (mild/moderate/severe): mild   Date of Awareness: 9/2/21  Start Date: 3/2/2020  End Date: ongoing  Action taken with study treatment:  [x] Dose Not Changed  Outcome:   [x]Not Recovered/Not Resolved    Medication or therapies taken:  [] Yes   [x] No    Dr. Barber: Please assign causality of above AE  Related to study drug?     [] Yes   [x] No

## 2021-09-07 NOTE — TELEPHONE ENCOUNTER
Pemafibrate to Reduce cardiovascular OutcoMes by reducing triglycerides IN patiENts with diabeTes (PROMINENT) clinical trial.    Study telephone visit form:    Subject Number:   [id number]                                     Dr. Barber- PI      CONCOMITANT MEDICATIONS  ? Investigator to report if participant needs treatment with prohibited medications (Fibrates or other agents with PPAR-? agonist activity (e.g., saroglitazar); See protocol for exclusionary medications and actions to be taken.    VISIT SCHEDULING AND CONTACT CONFIRMATION  ? Confirm date of next study visit   ? Confirm information on Subject Information Form or update as needed      Visit Number:__[id number]_____  Visit Date: 21      Was the subject, relatives or health care provider (as per consent) contacted by phone?    [x] Yes  [] No    Yes [x]  No []   If Yes, Date of Contact:   Date: ___21_ ____ ____   [facility] YYYY  If No, please document attempts to contact subject (include date and type of contact)   Date: ____ ____ ____   Type: [] Tel _ [] Other___  [] 2 Date:      Name of Person: Contacted:   _____Self/Subject_______________   Relationship to Subject:   ______________________  Type:[]  Tel[x]  Other _______   Date: __21__ ____ ____   Type: [] Tel [] Other _______    Subject Status on the day assessed     [x] Subject alive  []  Subject    [] Unknown at present Please complete  Death Details Form in InForm and submit source documents to [facility]        Review Subject Contact Information Form and update as needed   [x]  Review concomitant medication use   [x]  Query participant and record any reported AEs   [x] Query participant and record any CV efficacy events   [x] Confirm next study visit: ___Oct tba__________   [x] Instruct participants to bring all study supplies with them to the next in-person visit    Instruct participants to bring all study supplies with them to the next in-person visit     Joe follows with the [facility] now due to [facility]  not accepting insurance anymore.  Doing cardiac rehab there and reports his recent lab work and everything looks good - kidney function very good.      Presley Torres, RN  Clinical Research Nurse  [phone]

## 2021-10-21 DIAGNOSIS — E78.5 HYPERLIPIDEMIA LDL GOAL <100: Primary | ICD-10-CM

## 2021-11-04 ENCOUNTER — OFFICE VISIT (OUTPATIENT)
Dept: CARDIOLOGY | Facility: CLINIC | Age: 61
End: 2021-11-04

## 2021-11-04 ENCOUNTER — OFFICE VISIT (OUTPATIENT)
Dept: CARDIOLOGY | Facility: CLINIC | Age: 61
End: 2021-11-04
Payer: COMMERCIAL

## 2021-11-04 VITALS
BODY MASS INDEX: 32.5 KG/M2 | SYSTOLIC BLOOD PRESSURE: 158 MMHG | RESPIRATION RATE: 16 BRPM | HEART RATE: 88 BPM | DIASTOLIC BLOOD PRESSURE: 80 MMHG | WEIGHT: 233 LBS

## 2021-11-04 DIAGNOSIS — E78.5 DYSLIPIDEMIA: Primary | ICD-10-CM

## 2021-11-04 DIAGNOSIS — E78.5 DYSLIPIDEMIA: Primary | Chronic | ICD-10-CM

## 2021-11-04 PROCEDURE — 99214 OFFICE O/P EST MOD 30 MIN: CPT | Performed by: NURSE PRACTITIONER

## 2021-11-04 PROCEDURE — 99207 PR NO CHARGE-RESEARCH SERVICE: CPT

## 2021-11-04 RX ORDER — LANOLIN ALCOHOL/MO/W.PET/CERES
1000 CREAM (GRAM) TOPICAL DAILY
COMMUNITY

## 2021-11-04 NOTE — PATIENT INSTRUCTIONS
It was great to see you again today for the Prominent triglyceride study.  We will call you in 2 months for a study telephone visit.    We will also see you back in 4 months for your Visit 30.  Please remember to bring back your study drug and packages (both empty or full) to every study visit.  Inform us of any changes in your health and call with any questions.  Thanks!    Presley Torres RN  Clinical Research Nurse  [phone]

## 2021-11-04 NOTE — LETTER
11/4/2021    Jovita Nunez  [facility]   [facility] [address]    RE: Man Acevedo Sr.       Dear Colleague,    I had the pleasure of seeing Man Acevedo Sr. in the [facility].  Pemafibrate to Reduce cardiovascular OutcoMes by reducing triglycerides IN patiENts with diabeTes (PROMINENT) clinical trial.  Subject seen in clinic today for study Visit 29.      Re-consent process:   The study discussion included review of the following:     Study purpose    Qualifications for participation    Length of study participation    Study procedures    Risks and side effects    Benefits (if any)    Voluntary nature of participation    Alternatives to participation    Confidentiality of records    Financial considerations     Location updates    Beninese liver findings update    Subject asked questions and agreed that he received answers that satisfied him.    Consent form (version 6.0  [id number], Approved 10/5/2021)  A signed copy was given to the subject & forwarded to medical records.    Subject seen by provider Yvonne Florian for study related physical exam.  See provider note and flow sheets for vital signs.  Waist measures 118.0 cm    Study efficacy, endpoints and adverse events reviewed with subject.  Cardiovascular risk discussed.     Study alcohol consumption stipulations and education reviewed with subject:    Subject reports  [] Never [x] Current [] Former.   2 drinks per [] Day [] Week [] Month [x] Occasional.  2 maximum drinks per sitting.     Study medication box # [id number] with 0 tabs  Study medication box # [id number] with 0 tabs  Study medication box # [id number] with 0 tabs  Study medication box # [id number] with 0 tabs  returned by subject.  Total tablet count of 0 for 100 % compliance.  Study medication box #'s [id number], [id number], [id number], [id number] dispensed to subject.  Education provided on medication compliance and  administration    Plan: Study Visit 30 telephone call with subject in 2 months.  Will schedule study Visit  31 with subject in 4 months back in clinic.    Presley Torres RN  Clinical Research Nurse  [phone]    ADVERSE EVENT Description: elevated BP today 158/80    Adverse Event: hypertension exacerbation  Serious:  [] Yes   [x] No  Reportable to IRB:   [] Yes   [x] No  Severity (mild/moderate/severe): mild   Date of Awareness: 11/4/21  Start Date: 11/4/21  End Date: ongoing  Action taken with study treatment:  [x] Dose Not Changed  [] Dose Increased  Outcome:   [x]Not Recovered/Not Resolved  []Recovered/Resolved  []Recovered/Resolved with Sequelae - specify  Medication or therapies taken:  [] Yes   [x] No    Dr. Barber: Please assign causality of above AE  Related to study drug?     [] Yes   [] No       Presley Torres RN   [facility]

## 2021-11-04 NOTE — PROGRESS NOTES
Pemafibrate to Reduce cardiovascular OutcoMes by reducing triglycerides IN patiENts with diabeTes (PROMINENT) clinical trial.    Subject seen in clinic today for study Visit 29.      Re-consent process:   The study discussion included review of the following:   Study purpose  Qualifications for participation  Length of study participation  Study procedures  Risks and side effects  Benefits (if any)  Voluntary nature of participation  Alternatives to participation  Confidentiality of records  Financial considerations   Location updates  Barbadian liver findings update    Subject asked questions and agreed that he received answers that satisfied him.    Consent form (version 6.0  [id number], Approved 10/5/2021)  A signed copy was given to the subject & forwarded to medical records.    Subject seen by provider Yvonne Florian for study related physical exam.  See provider note and flow sheets for vital signs.    Waist measures 118.0 cm    Study efficacy, endpoints and adverse events reviewed with subject.  Cardiovascular risk discussed.     Study alcohol consumption stipulations and education reviewed with subject:    Subject reports  [] Never [x] Current [] Former.   2 drinks per [] Day [] Week [] Month [x] Occasional.  2 maximum drinks per sitting.     Study medication box # [id number] with 0 tabs  Study medication box # [id number] with 0 tabs  Study medication box # [id number] with 0 tabs  Study medication box # [id number] with 0 tabs  returned by subject.  Total tablet count of 0 for 100 % compliance.  Study medication box #'s [id number], [id number], [id number], [id number] dispensed to subject.  Education provided on medication compliance and administration    Plan: Study Visit 30 telephone call with subject in 2 months.  Will schedule study Visit  31 with subject in 4 months back in clinic.    Presley Torres RN  Clinical Research Nurse  [phone]    ADVERSE EVENT Description: elevated BP today 158/80    Adverse Event: hypertension  exacerbation  Serious:  [] Yes   [x] No  Reportable to IRB:   [] Yes   [x] No  Severity (mild/moderate/severe): mild   Date of Awareness: 11/4/21  Start Date: 11/4/21  End Date: ongoing  Action taken with study treatment:  [x] Dose Not Changed  [] Dose Increased  Outcome:   [x]Not Recovered/Not Resolved  []Recovered/Resolved  []Recovered/Resolved with Sequelae - specify  Medication or therapies taken:  [] Yes   [x] No    Dr. Barber: Please assign causality of above AE  Related to study drug?     [] Yes   [x] No

## 2021-11-04 NOTE — PROGRESS NOTES
"HEART CARE RESEARCH NOTE      [facility]  [phone]      Assessment/Recommendations   No changes to his medications today. He will continue to follow-up with research per protocol for Prominent study.  I recommended that [facility].        History of Present Illness/Subjective    Man RUIZ Curtis Geiger is seen at [facility] for Prominent research study.  He has a history of coronary artery disease with most recent MI in April 2021, atrial fibrillation, hypertension, diabetes, dyslipidemia, and renal cancer.  [facility].  Over the past few months, he notes increased \"racing heart rates.\"  His metoprolol has been [facility].  He is being seen there today.  He notes improvement since increasing metoprolol last week.  He has obstructive sleep apnea but stopped using his CPAP a few months ago due to CPAP recall.  This may be a trigger for his A. fib.  He denies lightheadedness, shortness of breath, dyspnea on exertion, chest pain and lower extremity edema.      Patient Active Problem List   Diagnosis     Peripheral Neuropathy     Nonalcoholic Fatty Liver Disease     Lower Back Pain     Adjustment Disorder With Anxiety And Depressed Mood     Neck Pain     Dyslipidemia     Pain in joint, ankle and foot     Obstructive sleep apnea     Right carpal tunnel syndrome     Left hand weakness     Tennis elbow     Benign essential hypertension     Obesity due to excess calories     Coronary artery disease involving native coronary artery of native heart without angina pectoris     NSTEMI (non-ST elevated myocardial infarction) (H)     Tobacco use     DM (diabetes mellitus) (H)     Paroxysmal atrial fibrillation (H)       Past Medical History:   Diagnosis Date     Atrial fibrillation (H)      Coronary artery disease 24Jan2018     Diabetes mellitus (H) 01/2005    adult onset     GERD (gastroesophageal reflux " "disease)      High cholesterol      Hyperlipidemia 2008     Hypertension 2006     Kidney stones 2012     Liver disease      Myocardial infarction (H)      Neuropathy of foot 07/16/2014     Obesity      DEWAYNE on CPAP      Sleep apnea      Uncontrolled type 2 diabetes mellitus with other diabetic kidney complication, unspecified long term insulin use status     Created by Conversion      Unspecified hereditary and idiopathic peripheral neuropathy     Created by Conversion        Past Surgical History:   Procedure Laterality Date     ARTHROSCOPY KNEE Left 8/6/15    Partial medial meniscectoy, foreign body removal[facility]     C LAP,CHOLECYSTECTOMY/EXPLORE      Description: Cholecystectomy Laparoscopic;  Proc Date: 12/11/2013;     CARDIAC CATHETERIZATION  01/24/2017 [facility]     CORONARY STENT PLACEMENT       HC AMPUTATE METACARPAL+FINGER      Description: Metacarpal Amputation And Index Finger;  Recorded: 08/15/2014;  Comments: For \"cartilage cancer\"     HC PERCUT NEPHROSTOLITHOTOMY W W/O DILATION <=2 CM      Description: Percutaneous Lithotomy;  Proc Date: 11/05/2012;     KIDNEY STONE SURGERY Left 02/05/2018    stent placement, lithotripsy, nephrolithotomy     LEG SURGERY       OTHER SURGICAL HISTORY Left 07/11/2014    Percutaneous Lithotomy      [facility] CATH PLACEMENT & NJX CORONARY ART ANGIO IMG S&I N/A 1/24/2017    Procedure: Coronary Angiogram;  Surgeon: Melissa Freeman MD;  Location: [facility];  Service: Cardiology     VA L HRT CATH W/NJX L VENTRICULOGRAPHY IMG S&I N/A 1/24/2017    Procedure: Left Heart Catheterization with Left Ventriculogram;  Surgeon: Melissa Freeman MD;  Location: [facility] Cath Lab;  Service: Cardiology     RELEASE CARPAL TUNNEL Right 04/12/2016       No family history on file.    Social History     Socioeconomic History     Marital status:      Spouse name: Not on file     Number of children: Not on file     Years of education: 13     Highest education " level: Not on file   Occupational History     Not on file   Tobacco Use     Smoking status: Former Smoker     Packs/day: 0.50     Smokeless tobacco: Never Used     Tobacco comment: Patient reports he is trying to quit.   Substance and Sexual Activity     Alcohol use: Yes     Drug use: No     Sexual activity: Not on file   Other Topics Concern     Not on file   Social History Narrative    Wife of 21 years passed away on 10/15/2017. Had 8 kids with her - 3 of hers from a previous marriage, 3 of his from a previous marriage, and 2 kids they adopted together. He has 20 grandchildren.      Social Determinants of Health     Financial Resource Strain:      Difficulty of Paying Living Expenses:    Food Insecurity:      Worried About Running Out of Food in the Last Year:      Ran Out of Food in the Last Year:    Transportation Needs:      Lack of Transportation (Medical):      Lack of Transportation (Non-Medical):    Physical Activity:      Days of Exercise per Week:      Minutes of Exercise per Session:    Stress:      Feeling of Stress :    Social Connections:      Frequency of Communication with Friends and Family:      Frequency of Social Gatherings with Friends and Family:      Attends [employer] Services:      Active Member of Clubs or Organizations:      Attends Club or Organization Meetings:      Marital Status:    Intimate Partner Violence:      Fear of Current or Ex-Partner:      Emotionally Abused:      Physically Abused:      Sexually Abused:        Current Outpatient Medications   Medication Sig Dispense Refill     albuterol (PROAIR RESPICLICK) 108 (90 Base) MCG/ACT inhaler Inhale 2 puffs into the lungs as needed       AMLODIPINE BESYLATE PO Take 5 mg by mouth daily       apixaban ANTICOAGULANT (ELIQUIS) 5 MG tablet Take 5 mg by mouth 2 times daily       ATORVASTATIN CALCIUM PO Take 80 mg by mouth daily       calcium-vitamin D 500-125 MG-UNIT TABS Take by mouth daily       Clopidogrel Bisulfate (PLAVIX PO) Take  75 mg by mouth daily       insulin aspart (NOVOLOG FLEXPEN) 100 UNIT/ML injection Inject 35 Units Subcutaneous 2 times daily (with meals)       insulin glargine (LANTUS) 100 UNIT/ML injection Inject 60 Units Subcutaneous 2 times daily       LISINOPRIL PO Take 40 mg by mouth daily       METFORMIN HCL PO Take 1,000 mg by mouth 2 times daily (with meals)       METOPROLOL SUCCINATE ER PO Take 50 mg by mouth 2 times daily        minocycline (MINOCIN) 100 MG capsule Take 100 mg by mouth 2 times daily as needed       Parenteral Therapy Supplies (UNIVERSAL SYRINGE TIP ADAPTOR) MISC 3 each       potassium citrate (UROCIT-K) 5 MEQ (540 MG) CR tablet Take 30 mEq by mouth 2 times daily       semaglutide (OZEMPIC) 2 MG/1.5ML SOPN pen Inject 1 mg Subcutaneous once a week       study drug pemafibrate 0.2 mg vs placebo, PROMINENT, 0.2 mg TABS tablet Take 0.2 mg by mouth 2 times daily       TAMSULOSIN HCL PO Take 0.4 mg by mouth daily       testosterone cypionate (DEPOTESTOSTERONE) 200 MG/ML injection Inject 200 mg into the muscle every 14 days       cyanocobalamin (VITAMIN B-12) 1000 MCG tablet Take 1,000 mcg by mouth daily         Allergies   Allergen Reactions     Gabapentin Swelling     Of face and hands     Nsaids      Pt is on Plavix        Physical Examination Review of Systems   BP (!) 158/80 (BP Location: Left arm, Patient Position: Sitting, Cuff Size: Adult Large)   Pulse 88   Resp 16   Wt 105.7 kg (233 lb)   BMI 32.50 kg/m    Body mass index is 32.5 kg/m .  Wt Readings from Last 3 Encounters:   11/04/21 105.7 kg (233 lb)   06/17/21 107 kg (236 lb)   02/23/21 109.8 kg (242 lb)       General Appearance:     Alert, cooperative and in no acute distress.   ENT/Mouth: membranes moist, no oral lesions or bleeding gums.      EYES:  no scleral icterus, normal conjunctivae   Chest/Lungs:   lungs are clear to auscultation, no rales or wheezing, respirations unlabored   Cardiovascular:   Regular. Normal first and second heart  "sounds, no edema bilateral lower extremities    Abdomen:  Soft, nontender, nondistended, bowel sounds present   Extremities: no cyanosis or clubbing   Skin: warm, dry.    Neurologic: mood and affect are appropriate, alert and oriented x3      Enc Vitals  BP: (!) 158/80  Pulse: 88  Resp: 16  Weight: 105.7 kg (233 lb)                                           Medical History  Surgical History Family History Social History   Past Medical History:   Diagnosis Date     Atrial fibrillation (H)      Coronary artery disease 24Jan2018     Diabetes mellitus (H) 01/2005    adult onset     GERD (gastroesophageal reflux disease)      High cholesterol      Hyperlipidemia 2008     Hypertension 2006     Kidney stones 2012     Liver disease      Myocardial infarction (H)      Neuropathy of foot 07/16/2014     Obesity      DEWAYNE on CPAP      Sleep apnea      Uncontrolled type 2 diabetes mellitus with other diabetic kidney complication, unspecified long term insulin use status     Created by Conversion      Unspecified hereditary and idiopathic peripheral neuropathy     Created by Conversion     Past Surgical History:   Procedure Laterality Date     ARTHROSCOPY KNEE Left 8/6/15    Partial medial meniscectoy, [facility]     C LAP,CHOLECYSTECTOMY/EXPLORE      Description: Cholecystectomy Laparoscopic;  Proc Date: 12/11/2013;     CARDIAC CATHETERIZATION  01/24/2017 [facility]     CORONARY STENT PLACEMENT       HC AMPUTATE METACARPAL+FINGER      Description: Metacarpal Amputation And Index Finger;  Recorded: 08/15/2014;  Comments: For \"cartilage cancer\"     HC PERCUT NEPHROSTOLITHOTOMY W W/O DILATION <=2 CM      Description: Percutaneous Lithotomy;  Proc Date: 11/05/2012;     KIDNEY STONE SURGERY Left 02/05/2018    stent placement, lithotripsy, nephrolithotomy     LEG SURGERY       OTHER SURGICAL HISTORY Left 07/11/2014    Percutaneous Lithotomy      [place] CATH PLACEMENT & NJX CORONARY ART ANGIO IMG " S&I N/A 1/24/2017    Procedure: Coronary Angiogram;  Surgeon: Melissa Freeman MD;  Location: [facility] Cath Lab;  Service: Cardiology     [place] L HRT CATH W/NJX L VENTRICULOGRAPHY IMG S&I N/A 1/24/2017    Procedure: Left Heart Catheterization with Left Ventriculogram;  Surgeon: Melissa Freeman MD;  Location: [facility] Cath Lab;  Service: Cardiology     RELEASE CARPAL TUNNEL Right 04/12/2016    No family history on file. Social History     Socioeconomic History     Marital status:      Spouse name: Not on file     Number of children: Not on file     Years of education: 13     Highest education level: Not on file   Occupational History     Not on file   Tobacco Use     Smoking status: Former Smoker     Packs/day: 0.50     Smokeless tobacco: Never Used     Tobacco comment: Patient reports he is trying to quit.   Substance and Sexual Activity     Alcohol use: Yes     Drug use: No     Sexual activity: Not on file   Other Topics Concern     Not on file   Social History Narrative    Wife of 21 years passed away on 10/15/2017. Had 8 kids with her - 3 of hers from a previous marriage, 3 of his from a previous marriage, and 2 kids they adopted together. He has 20 grandchildren.      Social Determinants of Health     Financial Resource Strain:      Difficulty of Paying Living Expenses:    Food Insecurity:      Worried About Running Out of Food in the Last Year:      Ran Out of Food in the Last Year:    Transportation Needs:      Lack of Transportation (Medical):      Lack of Transportation (Non-Medical):    Physical Activity:      Days of Exercise per Week:      Minutes of Exercise per Session:    Stress:      Feeling of Stress :    Social Connections:      Frequency of Communication with Friends and Family:      Frequency of Social Gatherings with Friends and Family:      Attends Scientologist Services:      Active Member of Clubs or Organizations:      Attends Club or Organization Meetings:      Marital Status:     Intimate Partner Violence:      Fear of Current or Ex-Partner:      Emotionally Abused:      Physically Abused:      Sexually Abused:           Medications  Allergies   Current Outpatient Medications   Medication Sig Dispense Refill     albuterol (PROAIR RESPICLICK) 108 (90 Base) MCG/ACT inhaler Inhale 2 puffs into the lungs as needed       AMLODIPINE BESYLATE PO Take 5 mg by mouth daily       apixaban ANTICOAGULANT (ELIQUIS) 5 MG tablet Take 5 mg by mouth 2 times daily       ATORVASTATIN CALCIUM PO Take 80 mg by mouth daily       calcium-vitamin D 500-125 MG-UNIT TABS Take by mouth daily       Clopidogrel Bisulfate (PLAVIX PO) Take 75 mg by mouth daily       insulin aspart (NOVOLOG FLEXPEN) 100 UNIT/ML injection Inject 35 Units Subcutaneous 2 times daily (with meals)       insulin glargine (LANTUS) 100 UNIT/ML injection Inject 60 Units Subcutaneous 2 times daily       LISINOPRIL PO Take 40 mg by mouth daily       METFORMIN HCL PO Take 1,000 mg by mouth 2 times daily (with meals)       METOPROLOL SUCCINATE ER PO Take 50 mg by mouth 2 times daily        minocycline (MINOCIN) 100 MG capsule Take 100 mg by mouth 2 times daily as needed       Parenteral Therapy Supplies (UNIVERSAL SYRINGE TIP ADAPTOR) MISC 3 each       potassium citrate (UROCIT-K) 5 MEQ (540 MG) CR tablet Take 30 mEq by mouth 2 times daily       semaglutide (OZEMPIC) 2 MG/1.5ML SOPN pen Inject 1 mg Subcutaneous once a week       study drug pemafibrate 0.2 mg vs placebo, PROMINENT, 0.2 mg TABS tablet Take 0.2 mg by mouth 2 times daily       TAMSULOSIN HCL PO Take 0.4 mg by mouth daily       testosterone cypionate (DEPOTESTOSTERONE) 200 MG/ML injection Inject 200 mg into the muscle every 14 days       cyanocobalamin (VITAMIN B-12) 1000 MCG tablet Take 1,000 mcg by mouth daily      Allergies   Allergen Reactions     Gabapentin Swelling     Of face and hands     Nsaids      Pt is on Plavix

## 2021-11-04 NOTE — LETTER
"11/4/2021    Jovita Nunez  [facility]   [facility] [address]    RE: Man Acevedo Sr.       Dear Colleague,    I had the pleasure of seeing Man Acevedo Sr. in the [facility].  HEART CARE RESEARCH NOTE      [facility]  [phone]      Assessment/Recommendations   No changes to his medications today. He will continue to follow-up with research per protocol for Prominent study.  I [facility].        History of Present Illness/Subjective    Man JOSEPH Acevedo Sr. is seen at [facility] for Prominent research study.  He has a history of coronary artery disease with most recent MI in April 2021, atrial fibrillation, hypertension, diabetes, dyslipidemia, and renal cancer.  [facility].  Over the past few months, he notes increased \"racing heart rates.\"  His metoprolol has been [facility].  He is being seen there today.  He notes improvement since increasing metoprolol last week.  He has obstructive sleep apnea but stopped using his CPAP a few months ago due to CPAP recall.  This may be a trigger for his A. fib.  He denies lightheadedness, shortness of breath, dyspnea on exertion, chest pain and lower extremity edema.      Patient Active Problem List   Diagnosis     Peripheral Neuropathy     Nonalcoholic Fatty Liver Disease     Lower Back Pain     Adjustment Disorder With Anxiety And Depressed Mood     Neck Pain     Dyslipidemia     Pain in joint, ankle and foot     Obstructive sleep apnea     Right carpal tunnel syndrome     Left hand weakness     Tennis elbow     Benign essential hypertension     Obesity due to excess calories     Coronary artery disease involving native coronary artery of native heart without angina pectoris     NSTEMI (non-ST elevated myocardial infarction) (H)     Tobacco use     " "DM (diabetes mellitus) (H)     Paroxysmal atrial fibrillation (H)       Past Medical History:   Diagnosis Date     Atrial fibrillation (H)      Coronary artery disease 24Jan2018     Diabetes mellitus (H) 01/2005    adult onset     GERD (gastroesophageal reflux disease)      High cholesterol      Hyperlipidemia 2008     Hypertension 2006     Kidney stones 2012     Liver disease      Myocardial infarction (H)      Neuropathy of foot 07/16/2014     Obesity      DEWAYNE on CPAP      Sleep apnea      Uncontrolled type 2 diabetes mellitus with other diabetic kidney complication, unspecified long term insulin use status     Created by Conversion      Unspecified hereditary and idiopathic peripheral neuropathy     Created by Conversion        Past Surgical History:   Procedure Laterality Date     ARTHROSCOPY KNEE Left 8/6/15    Partial medial meniscectoy, foreign body [facility]     C LAP,CHOLECYSTECTOMY/EXPLORE      Description: Cholecystectomy Laparoscopic;  Proc Date: 12/11/2013;     CARDIAC CATHETERIZATION  01/24/2017 [facility]     CORONARY STENT PLACEMENT       HC AMPUTATE METACARPAL+FINGER      Description: Metacarpal Amputation And Index Finger;  Recorded: 08/15/2014;  Comments: For \"cartilage cancer\"     HC PERCUT NEPHROSTOLITHOTOMY W W/O DILATION <=2 CM      Description: Percutaneous Lithotomy;  Proc Date: 11/05/2012;     KIDNEY STONE SURGERY Left 02/05/2018    stent placement, lithotripsy, nephrolithotomy     LEG SURGERY       OTHER SURGICAL HISTORY Left 07/11/2014    Percutaneous Lithotomy      [place] CATH PLACEMENT & NJX CORONARY ART ANGIO IMG S&I N/A 1/24/2017    Procedure: Coronary Angiogram;  Surgeon: Melissa Freeman MD;  Location: [facility];  Service: Cardiology     [place] L HRT CATH W/NJX L VENTRICULOGRAPHY IMG S&I N/A 1/24/2017    Procedure: Left Heart Catheterization with Left Ventriculogram;  Surgeon: Melissa Freeman MD;  Location: [facility];  Service: Cardiology "     RELEASE CARPAL TUNNEL Right 04/12/2016       No family history on file.    Social History     Socioeconomic History     Marital status:      Spouse name: Not on file     Number of children: Not on file     Years of education: 13     Highest education level: Not on file   Occupational History     Not on file   Tobacco Use     Smoking status: Former Smoker     Packs/day: 0.50     Smokeless tobacco: Never Used     Tobacco comment: Patient reports he is trying to quit.   Substance and Sexual Activity     Alcohol use: Yes     Drug use: No     Sexual activity: Not on file   Other Topics Concern     Not on file   Social History Narrative    Wife of 21 years passed away on 10/15/2017. Had 8 kids with her - 3 of hers from a previous marriage, 3 of his from a previous marriage, and 2 kids they adopted together. He has 20 grandchildren.      Social Determinants of Health     Financial Resource Strain:      Difficulty of Paying Living Expenses:    Food Insecurity:      Worried About Running Out of Food in the Last Year:      Ran Out of Food in the Last Year:    Transportation Needs:      Lack of Transportation (Medical):      Lack of Transportation (Non-Medical):    Physical Activity:      Days of Exercise per Week:      Minutes of Exercise per Session:    Stress:      Feeling of Stress :    Social Connections:      Frequency of Communication with Friends and Family:      Frequency of Social Gatherings with Friends and Family:      Attends Adventism Services:      Active Member of Clubs or Organizations:      Attends Club or Organization Meetings:      Marital Status:    Intimate Partner Violence:      Fear of Current or Ex-Partner:      Emotionally Abused:      Physically Abused:      Sexually Abused:        Current Outpatient Medications   Medication Sig Dispense Refill     albuterol (PROAIR RESPICLICK) 108 (90 Base) MCG/ACT inhaler Inhale 2 puffs into the lungs as needed       AMLODIPINE BESYLATE PO Take 5 mg by  mouth daily       apixaban ANTICOAGULANT (ELIQUIS) 5 MG tablet Take 5 mg by mouth 2 times daily       ATORVASTATIN CALCIUM PO Take 80 mg by mouth daily       calcium-vitamin D 500-125 MG-UNIT TABS Take by mouth daily       Clopidogrel Bisulfate (PLAVIX PO) Take 75 mg by mouth daily       insulin aspart (NOVOLOG FLEXPEN) 100 UNIT/ML injection Inject 35 Units Subcutaneous 2 times daily (with meals)       insulin glargine (LANTUS) 100 UNIT/ML injection Inject 60 Units Subcutaneous 2 times daily       LISINOPRIL PO Take 40 mg by mouth daily       METFORMIN HCL PO Take 1,000 mg by mouth 2 times daily (with meals)       METOPROLOL SUCCINATE ER PO Take 50 mg by mouth 2 times daily        minocycline (MINOCIN) 100 MG capsule Take 100 mg by mouth 2 times daily as needed       Parenteral Therapy Supplies (UNIVERSAL SYRINGE TIP ADAPTOR) MISC 3 each       potassium citrate (UROCIT-K) 5 MEQ (540 MG) CR tablet Take 30 mEq by mouth 2 times daily       semaglutide (OZEMPIC) 2 MG/1.5ML SOPN pen Inject 1 mg Subcutaneous once a week       study drug pemafibrate 0.2 mg vs placebo, PROMINENT, 0.2 mg TABS tablet Take 0.2 mg by mouth 2 times daily       TAMSULOSIN HCL PO Take 0.4 mg by mouth daily       testosterone cypionate (DEPOTESTOSTERONE) 200 MG/ML injection Inject 200 mg into the muscle every 14 days       cyanocobalamin (VITAMIN B-12) 1000 MCG tablet Take 1,000 mcg by mouth daily         Allergies   Allergen Reactions     Gabapentin Swelling     Of face and hands     Nsaids      Pt is on Plavix        Physical Examination Review of Systems   BP (!) 158/80 (BP Location: Left arm, Patient Position: Sitting, Cuff Size: Adult Large)   Pulse 88   Resp 16   Wt 105.7 kg (233 lb)   BMI 32.50 kg/m    Body mass index is 32.5 kg/m .  Wt Readings from Last 3 Encounters:   11/04/21 105.7 kg (233 lb)   06/17/21 107 kg (236 lb)   02/23/21 109.8 kg (242 lb)       General Appearance:     Alert, cooperative and in no acute distress.  "  ENT/Mouth: membranes moist, no oral lesions or bleeding gums.      EYES:  no scleral icterus, normal conjunctivae   Chest/Lungs:   lungs are clear to auscultation, no rales or wheezing, respirations unlabored   Cardiovascular:   Regular. Normal first and second heart sounds, no edema bilateral lower extremities    Abdomen:  Soft, nontender, nondistended, bowel sounds present   Extremities: no cyanosis or clubbing   Skin: warm, dry.    Neurologic: mood and affect are appropriate, alert and oriented x3      Enc Vitals  BP: (!) 158/80  Pulse: 88  Resp: 16  Weight: 105.7 kg (233 lb)                                           Medical History  Surgical History Family History Social History   Past Medical History:   Diagnosis Date     Atrial fibrillation (H)      Coronary artery disease 24Jan2018     Diabetes mellitus (H) 01/2005    adult onset     GERD (gastroesophageal reflux disease)      High cholesterol      Hyperlipidemia 2008     Hypertension 2006     Kidney stones 2012     Liver disease      Myocardial infarction (H)      Neuropathy of foot 07/16/2014     Obesity      DEWAYNE on CPAP      Sleep apnea      Uncontrolled type 2 diabetes mellitus with other diabetic kidney complication, unspecified long term insulin use status     Created by Conversion      Unspecified hereditary and idiopathic peripheral neuropathy     Created by Conversion     Past Surgical History:   Procedure Laterality Date     ARTHROSCOPY KNEE Left 8/6/15    Partial medial meniscectoy, [facility]     C LAP,CHOLECYSTECTOMY/EXPLORE      Description: Cholecystectomy Laparoscopic;  Proc Date: 12/11/2013;     CARDIAC CATHETERIZATION  01/24/2017 [facility]     CORONARY STENT PLACEMENT       HC AMPUTATE METACARPAL+FINGER      Description: Metacarpal Amputation And Index Finger;  Recorded: 08/15/2014;  Comments: For \"cartilage cancer\"     HC PERCUT NEPHROSTOLITHOTOMY W W/O DILATION <=2 CM      Description: " Percutaneous Lithotomy;  Proc Date: 11/05/2012;     KIDNEY STONE SURGERY Left 02/05/2018    stent placement, lithotripsy, nephrolithotomy     LEG SURGERY       OTHER SURGICAL HISTORY Left 07/11/2014    Percutaneous Lithotomy      [place] [facility] [place]/24[place]    Procedure: Coronary Angiogram;  Surgeon: Melissa Freeman MD;  Location: [facility];  Service: Cardiology     [place] L HRT CATH W/NJX L VENTRICULOGRAPHY IM S&I N/A 1/24/2017    Procedure: Left Heart Catheterization with Left Ventriculogram;  Surgeon: Melissa Freeman MD;  Location: [facility];  Service: Cardiology     RELEASE CARPAL TUNNEL Right 04/12/2016    No family history on file. Social History     Socioeconomic History     Marital status:      Spouse name: Not on file     Number of children: Not on file     Years of education: 13     Highest education level: Not on file   Occupational History     Not on file   Tobacco Use     Smoking status: Former Smoker     Packs/day: 0.50     Smokeless tobacco: Never Used     Tobacco comment: Patient reports he is trying to quit.   Substance and Sexual Activity     Alcohol use: Yes     Drug use: No     Sexual activity: Not on file   Other Topics Concern     Not on file   Social History Narrative    Wife of 21 years passed away on 10/15/2017. Had 8 kids with her - 3 of hers from a previous marriage, 3 of his from a previous marriage, and 2 kids they adopted together. He has 20 grandchildren.      Social Determinants of Health     Financial Resource Strain:      Difficulty of Paying Living Expenses:    Food Insecurity:      Worried About Running Out of Food in the Last Year:      Ran Out of Food in the Last Year:    Transportation Needs:      Lack of Transportation (Medical):      Lack of Transportation (Non-Medical):    Physical Activity:      Days of Exercise per Week:      Minutes of Exercise per Session:    Stress:      Feeling of Stress :    Social  Connections:      Frequency of Communication with Friends and Family:      Frequency of Social Gatherings with Friends and Family:      Attends Gnosticist Services:      Active Member of Clubs or Organizations:      Attends Club or Organization Meetings:      Marital Status:    Intimate Partner Violence:      Fear of Current or Ex-Partner:      Emotionally Abused:      Physically Abused:      Sexually Abused:           Medications  Allergies   Current Outpatient Medications   Medication Sig Dispense Refill     albuterol (PROAIR RESPICLICK) 108 (90 Base) MCG/ACT inhaler Inhale 2 puffs into the lungs as needed       AMLODIPINE BESYLATE PO Take 5 mg by mouth daily       apixaban ANTICOAGULANT (ELIQUIS) 5 MG tablet Take 5 mg by mouth 2 times daily       ATORVASTATIN CALCIUM PO Take 80 mg by mouth daily       calcium-vitamin D 500-125 MG-UNIT TABS Take by mouth daily       Clopidogrel Bisulfate (PLAVIX PO) Take 75 mg by mouth daily       insulin aspart (NOVOLOG FLEXPEN) 100 UNIT/ML injection Inject 35 Units Subcutaneous 2 times daily (with meals)       insulin glargine (LANTUS) 100 UNIT/ML injection Inject 60 Units Subcutaneous 2 times daily       LISINOPRIL PO Take 40 mg by mouth daily       METFORMIN HCL PO Take 1,000 mg by mouth 2 times daily (with meals)       METOPROLOL SUCCINATE ER PO Take 50 mg by mouth 2 times daily        minocycline (MINOCIN) 100 MG capsule Take 100 mg by mouth 2 times daily as needed       Parenteral Therapy Supplies (UNIVERSAL SYRINGE TIP ADAPTOR) MISC 3 each       potassium citrate (UROCIT-K) 5 MEQ (540 MG) CR tablet Take 30 mEq by mouth 2 times daily       semaglutide (OZEMPIC) 2 MG/1.5ML SOPN pen Inject 1 mg Subcutaneous once a week       study drug pemafibrate 0.2 mg vs placebo, PROMINENT, 0.2 mg TABS tablet Take 0.2 mg by mouth 2 times daily       TAMSULOSIN HCL PO Take 0.4 mg by mouth daily       testosterone cypionate (DEPOTESTOSTERONE) 200 MG/ML injection Inject 200 mg into the muscle  every 14 days       cyanocobalamin (VITAMIN B-12) 1000 MCG tablet Take 1,000 mcg by mouth daily      Allergies   Allergen Reactions     Gabapentin Swelling     Of face and hands     Nsaids      Pt is on Plavix

## 2021-12-16 ENCOUNTER — TELEPHONE (OUTPATIENT)
Dept: CARDIOLOGY | Facility: CLINIC | Age: 61
End: 2021-12-16
Payer: COMMERCIAL

## 2021-12-16 NOTE — TELEPHONE ENCOUNTER
ACC Quality Measurement  Hypertension      Last three blood pressure readings:  BP Readings from Last 3 Encounters:   11/04/21 (!) 158/80   06/17/21 124/80   02/23/21 133/77       Contact:   First attempts to contact on:  Spoke to patient .   Second attempt to contact on: 1/17/22 Spoke to pt. He will check b/p when able and call back with reading.    Pt called back with a b/p reading of 155/92    Follow-up Action:   Received Home BP new reading is out of range per MNCM. Recorded new BP reading into epic and forwarded TE to Nurse.

## 2022-01-04 ENCOUNTER — TELEPHONE (OUTPATIENT)
Dept: CARDIOLOGY | Facility: CLINIC | Age: 62
End: 2022-01-04
Payer: COMMERCIAL

## 2022-01-04 NOTE — TELEPHONE ENCOUNTER
Pemafibrate to Reduce cardiovascular OutcoMes by reducing triglycerides IN patiENts with diabeTes (PROMINENT) clinical trial.    Study telephone visit form:    Subject Number:   [id number]                                     Dr. Barber- PI      CONCOMITANT MEDICATIONS  ? Investigator to report if participant needs treatment with prohibited medications (Fibrates or other agents with PPAR-? agonist activity (e.g., saroglitazar); See protocol for exclusionary medications and actions to be taken.    VISIT SCHEDULING AND CONTACT CONFIRMATION  ? Confirm date of next study visit   ? Confirm information on Subject Information Form or update as needed      Visit Number:__[id number]_____  Visit Date: 22      Was the subject, relatives or health care provider (as per consent) contacted by phone?    [x] Yes  [] No    Yes [x]  No []   If Yes, Date of Contact:   Date: ___22_ ____ ____   DD [facility] YYYY  If No, please document attempts to contact subject (include date and type of contact)   Date: ____ ____ ____   Type: [] Tel _ [] Other___  [] 2 Date:      Name of Person: Contacted:   _____Self/Subject_______________   Relationship to Subject:   ______________________  Type:[]  Tel[x]  Other _______   Date: ____ _22___ ____   Type: [] Tel [] Other _______    Subject Status on the day assessed     [x] Subject alive  []  Subject    [] Unknown at present Please complete  Death Details Form in InForm and submit source documents to [facility]        Review Subject Contact Information Form and update as needed   [x]  Review concomitant medication use   [x]  Query participant and record any reported AEs .  Reports BP is still high, instructed on resting prior and taking mx BP at home, to follow with [facility].  No changes in meds or health otherwise.  [x] Query participant and record any CV efficacy events   [x] Confirm next study visit: _____________   [x] Instruct participants to bring all study supplies with them to the next  in-person visit    Instruct participants to bring all study supplies with them to the next in-person visit     Presley Torres RN  Clinical Research Nurse  [phone]

## 2022-01-26 ENCOUNTER — DOCUMENTATION ONLY (OUTPATIENT)
Dept: CARDIOLOGY | Facility: CLINIC | Age: 62
End: 2022-01-26
Payer: COMMERCIAL

## 2022-01-26 NOTE — TELEPHONE ENCOUNTER
Called patient who reports that his average BP is around 140-150 systolic. He is due for follow up with [facility]. msg to schedulers to please arrange as most of his appts are related to research.-elizabeth

## 2022-01-26 NOTE — PROGRESS NOTES
Pemafibrate to Reduce cardiovascular OutcoMes by reducing triglycerides IN patiENts with diabeTes (PROMINENT) clinical trial.    ADVERSE EVENT Description:  Subject noted left shoulder pain, went to [facility]  ED and had xray, diagnostics and found to likely be muscle related. Subject was lifting 200lb propane tank days prior.  Told to take tylenol and follow up if does not resolve.  Subject did not report any pain or issues at follow up appointment week after.    Adverse Event:  Left scapular muskuloskelatal pain  Serious:  [] Yes   [x] No  Reportable to IRB:   [] Yes   [x] No  Severity (mild/moderate/severe): moderate   Date of Awareness: 1/26/22  Start Date: 10/24/21  End Date: 11/4/21  Action taken with study treatment:  [x] Dose Not Changed    Outcome:   []Not Recovered/Not Resolved  [x]Recovered/Resolved    Medication or therapies taken:  [x] Yes   Tylenol 100mg prn for pain    Dr. Barber: Please assign causality of above AE  Related to study drug?     [] Yes   [x] No

## 2022-02-10 NOTE — TELEPHONE ENCOUNTER
Unable to schedule f/u with [facility] due to having [employer] insurance per scheduling team. Stop b/p monitoring per Dr Barber's nurse Yue RN

## 2022-03-07 ENCOUNTER — TELEPHONE (OUTPATIENT)
Dept: CARDIOLOGY | Facility: CLINIC | Age: 62
End: 2022-03-07
Payer: COMMERCIAL

## 2022-03-07 NOTE — TELEPHONE ENCOUNTER
On 30Dec2021 writer called subject and scheduled V31 for 17Feb2022. Subject called writer on 10Feb2022 looking to reschedule V31 due to travels. Writer informed the subject and CRC that the visit will be out of the protocol window. Writer scheduled V31 with CRC and [facility] for 17Mar2022. Both CRC and [facility] had open availability on calendar. Writer was informed on 04Mar2022 that subject would like to reschedule 14Mar2022. Write was unaware that CRC had rescheduled the [facility] and CRC visit from 17Mar2022 to 14Mar2022. No notes were provided in the rescheduling tracker. In a correspondence from CRC to writer, writer was infromed subject would like to reschedule visit with CRC and [facility] from 14Mar2022 to 10Mar2022 or 11Mar2022. Due to [facility] and [facility] schedule availability this visit would have to be pushed to 29Mar2022. Writer callled subject on 07Mar2022 to see subjects availability for 29Mar2022, subject is only available on Thursdays and Fridays. Writer confirmed with subject that [id number] will work. CRC and clinical  have been informed of this visit being out of window.

## 2022-03-31 ENCOUNTER — OFFICE VISIT (OUTPATIENT)
Dept: CARDIOLOGY | Facility: CLINIC | Age: 62
End: 2022-03-31

## 2022-03-31 VITALS
BODY MASS INDEX: 31.42 KG/M2 | HEIGHT: 71 IN | WEIGHT: 224.4 LBS | RESPIRATION RATE: 16 BRPM | HEART RATE: 76 BPM | SYSTOLIC BLOOD PRESSURE: 122 MMHG | BODY MASS INDEX: 31.42 KG/M2 | RESPIRATION RATE: 16 BRPM | DIASTOLIC BLOOD PRESSURE: 70 MMHG | HEART RATE: 76 BPM | HEIGHT: 71 IN | WEIGHT: 224.4 LBS | DIASTOLIC BLOOD PRESSURE: 70 MMHG | SYSTOLIC BLOOD PRESSURE: 122 MMHG

## 2022-03-31 DIAGNOSIS — I10 BENIGN ESSENTIAL HYPERTENSION: Chronic | ICD-10-CM

## 2022-03-31 DIAGNOSIS — I25.10 CORONARY ARTERY DISEASE INVOLVING NATIVE CORONARY ARTERY OF NATIVE HEART WITHOUT ANGINA PECTORIS: ICD-10-CM

## 2022-03-31 DIAGNOSIS — E78.5 DYSLIPIDEMIA: Primary | ICD-10-CM

## 2022-03-31 DIAGNOSIS — E78.5 HYPERLIPIDEMIA LDL GOAL <70: Primary | ICD-10-CM

## 2022-03-31 DIAGNOSIS — E78.5 DYSLIPIDEMIA: Primary | Chronic | ICD-10-CM

## 2022-03-31 PROCEDURE — 99213 OFFICE O/P EST LOW 20 MIN: CPT | Performed by: NURSE PRACTITIONER

## 2022-03-31 PROCEDURE — 99207 PR NO CHARGE-RESEARCH SERVICE: CPT

## 2022-03-31 RX ORDER — LEVETIRACETAM 500 MG/1
500 TABLET ORAL 2 TIMES DAILY
COMMUNITY
Start: 2022-03-07 | End: 2022-05-20

## 2022-03-31 RX ORDER — TRETINOIN 0.25 MG/G
CREAM TOPICAL
COMMUNITY
Start: 2021-11-24

## 2022-03-31 RX ORDER — CYCLOBENZAPRINE HCL 10 MG
TABLET ORAL
COMMUNITY
Start: 2022-03-09 | End: 2022-05-20

## 2022-03-31 RX ORDER — CLINDAMYCIN PHOSPHATE 10 UG/ML
LOTION TOPICAL
COMMUNITY
Start: 2021-11-24

## 2022-03-31 NOTE — LETTER
3/31/2022    Jovita Nunez  [facility] [address] [place] [address]    RE: Man Acevedo Sr.       Dear Colleague,     I had the pleasure of seeing Man RUIZ Curtis Geiger in the [facility].  Assessment/Plan:     No changes to his medications today. He will continue to follow-up with research per protocol for prominent study.  [facility]      Subjective:     Man JOSEPH Acevedo Sr. is seen at [facility] today for prominent study.  He fell on ice in early March and had bleeding in his head.  [facility]  He is currently off of clopidogrel and Eliquis because of this.  He is seeing a neurosurgeon within the next 2 weeks.  He got coronary stents on April 28, 2021.  I discussed with him that sometimes he comes off of clopidogrel a year after stent(s) and should ask about this when he has medical care visit there.   ( I cannot find cor angio report in EHR.)  He is only following here for this study.    Patient Active Problem List   Diagnosis     Peripheral Neuropathy     Nonalcoholic Fatty Liver Disease     Lower Back Pain     Adjustment Disorder With Anxiety And Depressed Mood     Neck Pain     Dyslipidemia     Pain in joint, ankle and foot     Obstructive sleep apnea     Right carpal tunnel syndrome     Left hand weakness     Tennis elbow     Benign essential hypertension     Obesity due to excess calories     Coronary artery disease involving native coronary artery of native heart without angina pectoris     NSTEMI (non-ST elevated myocardial infarction) (H)     Tobacco use     DM (diabetes mellitus) (H)     Paroxysmal atrial fibrillation (H)       Past Medical History:   Diagnosis Date     Atrial fibrillation (H)      Coronary artery disease 24Jan2018     Diabetes mellitus (H) 01/2005    adult onset     GERD (gastroesophageal reflux disease)      High cholesterol      Hyperlipidemia 2008     Hypertension 2006     Kidney  "stones 2012     Liver disease      Myocardial infarction (H)      Neuropathy of foot 07/16/2014     Obesity      DEWAYNE on CPAP      Sleep apnea      Uncontrolled type 2 diabetes mellitus with other diabetic kidney complication, unspecified long term insulin use status     Created by Conversion      Unspecified hereditary and idiopathic peripheral neuropathy     Created by Conversion        Past Surgical History:   Procedure Laterality Date     ARTHROSCOPY KNEE Left 8/6/15    Partial medial meniscectoy, [facility]     CARDIAC CATHETERIZATION  01/24/2017 [facility]     CORONARY STENT PLACEMENT       HC PERCUT NEPHROSTOLITHOTOMY W W/O DILATION <=2 CM      Description: Percutaneous Lithotomy;  Proc Date: 11/05/2012;     KIDNEY STONE SURGERY Left 02/05/2018    stent placement, lithotripsy, nephrolithotomy     LEG SURGERY       OTHER SURGICAL HISTORY Left 07/11/2014    Percutaneous Lithotomy      NE CATH PLACEMENT & NJX CORONARY ART ANGIO IMG S&I N/A 1/24/2017    Procedure: Coronary Angiogram;  Surgeon: Melissa Freeman MD;  Location: [facility] Cath Lab;  Service: Cardiology     NE L HRT CATH W/NJX L VENTRICULOGRAPHY IMG S&I N/A 1/24/2017    Procedure: Left Heart Catheterization with Left Ventriculogram;  Surgeon: Melissa Freeman MD;  Location: [facility] Cath Lab;  Service: Cardiology     RELEASE CARPAL TUNNEL Right 04/12/2016     ZZC LAP,CHOLECYSTECTOMY/EXPLORE      Description: Cholecystectomy Laparoscopic;  Proc Date: 12/11/2013;     ZZ AMPUTATE METACARPAL+FINGER      Description: Metacarpal Amputation And Index Finger;  Recorded: 08/15/2014;  Comments: For \"cartilage cancer\"       No family history on file.    Social History     Socioeconomic History     Marital status:      Spouse name: Not on file     Number of children: Not on file     Years of education: 13     Highest education level: Not on file   Occupational History     Not on file   Tobacco Use     Smoking " status: Former Smoker     Packs/day: 0.50     Smokeless tobacco: Never Used     Tobacco comment: Patient reports he is trying to quit.   Substance and Sexual Activity     Alcohol use: Yes     Drug use: No     Sexual activity: Not on file   Other Topics Concern     Not on file   Social History Narrative    Wife of 21 years passed away on 10/15/2017. Had 8 kids with her - 3 of hers from a previous marriage, 3 of his from a previous marriage, and 2 kids they adopted together. He has 20 grandchildren.      Social Determinants of Health     Financial Resource Strain: Not on file   Food Insecurity: Not on file   Transportation Needs: Not on file   Physical Activity: Not on file   Stress: Not on file   Social Connections: Not on file   Intimate Partner Violence: Not on file   Housing Stability: Not on file       Current Outpatient Medications   Medication Sig Dispense Refill     albuterol (PROAIR RESPICLICK) 108 (90 Base) MCG/ACT inhaler Inhale 2 puffs into the lungs as needed       AMLODIPINE BESYLATE PO Take 5 mg by mouth daily       apixaban ANTICOAGULANT (ELIQUIS) 5 MG tablet Take 5 mg by mouth 2 times daily (Patient not taking: Reported on 3/31/2022)       ATORVASTATIN CALCIUM PO Take 80 mg by mouth daily       calcium-vitamin D 500-125 MG-UNIT TABS Take 1 tablet by mouth daily        clindamycin (CLEOCIN T) 1 % external lotion APPLY TO AFFECTED AREA TOPICALLY TWICE A DAY       Clopidogrel Bisulfate (PLAVIX PO) Take 75 mg by mouth daily (Patient not taking: Reported on 3/31/2022)       cyanocobalamin (VITAMIN B-12) 1000 MCG tablet Take 1,000 mcg by mouth daily       cyclobenzaprine (FLEXERIL) 10 MG tablet TAKE ONE-HALF TABLET BY MOUTH THREE TIMES A DAY AS NEEDED FOR PAIN AND MUSCLE SPASMS       glucose (BD GLUCOSE) 4 g chewable tablet CHEW FOUR TABLETS BY MOUTH  AS NEEDED FOR LOW BLOOD SUGAR REACTION *RETEST BLOOD SUGAR AFTER 15 MINUTES (USE THE 15-15 RULE TO TREAT)       insulin aspart (NOVOLOG FLEXPEN) 100 UNIT/ML  "injection Inject 22 Units Subcutaneous 2 times daily (with meals)        insulin glargine (LANTUS) 100 UNIT/ML injection Inject 40 Units Subcutaneous 2 times daily        Liniments (VAPORIZING CREAM) 4.7-1.2-2.6 % CREA APPLY THIN LAYER TOPICALLY FOUR TIMES A DAY AS NEEDED FOR NECK PAIN       LISINOPRIL PO Take 40 mg by mouth daily       METFORMIN HCL PO Take 1,000 mg by mouth 2 times daily (with meals)       METOPROLOL SUCCINATE ER PO Take 50 mg by mouth 2 times daily        minocycline (MINOCIN) 100 MG capsule Take 100 mg by mouth 2 times daily as needed       omeprazole (PRILOSEC) 20 MG DR capsule TAKE ONE CAPSULE BY MOUTH EVERY DAY TO DECREASE STOMACH ACID -TAKE ON AN EMPTY STOMACH, AT LEAST 30 MINUTES BEFORE A MEAL       Parenteral Therapy Supplies (UNIVERSAL SYRINGE TIP ADAPTOR) MISC 3 each       potassium citrate (UROCIT-K) 5 MEQ (540 MG) CR tablet Take 30 mEq by mouth 2 times daily       semaglutide (OZEMPIC) 2 MG/1.5ML SOPN pen Inject 1 mg Subcutaneous once a week       study drug pemafibrate 0.2 mg vs placebo, PROMINENT, 0.2 mg TABS tablet Take 0.2 mg by mouth 2 times daily       TAMSULOSIN HCL PO Take 0.4 mg by mouth daily       testosterone cypionate (DEPOTESTOSTERONE) 200 MG/ML injection Inject 200 mg into the muscle every 14 days       tretinoin (RETIN-A) 0.025 % external cream APPLY SPARINGLY TO FACE TOPICALLY EVERY THIRD DAY FOR 2 WEEKS, THEN APPLY SPARINGLY TO FACE EVERY OTHER DAY AT BEDTIME FOR 2 WEEKS, THEN APPLY SPARINGLY TO FACE AT BEDTIME       levETIRAcetam (KEPPRA) 500 MG tablet Take 500 mg by mouth 2 times daily (Patient not taking: Reported on 3/31/2022)         Allergies   Allergen Reactions     Gabapentin Swelling     Of face and hands     Nsaids      Pt is on Plavix       Objective:     /70 (BP Location: Right arm, Patient Position: Sitting, Cuff Size: Adult Large)   Pulse 76   Resp 16   Ht 1.803 m (5' 11\")   Wt 101.8 kg (224 lb 6.4 oz)   BMI 31.30 kg/m    Wt Readings from Last " 3 Encounters:   03/31/22 101.8 kg (224 lb 6.4 oz)   03/31/22 101.8 kg (224 lb 6.4 oz)   11/04/21 105.7 kg (233 lb)       General Appearance:   Alert, cooperative and in no acute distress.   HEENT:  No scleral icterus; the mucous membranes were pink and moist. Cervical lymph nodes nontender and nonpalpable.   Neck: JVP normal. No HJR.   Chest: The spine was straight. The chest was symmetric.   Lungs:   Respirations unlabored; the lungs are clear to auscultation.   Cardiovascular:   Regular rhythm. S1 and S2 without murmur, clicks or rubs. Radial, carotid and posterior tibial pulses are intact and symmetrical.  No carotid bruits noted   Abdomen:  Soft, nontender, nondistended, bowel sounds present   Extremities: No cyanosis, clubbing, or edema.   Skin: No bruising or wounds   Neurologic: Mood and affect are appropriate.             Ольга Caputo RN, CNP  [facility] cardiology office    Thank you for allowing me to participate in the care of your patient.      Sincerely,     JEANETTE Kern CNP [facility]  cc:   No referring provider defined for this encounter.

## 2022-03-31 NOTE — PROGRESS NOTES
"Pemafibrate to Reduce cardiovascular OutcoMes by reducing triglycerides IN patiENts with diabeTes (PROMINENT) clinical trial.    Subject seen in clinic today for study Visit 31.      Subject seen by provider Eliezer Caputo for study related physical exam.  See provider note and flow sheets for vital signs.    Vitals:    03/31/22 1408   BP: 122/70   BP Location: Right arm   Patient Position: Sitting   Cuff Size: Adult Large   Pulse: 76   Resp: 16   Weight: 101.8 kg (224 lb 6.4 oz)   Height: 1.803 m (5' 11\")     Waist measures 114 cm    Study efficacy, endpoints and adverse events reviewed with subject.  Cardiovascular risk discussed.     Study alcohol consumption stipulations and education reviewed with subject:    Subject reports  [] Never [x] Current [] Former .   1 drinks per [] Day [] Week [] Month [x] Occasional.  2 maximum drinks per sitting.      Study medication box # [id number] with 0 tabs  Study medication box # [id number] with 0 tabs  Study medication box # [id number] with 0 tabs  Study medication box # [id number] with 0 tabs    returned by subject.  Total tablet count of 0 for 95 % compliance.  Didn't take drug for 3 days while in hospital because he didn't have them.  Study medication box #'s [id number], [id number], [id number], [id number] dispensed to subject.  Education provided on medication compliance and administration    Plan: Study Visit 32 telephone call with subject in 2 months.  Will schedule study Visit  33 with subject in 4 months back in clinic.     Presley Torres, RN  Clinical Research Nurse  [phone]    Dr. Barber: Please assign causality of AE below:    Adverse Event: Adverse event   Mechanical fall Serious Adverse event   Subdural hematoma  adverse event   Subdural hematoma   Description: Slipped on ice at work and hit head, briefly loss consciousness, went to [facility] for several night stay in ICU, following with neuro,. From his fall was found to have subdural hematoma, treated at [facility].  Held anticoagulation. " Follow up with neurologist next week.  Has had headache and dizziness at times with posture change but is getting better as he recovers.   Start Date: March 7th,2022 March 7th,2022 March 7th,2022   End Date: March 7th,2022 March 10th,2022 ongoing   Date of Awareness: 3/31/22 3/31/22 3/31/22   Severity (mild/moderate/severe): moderate severe moderate   Reportable to IRB:  Yes  []     No  [x]  Yes  []     No  [x]  Yes  []     No  [x]   Serious?  Yes  []     No  [x]  Yes  [x]     No  []  Yes  []     No  [x]     Related to study drug?    Yes  []     No  [x]    Yes  []     No  [x]    Yes  []     No  [x]     Action taken with study treatment:   [x] Dose Not Changed  [] Dose Increased  [] Dose Reduced   [] Drug interrupted              [] Drug withdrawal   [] Not applicable   [] Unknown [x] Dose Not Changed  [] Dose Increased  [] Dose Reduced   [] Drug interrupted              [] Drug withdrawal   [] Not applicable   [] Unknown [x] Dose Not Changed  [] Dose Increased  [] Dose Reduced   [] Drug interrupted              [] Drug withdrawal   [] Not applicable   [] Unknown         Outcome:  []Not Recovered/Not Resolved  [x]Recovered/Resolved  []Recovered/Resolved with Sequelae   []Recovering/Resolving  []Unknown   []Fatal []Not Recovered/Not Resolved  [x]Recovered/Resolved  []Recovered/Resolved with Sequelae   []Recovering/Resolving  []Unknown   []Fatal []Not Recovered/Not Resolved  []Recovered/Resolved  []Recovered/Resolved with Sequelae   [x]Recovering/Resolving  []Unknown   []Fatal   Medication or therapies taken:  Yes  []     No  [x]  Yes  [x]     No  []  Keppra for seizure prevention 3/7-3/14/22  Held plavix and apixiban since 3/7/22  Started flexeril3/9/22  Yes  [x]     No  []

## 2022-03-31 NOTE — PATIENT INSTRUCTIONS
It was great to see you again today for the Prominent triglyceride study.  We will call you in 2 months for a study telephone visit.    We will also see you back in 4 months for your Visit 33.  Please remember to bring back your study drug and packages (both empty or full) to every study visit.  Inform us of any changes in your health and call with any questions.  Thanks!    Research Hotline: [phone]  Presley Torres RN  Clinical Research Nurse  [phone]

## 2022-03-31 NOTE — PROGRESS NOTES
Assessment/Plan:     No changes to his medications today. He will continue to follow-up with research per protocol for prominent study.  [facility]      Subjective:     Man RUIZ Curtis Geiger is seen at [facility] today for prominent study.  He fell on ice in early March and had bleeding in his head.  [facility]  He is currently off of clopidogrel and Eliquis because of this.  He is seeing a neurosurgeon within the next 2 weeks.  He got coronary stents on April 28, 2021.  I discussed with him that sometimes he comes off of clopidogrel a year after stent(s) and should ask about this when he has medical care visit there.   ( I cannot find cor angio report in EHR.)  He is only following here for this study.    Patient Active Problem List   Diagnosis     Peripheral Neuropathy     Nonalcoholic Fatty Liver Disease     Lower Back Pain     Adjustment Disorder With Anxiety And Depressed Mood     Neck Pain     Dyslipidemia     Pain in joint, ankle and foot     Obstructive sleep apnea     Right carpal tunnel syndrome     Left hand weakness     Tennis elbow     Benign essential hypertension     Obesity due to excess calories     Coronary artery disease involving native coronary artery of native heart without angina pectoris     NSTEMI (non-ST elevated myocardial infarction) (H)     Tobacco use     DM (diabetes mellitus) (H)     Paroxysmal atrial fibrillation (H)       Past Medical History:   Diagnosis Date     Atrial fibrillation (H)      Coronary artery disease 24Jan2018     Diabetes mellitus (H) 01/2005    adult onset     GERD (gastroesophageal reflux disease)      High cholesterol      Hyperlipidemia 2008     Hypertension 2006     Kidney stones 2012     Liver disease      Myocardial infarction (H)      Neuropathy of foot 07/16/2014     Obesity      DEWAYNE on CPAP      Sleep apnea      Uncontrolled type 2 diabetes mellitus with other diabetic kidney complication,  "unspecified long term insulin use status     Created by Conversion      Unspecified hereditary and idiopathic peripheral neuropathy     Created by Conversion        Past Surgical History:   Procedure Laterality Date     ARTHROSCOPY KNEE Left 8/6/15    Partial medial meniscectoy, [facility] body [facility]     CARDIAC CATHETERIZATION  01/24/2017 [facility]     CORONARY STENT PLACEMENT       HC PERCUT NEPHROSTOLITHOTOMY W W/O DILATION <=2 CM      Description: Percutaneous Lithotomy;  Proc Date: 11/05/2012;     KIDNEY STONE SURGERY Left 02/05/2018    stent placement, lithotripsy, nephrolithotomy     LEG SURGERY       OTHER SURGICAL HISTORY Left 07/11/2014    Percutaneous Lithotomy      [place] CATH PLACEMENT & NJX CORONARY ART ANGIO IMG S&I N/A 1/24/2017    Procedure: Coronary Angiogram;  Surgeon: Melissa Freeman MD;  Location: [facility] Cath Lab;  Service: Cardiology     KY L HRT CATH W/NJX L VENTRICULOGRAPHY IMG S&I N/A 1/24/2017    Procedure: Left Heart Catheterization with Left Ventriculogram;  Surgeon: Melissa Freeman MD;  Location: [facility] Cath Lab;  Service: Cardiology     RELEASE CARPAL TUNNEL Right 04/12/2016     ZZC LAP,CHOLECYSTECTOMY/EXPLORE      Description: Cholecystectomy Laparoscopic;  Proc Date: 12/11/2013;     ZZHC AMPUTATE METACARPAL+FINGER      Description: Metacarpal Amputation And Index Finger;  Recorded: 08/15/2014;  Comments: For \"cartilage cancer\"       No family history on file.    Social History     Socioeconomic History     Marital status:      Spouse name: Not on file     Number of children: Not on file     Years of education: 13     Highest education level: Not on file   Occupational History     Not on file   Tobacco Use     Smoking status: Former Smoker     Packs/day: 0.50     Smokeless tobacco: Never Used     Tobacco comment: Patient reports he is trying to quit.   Substance and Sexual Activity     Alcohol use: Yes     Drug use: No     Sexual activity: " Not on file   Other Topics Concern     Not on file   Social History Narrative    Wife of 21 years passed away on 10/15/2017. Had 8 kids with her - 3 of hers from a previous marriage, 3 of his from a previous marriage, and 2 kids they adopted together. He has 20 grandchildren.      Social Determinants of Health     Financial Resource Strain: Not on file   Food Insecurity: Not on file   Transportation Needs: Not on file   Physical Activity: Not on file   Stress: Not on file   Social Connections: Not on file   Intimate Partner Violence: Not on file   Housing Stability: Not on file       Current Outpatient Medications   Medication Sig Dispense Refill     albuterol (PROAIR RESPICLICK) 108 (90 Base) MCG/ACT inhaler Inhale 2 puffs into the lungs as needed       AMLODIPINE BESYLATE PO Take 5 mg by mouth daily       apixaban ANTICOAGULANT (ELIQUIS) 5 MG tablet Take 5 mg by mouth 2 times daily (Patient not taking: Reported on 3/31/2022)       ATORVASTATIN CALCIUM PO Take 80 mg by mouth daily       calcium-vitamin D 500-125 MG-UNIT TABS Take 1 tablet by mouth daily        clindamycin (CLEOCIN T) 1 % external lotion APPLY TO AFFECTED AREA TOPICALLY TWICE A DAY       Clopidogrel Bisulfate (PLAVIX PO) Take 75 mg by mouth daily (Patient not taking: Reported on 3/31/2022)       cyanocobalamin (VITAMIN B-12) 1000 MCG tablet Take 1,000 mcg by mouth daily       cyclobenzaprine (FLEXERIL) 10 MG tablet TAKE ONE-HALF TABLET BY MOUTH THREE TIMES A DAY AS NEEDED FOR PAIN AND MUSCLE SPASMS       glucose (BD GLUCOSE) 4 g chewable tablet CHEW FOUR TABLETS BY MOUTH  AS NEEDED FOR LOW BLOOD SUGAR REACTION *RETEST BLOOD SUGAR AFTER 15 MINUTES (USE THE 15-15 RULE TO TREAT)       insulin aspart (NOVOLOG FLEXPEN) 100 UNIT/ML injection Inject 22 Units Subcutaneous 2 times daily (with meals)        insulin glargine (LANTUS) 100 UNIT/ML injection Inject 40 Units Subcutaneous 2 times daily        Liniments (VAPORIZING CREAM) 4.7-1.2-2.6 % CREA APPLY  "THIN LAYER TOPICALLY FOUR TIMES A DAY AS NEEDED FOR NECK PAIN       LISINOPRIL PO Take 40 mg by mouth daily       METFORMIN HCL PO Take 1,000 mg by mouth 2 times daily (with meals)       METOPROLOL SUCCINATE ER PO Take 50 mg by mouth 2 times daily        minocycline (MINOCIN) 100 MG capsule Take 100 mg by mouth 2 times daily as needed       omeprazole (PRILOSEC) 20 MG DR capsule TAKE ONE CAPSULE BY MOUTH EVERY DAY TO DECREASE STOMACH ACID -TAKE ON AN EMPTY STOMACH, AT LEAST 30 MINUTES BEFORE A MEAL       Parenteral Therapy Supplies (UNIVERSAL SYRINGE TIP ADAPTOR) MISC 3 each       potassium citrate (UROCIT-K) 5 MEQ (540 MG) CR tablet Take 30 mEq by mouth 2 times daily       semaglutide (OZEMPIC) 2 MG/1.5ML SOPN pen Inject 1 mg Subcutaneous once a week       study drug pemafibrate 0.2 mg vs placebo, PROMINENT, 0.2 mg TABS tablet Take 0.2 mg by mouth 2 times daily       TAMSULOSIN HCL PO Take 0.4 mg by mouth daily       testosterone cypionate (DEPOTESTOSTERONE) 200 MG/ML injection Inject 200 mg into the muscle every 14 days       tretinoin (RETIN-A) 0.025 % external cream APPLY SPARINGLY TO FACE TOPICALLY EVERY THIRD DAY FOR 2 WEEKS, THEN APPLY SPARINGLY TO FACE EVERY OTHER DAY AT BEDTIME FOR 2 WEEKS, THEN APPLY SPARINGLY TO FACE AT BEDTIME       levETIRAcetam (KEPPRA) 500 MG tablet Take 500 mg by mouth 2 times daily (Patient not taking: Reported on 3/31/2022)         Allergies   Allergen Reactions     Gabapentin Swelling     Of face and hands     Nsaids      Pt is on Plavix       Objective:     /70 (BP Location: Right arm, Patient Position: Sitting, Cuff Size: Adult Large)   Pulse 76   Resp 16   Ht 1.803 m (5' 11\")   Wt 101.8 kg (224 lb 6.4 oz)   BMI 31.30 kg/m    Wt Readings from Last 3 Encounters:   03/31/22 101.8 kg (224 lb 6.4 oz)   03/31/22 101.8 kg (224 lb 6.4 oz)   11/04/21 105.7 kg (233 lb)       General Appearance:   Alert, cooperative and in no acute distress.   HEENT:  No scleral icterus; the " mucous membranes were pink and moist. Cervical lymph nodes nontender and nonpalpable.   Neck: JVP normal. No HJR.   Chest: The spine was straight. The chest was symmetric.   Lungs:   Respirations unlabored; the lungs are clear to auscultation.   Cardiovascular:   Regular rhythm. S1 and S2 without murmur, clicks or rubs. Radial, carotid and posterior tibial pulses are intact and symmetrical.  No carotid bruits noted   Abdomen:  Soft, nontender, nondistended, bowel sounds present   Extremities: No cyanosis, clubbing, or edema.   Skin: No bruising or wounds   Neurologic: Mood and affect are appropriate.             Ольга Caputo RN, CNP  [facility] cardiology office

## 2022-03-31 NOTE — LETTER
"3/31/2022    Jovita Nunez  [facility] [address] [place] [address]    RE: Man Acevedo Sr.       Dear Colleague,     I had the pleasure of seeing Man Acevedo Sr. in the [facility]alth [facility].  Pemafibrate to Reduce cardiovascular OutcoMes by reducing triglycerides IN patiENts with diabeTes (PROMINENT) clinical trial.    Subject seen in clinic today for study Visit 31.      Subject seen by provider Eliezer Caputo for study related physical exam.  See provider note and flow sheets for vital signs.    Vitals:    03/31/22 1408   BP: 122/70   BP Location: Right arm   Patient Position: Sitting   Cuff Size: Adult Large   Pulse: 76   Resp: 16   Weight: 101.8 kg (224 lb 6.4 oz)   Height: 1.803 m (5' 11\")     Waist measures 114 cm    Study efficacy, endpoints and adverse events reviewed with subject.  Cardiovascular risk discussed.     Study alcohol consumption stipulations and education reviewed with subject:    Subject reports  [] Never [x] Current [] Former .   1 drinks per [] Day [] Week [] Month [x] Occasional.  2 maximum drinks per sitting.      Study medication box # [id number] with 0 tabs  Study medication box # [id number] with 0 tabs  Study medication box # [id number] with 0 tabs  Study medication box # [id number] with 0 tabs    returned by subject.  Total tablet count of 0 for 95 % compliance.  Didn't take drug for 3 days while in hospital because he didn't have them.  Study medication box #'s [id number], [id number], [id number], [id number] dispensed to subject.  Education provided on medication compliance and administration    Plan: Study Visit 32 telephone call with subject in 2 months.  Will schedule study Visit  33 with subject in 4 months back in clinic.     Presley Torres, RN  Clinical Research Nurse  [phone]    Dr. Barber: Please assign causality of AE below:    Adverse Event: Adverse event   Mechanical fall Serious Adverse event   Subdural hematoma  adverse event   Subdural hematoma   Description: " Slipped on ice at work and hit head, briefly loss consciousness, went to [facility] for several night stay in ICU, following with neuro,. From his fall was found to have subdural hematoma, treated at [facility].  Held anticoagulation. Follow up with neurologist next week.  Has had headache and dizziness at times with posture change but is getting better as he recovers.   Start Date: March 7th,2022 March 7th,2022 March 7th,2022   End Date: March 7th,2022 March 10th,2022 ongoing   Date of Awareness: 3/31/22 3/31/22 3/31/22   Severity (mild/moderate/severe): moderate severe moderate   Reportable to IRB:  Yes  []     No  [x]  Yes  []     No  [x]  Yes  []     No  [x]   Serious?  Yes  []     No  [x]  Yes  [x]     No  []  Yes  []     No  [x]     Related to study drug?    Yes  []     No  []    Yes  []     No  []    Yes  []     No  []     Action taken with study treatment:   [x] Dose Not Changed  [] Dose Increased  [] Dose Reduced   [] Drug interrupted              [] Drug withdrawal   [] Not applicable   [] Unknown [x] Dose Not Changed  [] Dose Increased  [] Dose Reduced   [] Drug interrupted              [] Drug withdrawal   [] Not applicable   [] Unknown [x] Dose Not Changed  [] Dose Increased  [] Dose Reduced   [] Drug interrupted              [] Drug withdrawal   [] Not applicable   [] Unknown         Outcome:  []Not Recovered/Not Resolved  [x]Recovered/Resolved  []Recovered/Resolved with Sequelae   []Recovering/Resolving  []Unknown   []Fatal []Not Recovered/Not Resolved  [x]Recovered/Resolved  []Recovered/Resolved with Sequelae   []Recovering/Resolving  []Unknown   []Fatal []Not Recovered/Not Resolved  []Recovered/Resolved  []Recovered/Resolved with Sequelae   [x]Recovering/Resolving  []Unknown   []Fatal   Medication or therapies taken:  Yes  []     No  [x]  Yes  [x]     No  []  Keppra for seizure prevention 3/7-3/14/22  Held plavix and apixiban since 3/7/22  Started flexeril3/9/22  Yes  [x]     No  []           Thank you for  allowing me to participate in the care of your patient.      Sincerely,     Presley Torres RN     [facility]  cc:   No referring provider defined for this encounter.

## 2022-04-12 ENCOUNTER — TELEPHONE (OUTPATIENT)
Dept: CARDIOLOGY | Facility: CLINIC | Age: 62
End: 2022-04-12
Payer: COMMERCIAL

## 2022-04-12 NOTE — TELEPHONE ENCOUNTER
Pemafibrate to Reduce cardiovascular OutcoMes by reducing triglycerides IN patiENts with diabeTes (PROMINENT) clinical trial.    Subject contacted today to relay study sponsor announcement of ending the trial due to not meeting primary endpoint.      Explained study early closure not due to safety issue but rather it's forecasted they won't meet the data required for their endpoints to get approval.   Asked subject to stop taking study drug today and hold onto their boxes of supply.    Will set up a end of study visit 30 days out to a maximum of 60 days post last dose of drug.  This will include fasting labs, a study provider exam, and general close out.    Patient relayed understanding.  He works [employer] so Thursday and Fridays work best for closeout.    Presley Torres, RN  Clinical Research Nurse  [phone]

## 2022-05-20 ENCOUNTER — OFFICE VISIT (OUTPATIENT)
Dept: CARDIOLOGY | Facility: CLINIC | Age: 62
End: 2022-05-20

## 2022-05-20 ENCOUNTER — OFFICE VISIT (OUTPATIENT)
Dept: CARDIOLOGY | Facility: CLINIC | Age: 62
End: 2022-05-20
Payer: COMMERCIAL

## 2022-05-20 VITALS
RESPIRATION RATE: 14 BRPM | DIASTOLIC BLOOD PRESSURE: 70 MMHG | BODY MASS INDEX: 31.5 KG/M2 | HEART RATE: 90 BPM | HEIGHT: 71 IN | WEIGHT: 225 LBS | SYSTOLIC BLOOD PRESSURE: 100 MMHG

## 2022-05-20 DIAGNOSIS — Z00.6 EXAMINATION OF PARTICIPANT IN CLINICAL TRIAL: ICD-10-CM

## 2022-05-20 DIAGNOSIS — E78.5 DYSLIPIDEMIA: Primary | Chronic | ICD-10-CM

## 2022-05-20 DIAGNOSIS — Z00.00 EXAMINATION: Primary | ICD-10-CM

## 2022-05-20 PROBLEM — S06.9XAA TBI (TRAUMATIC BRAIN INJURY) (H): Status: ACTIVE | Noted: 2022-05-20

## 2022-05-20 PROCEDURE — 84999 UNLISTED CHEMISTRY PROCEDURE: CPT

## 2022-05-20 PROCEDURE — 99214 OFFICE O/P EST MOD 30 MIN: CPT | Performed by: NURSE PRACTITIONER

## 2022-05-20 PROCEDURE — 36415 COLL VENOUS BLD VENIPUNCTURE: CPT

## 2022-05-20 PROCEDURE — 99207 PR NO CHARGE-RESEARCH SERVICE: CPT

## 2022-05-20 RX ORDER — NITROGLYCERIN 0.4 MG/1
0.4 TABLET SUBLINGUAL PRN
COMMUNITY
Start: 2021-07-29

## 2022-05-20 RX ORDER — CLINDAMYCIN PHOSPHATE 10 UG/ML
LOTION TOPICAL
COMMUNITY
Start: 2021-11-24

## 2022-05-20 NOTE — PROGRESS NOTES
HEART CARE RESEARCH NOTE      [facility]  [phone]      Assessment/Recommendations   Today was his last visit for PROMINENT research study.  [facility]       History of Present Illness/Subjective    Man JOSEPH Acevedo Sr. is seen at [facility] for PROMINENT research study.  He has a history of coronary artery disease, MI in April 2021, atrial fibrillation, atrial fibrillation ablation, hypertension, diabetes, dyslipidemia, obstructive sleep apnea (untreated), tobacco use, and renal cancer.  He slipped on the ice at work on March 7, 2022 and suffered a skull fracture, bleeding around brainstem, traumatic brain injury, and dislocated jaw.  [facility]  He continues to have headaches and vertigo.  He has some problems with memory since injury.  He is having back pain and will have an MRI in June.  He is not working due to injury.  He is seeing PT and OT.  He has chronic shortness of breath with activity which is at baseline.  He denies chest pain.  He has occasional palpitations and was [facility].  He wore a 10-day monitor.  Report not available to review but he states that he had increased heart rates at night which his cardiologist feels is related to dreams.  No changes made to his medications.    He started smoking again and is smoking about half pack per day.  He is planning to quit in the near future.    Patient Active Problem List   Diagnosis     Peripheral Neuropathy     Nonalcoholic Fatty Liver Disease     Lower Back Pain     Adjustment Disorder With Anxiety And Depressed Mood     Neck Pain     Dyslipidemia     Pain in joint, ankle and foot     Obstructive sleep apnea     Right carpal tunnel syndrome     Left hand weakness     Tennis elbow     Benign essential hypertension     Obesity due to excess calories     Coronary artery disease involving native coronary artery of native heart  without angina pectoris     NSTEMI (non-ST elevated myocardial infarction) (H)     Tobacco use     DM (diabetes mellitus) (H)     Paroxysmal atrial fibrillation (H)       Past Medical History:   Diagnosis Date     Atrial fibrillation (H)      Coronary artery disease 24Jan2018     Diabetes mellitus (H) 01/2005    adult onset     GERD (gastroesophageal reflux disease)      High cholesterol      Hyperlipidemia 2008     Hypertension 2006     Kidney stones 2012     Liver disease      Myocardial infarction (H)      Neuropathy of foot 07/16/2014     Obesity      DEWAYNE on CPAP      Sleep apnea      Uncontrolled type 2 diabetes mellitus with other diabetic kidney complication, unspecified long term insulin use status     Created by Conversion      Unspecified hereditary and idiopathic peripheral neuropathy     Created by Conversion        Past Surgical History:   Procedure Laterality Date     ARTHROSCOPY KNEE Left 8/6/15    Partial medial meniscectoy, foreign body [facility]     CARDIAC CATHETERIZATION  01/24/2017 [facility]     CORONARY STENT PLACEMENT       HC PERCUT NEPHROSTOLITHOTOMY W W/O DILATION <=2 CM      Description: Percutaneous Lithotomy;  Proc Date: 11/05/2012;     KIDNEY STONE SURGERY Left 02/05/2018    stent placement, lithotripsy, nephrolithotomy     LEG SURGERY       OTHER SURGICAL HISTORY Left 07/11/2014    Percutaneous Lithotomy      TX CATH PLACEMENT & NJX CORONARY ART ANGIO IMG S&I N/A 1/24/2017    Procedure: Coronary Angiogram;  Surgeon: Melissa Freeman MD;  Location: [facility] Cath Lab;  Service: Cardiology     TX L HRT CATH W/NJX L VENTRICULOGRAPHY IMG S&I N/A 1/24/2017    Procedure: Left Heart Catheterization with Left Ventriculogram;  Surgeon: Melissa Freeman MD;  Location: [facility] Cath Lab;  Service: Cardiology     RELEASE CARPAL TUNNEL Right 04/12/2016     ZZC LAP,CHOLECYSTECTOMY/EXPLORE      Description: Cholecystectomy Laparoscopic;  Proc Date: 12/11/2013;      "ZZHC AMPUTATE METACARPAL+FINGER      Description: Metacarpal Amputation And Index Finger;  Recorded: 08/15/2014;  Comments: For \"cartilage cancer\"       No family history on file.    Social History     Socioeconomic History     Marital status:      Spouse name: Not on file     Number of children: Not on file     Years of education: 13     Highest education level: Not on file   Occupational History     Not on file   Tobacco Use     Smoking status: Former Smoker     Packs/day: 0.50     Smokeless tobacco: Never Used     Tobacco comment: Patient reports he is trying to quit.   Substance and Sexual Activity     Alcohol use: Yes     Drug use: No     Sexual activity: Not on file   Other Topics Concern     Not on file   Social History Narrative    Wife of 21 years passed away on 10/15/2017. Had 8 kids with her - 3 of hers from a previous marriage, 3 of his from a previous marriage, and 2 kids they adopted together. He has 20 grandchildren.      Social Determinants of Health     Financial Resource Strain: Not on file   Food Insecurity: Not on file   Transportation Needs: Not on file   Physical Activity: Not on file   Stress: Not on file   Social Connections: Not on file   Intimate Partner Violence: Not on file   Housing Stability: Not on file       Current Outpatient Medications   Medication Sig Dispense Refill     albuterol (PROAIR RESPICLICK) 108 (90 Base) MCG/ACT inhaler Inhale 2 puffs into the lungs as needed       amLODIPine (NORVASC) 5 MG tablet Take 5 mg by mouth daily       apixaban ANTICOAGULANT (ELIQUIS) 5 MG tablet Take 5 mg by mouth 2 times daily       atorvastatin (LIPITOR) 80 MG tablet Take 80 mg by mouth daily       benzoyl peroxide 5 % external liquid        calcium-vitamin D 500-125 MG-UNIT TABS Take 1 tablet by mouth daily        clindamycin (CLEOCIN T) 1 % external lotion        clindamycin (CLEOCIN T) 1 % external lotion APPLY TO AFFECTED AREA TOPICALLY TWICE A DAY       clopidogrel (PLAVIX) 75 MG " tablet Take 75 mg by mouth daily       cyanocobalamin (VITAMIN B-12) 1000 MCG tablet Take 1,000 mcg by mouth daily       glucose (BD GLUCOSE) 4 g chewable tablet CHEW FOUR TABLETS BY MOUTH  AS NEEDED FOR LOW BLOOD SUGAR REACTION *RETEST BLOOD SUGAR AFTER 15 MINUTES (USE THE 15-15 RULE TO TREAT)       insulin aspart (NOVOLOG PEN) 100 UNIT/ML pen Inject 22 Units Subcutaneous 2 times daily (with meals)        insulin glargine (LANTUS) 100 UNIT/ML injection Inject 40 Units Subcutaneous 2 times daily        Liniments (VAPORIZING CREAM) 4.7-1.2-2.6 % CREA APPLY THIN LAYER TOPICALLY FOUR TIMES A DAY AS NEEDED FOR NECK PAIN       lisinopril (ZESTRIL) 40 MG tablet Take 40 mg by mouth daily       metFORMIN (GLUCOPHAGE) 1000 MG tablet Take 1,000 mg by mouth 2 times daily (with meals)       metoprolol succinate ER (TOPROL XL) 50 MG 24 hr tablet Take 50 mg by mouth 2 times daily        minocycline (MINOCIN) 100 MG capsule Take 100 mg by mouth 2 times daily as needed       nitroGLYcerin (NITROSTAT) 0.4 MG sublingual tablet Place 0.4 mg under the tongue as needed for chest pain       omeprazole (PRILOSEC) 20 MG DR capsule TAKE ONE CAPSULE BY MOUTH EVERY DAY TO DECREASE STOMACH ACID -TAKE ON AN EMPTY STOMACH, AT LEAST 30 MINUTES BEFORE A MEAL       Parenteral Therapy Supplies (UNIVERSAL SYRINGE TIP ADAPTOR) MISC 3 each       potassium citrate 15 MEQ (1620 MG) TBCR Take 30 mEq by mouth 2 times daily       semaglutide (OZEMPIC) 2 MG/1.5ML SOPN pen Inject 1 mg Subcutaneous once a week       study drug pemafibrate 0.2 mg vs placebo, PROMINENT, 0.2 mg TABS tablet Take 0.2 mg by mouth 2 times daily       TAMSULOSIN HCL PO Take 0.4 mg by mouth daily       testosterone cypionate (DEPOTESTOSTERONE) 200 MG/ML injection Inject 200 mg into the muscle every 14 days       tretinoin (RETIN-A) 0.025 % external cream APPLY SPARINGLY TO FACE TOPICALLY EVERY THIRD DAY FOR 2 WEEKS, THEN APPLY SPARINGLY TO FACE EVERY OTHER DAY AT BEDTIME FOR 2 WEEKS,  "THEN APPLY SPARINGLY TO FACE AT BEDTIME         Allergies   Allergen Reactions     Empagliflozin Headache and Other (See Comments)     Gabapentin Swelling     Of face and hands     Nsaids      Pt is on Plavix     Tolmetin Unknown     Pt is on Plavix  Pt is on Plavix          Physical Examination Review of Systems   /70 (BP Location: Right arm, Patient Position: Sitting, Cuff Size: Adult Large)   Pulse 90   Resp 14   Ht 1.803 m (5' 11\")   Wt 102.1 kg (225 lb)   BMI 31.38 kg/m    Body mass index is 31.38 kg/m .  Wt Readings from Last 3 Encounters:   05/20/22 102.1 kg (225 lb)   03/31/22 101.8 kg (224 lb 6.4 oz)   03/31/22 101.8 kg (224 lb 6.4 oz)       General Appearance:     Alert, cooperative and in no acute distress.   ENT/Mouth: membranes moist, no oral lesions or bleeding gums.      EYES:  no scleral icterus, normal conjunctivae   Chest/Lungs:   lungs are clear to auscultation, no rales or wheezing, respirations unlabored   Cardiovascular:   Regular. Normal first and second heart sounds, no edema bilateral lower extremities    Abdomen:  Soft, nontender, nondistended, bowel sounds present   Extremities: no cyanosis or clubbing   Skin: warm, dry.    Neurologic: mood and affect are appropriate, alert and oriented x3      Enc Vitals  BP: 100/70  Pulse: 90  Resp: 14  Weight: 102.1 kg (225 lb) (With shoes.)  Height: 180.3 cm (5' 11\")                                             Medical History  Surgical History Family History Social History   Past Medical History:   Diagnosis Date     Atrial fibrillation (H)      Coronary artery disease 24Jan2018     Diabetes mellitus (H) 01/2005    adult onset     GERD (gastroesophageal reflux disease)      High cholesterol      Hyperlipidemia 2008     Hypertension 2006     Kidney stones 2012     Liver disease      Myocardial infarction (H)      Neuropathy of foot 07/16/2014     Obesity      DEWAYNE on CPAP      Sleep apnea      Uncontrolled type 2 diabetes mellitus with other " "diabetic kidney complication, unspecified long term insulin use status     Created by Conversion      Unspecified hereditary and idiopathic peripheral neuropathy     Created by Conversion     Past Surgical History:   Procedure Laterality Date     ARTHROSCOPY KNEE Left 8/6/15    Partial medial meniscectoy, [facility]     CARDIAC CATHETERIZATION  01/24/2017 [facility]     CORONARY STENT PLACEMENT       HC PERCUT NEPHROSTOLITHOTOMY W W/O DILATION <=2 CM      Description: Percutaneous Lithotomy;  Proc Date: 11/05/2012;     KIDNEY STONE SURGERY Left 02/05/2018    stent placement, lithotripsy, nephrolithotomy     LEG SURGERY       OTHER SURGICAL HISTORY Left 07/11/2014    Percutaneous Lithotomy      NH CATH PLACEMENT & NJX CORONARY ART ANGIO IMG S&I N/A 1/24/2017    Procedure: Coronary Angiogram;  Surgeon: Melissa Freeman MD;  Location: [facility] Cath Lab;  Service: Cardiology     NH L HRT CATH W/NJX L VENTRICULOGRAPHY IMG S&I N/A 1/24/2017    Procedure: Left Heart Catheterization with Left Ventriculogram;  Surgeon: Melissa Freeman MD;  Location: [facility] Cath Lab;  Service: Cardiology     RELEASE CARPAL TUNNEL Right 04/12/2016     ZZC LAP,CHOLECYSTECTOMY/EXPLORE      Description: Cholecystectomy Laparoscopic;  Proc Date: 12/11/2013;     ZZHC AMPUTATE METACARPAL+FINGER      Description: Metacarpal Amputation And Index Finger;  Recorded: 08/15/2014;  Comments: For \"cartilage cancer\"    No family history on file. Social History     Socioeconomic History     Marital status:      Spouse name: Not on file     Number of children: Not on file     Years of education: 13     Highest education level: Not on file   Occupational History     Not on file   Tobacco Use     Smoking status: Former Smoker     Packs/day: 0.50     Smokeless tobacco: Never Used     Tobacco comment: Patient reports he is trying to quit.   Substance and Sexual Activity     Alcohol use: Yes     Drug use: No "     Sexual activity: Not on file   Other Topics Concern     Not on file   Social History Narrative    Wife of 21 years passed away on 10/15/2017. Had 8 kids with her - 3 of hers from a previous marriage, 3 of his from a previous marriage, and 2 kids they adopted together. He has 20 grandchildren.      Social Determinants of Health     Financial Resource Strain: Not on file   Food Insecurity: Not on file   Transportation Needs: Not on file   Physical Activity: Not on file   Stress: Not on file   Social Connections: Not on file   Intimate Partner Violence: Not on file   Housing Stability: Not on file          Medications  Allergies   Current Outpatient Medications   Medication Sig Dispense Refill     albuterol (PROAIR RESPICLICK) 108 (90 Base) MCG/ACT inhaler Inhale 2 puffs into the lungs as needed       amLODIPine (NORVASC) 5 MG tablet Take 5 mg by mouth daily       apixaban ANTICOAGULANT (ELIQUIS) 5 MG tablet Take 5 mg by mouth 2 times daily       atorvastatin (LIPITOR) 80 MG tablet Take 80 mg by mouth daily       benzoyl peroxide 5 % external liquid        calcium-vitamin D 500-125 MG-UNIT TABS Take 1 tablet by mouth daily        clindamycin (CLEOCIN T) 1 % external lotion        clindamycin (CLEOCIN T) 1 % external lotion APPLY TO AFFECTED AREA TOPICALLY TWICE A DAY       clopidogrel (PLAVIX) 75 MG tablet Take 75 mg by mouth daily       cyanocobalamin (VITAMIN B-12) 1000 MCG tablet Take 1,000 mcg by mouth daily       glucose (BD GLUCOSE) 4 g chewable tablet CHEW FOUR TABLETS BY MOUTH  AS NEEDED FOR LOW BLOOD SUGAR REACTION *RETEST BLOOD SUGAR AFTER 15 MINUTES (USE THE 15-15 RULE TO TREAT)       insulin aspart (NOVOLOG PEN) 100 UNIT/ML pen Inject 22 Units Subcutaneous 2 times daily (with meals)        insulin glargine (LANTUS) 100 UNIT/ML injection Inject 40 Units Subcutaneous 2 times daily        Liniments (VAPORIZING CREAM) 4.7-1.2-2.6 % CREA APPLY THIN LAYER TOPICALLY FOUR TIMES A DAY AS NEEDED FOR NECK PAIN        lisinopril (ZESTRIL) 40 MG tablet Take 40 mg by mouth daily       metFORMIN (GLUCOPHAGE) 1000 MG tablet Take 1,000 mg by mouth 2 times daily (with meals)       metoprolol succinate ER (TOPROL XL) 50 MG 24 hr tablet Take 50 mg by mouth 2 times daily        minocycline (MINOCIN) 100 MG capsule Take 100 mg by mouth 2 times daily as needed       nitroGLYcerin (NITROSTAT) 0.4 MG sublingual tablet Place 0.4 mg under the tongue as needed for chest pain       omeprazole (PRILOSEC) 20 MG DR capsule TAKE ONE CAPSULE BY MOUTH EVERY DAY TO DECREASE STOMACH ACID -TAKE ON AN EMPTY STOMACH, AT LEAST 30 MINUTES BEFORE A MEAL       Parenteral Therapy Supplies (UNIVERSAL SYRINGE TIP ADAPTOR) MISC 3 each       potassium citrate 15 MEQ (1620 MG) TBCR Take 30 mEq by mouth 2 times daily       semaglutide (OZEMPIC) 2 MG/1.5ML SOPN pen Inject 1 mg Subcutaneous once a week       study drug pemafibrate 0.2 mg vs placebo, PROMINENT, 0.2 mg TABS tablet Take 0.2 mg by mouth 2 times daily       TAMSULOSIN HCL PO Take 0.4 mg by mouth daily       testosterone cypionate (DEPOTESTOSTERONE) 200 MG/ML injection Inject 200 mg into the muscle every 14 days       tretinoin (RETIN-A) 0.025 % external cream APPLY SPARINGLY TO FACE TOPICALLY EVERY THIRD DAY FOR 2 WEEKS, THEN APPLY SPARINGLY TO FACE EVERY OTHER DAY AT BEDTIME FOR 2 WEEKS, THEN APPLY SPARINGLY TO FACE AT BEDTIME      Allergies   Allergen Reactions     Empagliflozin Headache and Other (See Comments)     Gabapentin Swelling     Of face and hands     Nsaids      Pt is on Plavix     Tolmetin Unknown     Pt is on Plavix  Pt is on Plavix

## 2022-05-20 NOTE — LETTER
"5/20/2022    Jovita Nunez  [facility] [address]   [place] [facility] [address]    RE: Man Acevedo Sr.       Dear Colleague,     I had the pleasure of seeing Man Acevedo Sr. in the [facility].  Pemafibrate to Reduce cardiovascular OutcoMes by reducing triglycerides IN patiENts with diabeTes (PROMINENT) clinical trial.    Subject seen in clinic today for End of study visit.      Subject seen by provider for study related physical exam.  See provider note and flow sheets for vital signs.  Waist Measurements: 126 cm  EQ-5D-5L questionnaire completed if applicable.  Must complete annually (Month 12, etc.)    Study efficacy, endpoints and adverse events reviewed with subject.  Cardiovascular risk discussed.     Study central labs and urine drawn per protocol.    Study alcohol consumption stipulations and education reviewed with subject:    Subject reports  [] Never [] Current [x] Former, stop date  .   2-4  drinks per [] Day [] Week [x] Month [] Occasional.  1 maximum drinks per sitting.     Study medication box # [id number] with 70 tabs  Study medication box # [id number] with 70 tabs  Study medication box # [id number] 70 tabs  Study medication box # [id number] with 62 tabs  returned by subject.  Total tablet count of 272 for 100 % compliance.  Adverse Events of :  1.Hypertension Exacerbation  2.Recovering from \"Subdural Hemotoma\"  3.Pulmoney Nodule  4.Intermittent Headaches  5.Achalasia  6.Intermittent Palpitations/Paroysmal A Fib  7.Hypogonadism  Are considered ongoing at the end of study.    Plan: This is the final study visit and there is no further research follow up required.     Thank you for your participation in the Pemafibrate to Reduce cardiovascular OutcoMes by reducing triglycerides IN patiENts with diabeTes (PROMINENT) clinical trial.  It's been a pleasure getting to know you during the study and I appreciate all of your time and effort put in.  This is our final contact for the study " but we can let you know of any future studies that might be of interest to you.  Don't hesitate to reach out with any questions or thoughts in the future as well.    Best regards,  Renee Aguilar RN   Research Nurse      Thank you for allowing me to participate in the care of your patient.      Sincerely,     Renee Aguilar RN     [facility]  cc:   No referring provider defined for this encounter.

## 2022-05-20 NOTE — LETTER
5/20/2022    Jovita Nunez  [facility] [address] [place] [address]    RE: Man Acevedo Sr.       Dear Colleague,     I had the pleasure of seeing Man Acevedo Sr. in the [facility].  HEART CARE RESEARCH NOTE      [facility]  [phone]      Assessment/Recommendations   Today was his last visit for PROMINENT research study.  [facility]       History of Present Illness/Subjective    Man Acevedo Sr. is seen at [facility] for PROMINENT research study.  He has a history of coronary artery disease, MI in April 2021, atrial fibrillation, atrial fibrillation ablation, hypertension, diabetes, dyslipidemia, obstructive sleep apnea (untreated), tobacco use, and renal cancer.  He slipped on the ice at work on March 7, 2022 and suffered a skull fracture, bleeding around brainstem, traumatic brain injury, and dislocated jaw.  [facility]  He continues to have headaches and vertigo.  He has some problems with memory since injury.  He is having back pain and will have an MRI in June.  He is not working due to injury.  He is seeing PT and OT.  He has chronic shortness of breath with activity which is at baseline.  He denies chest pain.  He has occasional palpitations and was evaluated [facility].  He wore a 10-day monitor.  Report not available to review but he states that he had increased heart rates at night which his cardiologist feels is related to dreams.  No changes made to his medications.    He started smoking again and is smoking about half pack per day.  He is planning to quit in the near future.    Patient Active Problem List   Diagnosis     Peripheral Neuropathy     Nonalcoholic Fatty Liver Disease     Lower Back Pain     Adjustment Disorder With Anxiety And Depressed Mood     Neck Pain     Dyslipidemia     Pain in joint, ankle and foot     Obstructive sleep  apnea     Right carpal tunnel syndrome     Left hand weakness     Tennis elbow     Benign essential hypertension     Obesity due to excess calories     Coronary artery disease involving native coronary artery of native heart without angina pectoris     NSTEMI (non-ST elevated myocardial infarction) (H)     Tobacco use     DM (diabetes mellitus) (H)     Paroxysmal atrial fibrillation (H)       Past Medical History:   Diagnosis Date     Atrial fibrillation (H)      Coronary artery disease 24Jan2018     Diabetes mellitus (H) 01/2005    adult onset     GERD (gastroesophageal reflux disease)      High cholesterol      Hyperlipidemia 2008     Hypertension 2006     Kidney stones 2012     Liver disease      Myocardial infarction (H)      Neuropathy of foot 07/16/2014     Obesity      DEWAYNE on CPAP      Sleep apnea      Uncontrolled type 2 diabetes mellitus with other diabetic kidney complication, unspecified long term insulin use status     Created by Conversion      Unspecified hereditary and idiopathic peripheral neuropathy     Created by Conversion        Past Surgical History:   Procedure Laterality Date     ARTHROSCOPY KNEE Left 8/6/15    Partial medial meniscectoy, foreign body removal[facility]     CARDIAC CATHETERIZATION  01/24/2017 [facility]     CORONARY STENT PLACEMENT       HC PERCUT NEPHROSTOLITHOTOMY W W/O DILATION <=2 CM      Description: Percutaneous Lithotomy;  Proc Date: 11/05/2012;     KIDNEY STONE SURGERY Left 02/05/2018    stent placement, lithotripsy, nephrolithotomy     LEG SURGERY       OTHER SURGICAL HISTORY Left 07/11/2014    Percutaneous Lithotomy      NH CATH PLACEMENT & NJX CORONARY ART ANGIO IMG S&I N/A 1/24/2017    Procedure: Coronary Angiogram;  Surgeon: Melissa Freeman MD;  Location: [facility];  Service: Cardiology     NH L HRT CATH W/NJX L VENTRICULOGRAPHY IMG S&I N/A 1/24/2017    Procedure: Left Heart Catheterization with Left Ventriculogram;  Surgeon: Melissa  "MD Lydia;  Location: [facility];  Service: Cardiology     RELEASE CARPAL TUNNEL Right 04/12/2016     ZZC LAP,CHOLECYSTECTOMY/EXPLORE      Description: Cholecystectomy Laparoscopic;  Proc Date: 12/11/2013;     ZZ AMPUTATE METACARPAL+FINGER      Description: Metacarpal Amputation And Index Finger;  Recorded: 08/15/2014;  Comments: For \"cartilage cancer\"       No family history on file.    Social History     Socioeconomic History     Marital status:      Spouse name: Not on file     Number of children: Not on file     Years of education: 13     Highest education level: Not on file   Occupational History     Not on file   Tobacco Use     Smoking status: Former Smoker     Packs/day: 0.50     Smokeless tobacco: Never Used     Tobacco comment: Patient reports he is trying to quit.   Substance and Sexual Activity     Alcohol use: Yes     Drug use: No     Sexual activity: Not on file   Other Topics Concern     Not on file   Social History Narrative    Wife of 21 years passed away on 10/15/2017. Had 8 kids with her - 3 of hers from a previous marriage, 3 of his from a previous marriage, and 2 kids they adopted together. He has 20 grandchildren.      Social Determinants of Health     Financial Resource Strain: Not on file   Food Insecurity: Not on file   Transportation Needs: Not on file   Physical Activity: Not on file   Stress: Not on file   Social Connections: Not on file   Intimate Partner Violence: Not on file   Housing Stability: Not on file       Current Outpatient Medications   Medication Sig Dispense Refill     albuterol (PROAIR RESPICLICK) 108 (90 Base) MCG/ACT inhaler Inhale 2 puffs into the lungs as needed       amLODIPine (NORVASC) 5 MG tablet Take 5 mg by mouth daily       apixaban ANTICOAGULANT (ELIQUIS) 5 MG tablet Take 5 mg by mouth 2 times daily       atorvastatin (LIPITOR) 80 MG tablet Take 80 mg by mouth daily       benzoyl peroxide 5 % external liquid        calcium-vitamin D " 500-125 MG-UNIT TABS Take 1 tablet by mouth daily        clindamycin (CLEOCIN T) 1 % external lotion        clindamycin (CLEOCIN T) 1 % external lotion APPLY TO AFFECTED AREA TOPICALLY TWICE A DAY       clopidogrel (PLAVIX) 75 MG tablet Take 75 mg by mouth daily       cyanocobalamin (VITAMIN B-12) 1000 MCG tablet Take 1,000 mcg by mouth daily       glucose (BD GLUCOSE) 4 g chewable tablet CHEW FOUR TABLETS BY MOUTH  AS NEEDED FOR LOW BLOOD SUGAR REACTION *RETEST BLOOD SUGAR AFTER 15 MINUTES (USE THE 15-15 RULE TO TREAT)       insulin aspart (NOVOLOG PEN) 100 UNIT/ML pen Inject 22 Units Subcutaneous 2 times daily (with meals)        insulin glargine (LANTUS) 100 UNIT/ML injection Inject 40 Units Subcutaneous 2 times daily        Liniments (VAPORIZING CREAM) 4.7-1.2-2.6 % CREA APPLY THIN LAYER TOPICALLY FOUR TIMES A DAY AS NEEDED FOR NECK PAIN       lisinopril (ZESTRIL) 40 MG tablet Take 40 mg by mouth daily       metFORMIN (GLUCOPHAGE) 1000 MG tablet Take 1,000 mg by mouth 2 times daily (with meals)       metoprolol succinate ER (TOPROL XL) 50 MG 24 hr tablet Take 50 mg by mouth 2 times daily        minocycline (MINOCIN) 100 MG capsule Take 100 mg by mouth 2 times daily as needed       nitroGLYcerin (NITROSTAT) 0.4 MG sublingual tablet Place 0.4 mg under the tongue as needed for chest pain       omeprazole (PRILOSEC) 20 MG DR capsule TAKE ONE CAPSULE BY MOUTH EVERY DAY TO DECREASE STOMACH ACID -TAKE ON AN EMPTY STOMACH, AT LEAST 30 MINUTES BEFORE A MEAL       Parenteral Therapy Supplies (UNIVERSAL SYRINGE TIP ADAPTOR) MISC 3 each       potassium citrate 15 MEQ (1620 MG) TBCR Take 30 mEq by mouth 2 times daily       semaglutide (OZEMPIC) 2 MG/1.5ML SOPN pen Inject 1 mg Subcutaneous once a week       study drug pemafibrate 0.2 mg vs placebo, PROMINENT, 0.2 mg TABS tablet Take 0.2 mg by mouth 2 times daily       TAMSULOSIN HCL PO Take 0.4 mg by mouth daily       testosterone cypionate (DEPOTESTOSTERONE) 200 MG/ML  "injection Inject 200 mg into the muscle every 14 days       tretinoin (RETIN-A) 0.025 % external cream APPLY SPARINGLY TO FACE TOPICALLY EVERY THIRD DAY FOR 2 WEEKS, THEN APPLY SPARINGLY TO FACE EVERY OTHER DAY AT BEDTIME FOR 2 WEEKS, THEN APPLY SPARINGLY TO FACE AT BEDTIME         Allergies   Allergen Reactions     Empagliflozin Headache and Other (See Comments)     Gabapentin Swelling     Of face and hands     Nsaids      Pt is on Plavix     Tolmetin Unknown     Pt is on Plavix  Pt is on Plavix          Physical Examination Review of Systems   /70 (BP Location: Right arm, Patient Position: Sitting, Cuff Size: Adult Large)   Pulse 90   Resp 14   Ht 1.803 m (5' 11\")   Wt 102.1 kg (225 lb)   BMI 31.38 kg/m    Body mass index is 31.38 kg/m .  Wt Readings from Last 3 Encounters:   05/20/22 102.1 kg (225 lb)   03/31/22 101.8 kg (224 lb 6.4 oz)   03/31/22 101.8 kg (224 lb 6.4 oz)       General Appearance:     Alert, cooperative and in no acute distress.   ENT/Mouth: membranes moist, no oral lesions or bleeding gums.      EYES:  no scleral icterus, normal conjunctivae   Chest/Lungs:   lungs are clear to auscultation, no rales or wheezing, respirations unlabored   Cardiovascular:   Regular. Normal first and second heart sounds, no edema bilateral lower extremities    Abdomen:  Soft, nontender, nondistended, bowel sounds present   Extremities: no cyanosis or clubbing   Skin: warm, dry.    Neurologic: mood and affect are appropriate, alert and oriented x3      Enc Vitals  BP: 100/70  Pulse: 90  Resp: 14  Weight: 102.1 kg (225 lb) (With shoes.)  Height: 180.3 cm (5' 11\")                                             Medical History  Surgical History Family History Social History   Past Medical History:   Diagnosis Date     Atrial fibrillation (H)      Coronary artery disease 24Jan2018     Diabetes mellitus (H) 01/2005    adult onset     GERD (gastroesophageal reflux disease)      High cholesterol      Hyperlipidemia " "2008     Hypertension 2006     Kidney stones 2012     Liver disease      Myocardial infarction (H)      Neuropathy of foot 07/16/2014     Obesity      DEWAYNE on CPAP      Sleep apnea      Uncontrolled type 2 diabetes mellitus with other diabetic kidney complication, unspecified long term insulin use status     Created by Conversion      Unspecified hereditary and idiopathic peripheral neuropathy     Created by Conversion     Past Surgical History:   Procedure Laterality Date     ARTHROSCOPY KNEE Left 8/6/15    Partial medial meniscectoy, [facility]     CARDIAC CATHETERIZATION  01/24/2017 [facility]     CORONARY STENT PLACEMENT       HC PERCUT NEPHROSTOLITHOTOMY W W/O DILATION <=2 CM      Description: Percutaneous Lithotomy;  Proc Date: 11/05/2012;     KIDNEY STONE SURGERY Left 02/05/2018    stent placement, lithotripsy, nephrolithotomy     LEG SURGERY       OTHER SURGICAL HISTORY Left 07/11/2014    Percutaneous Lithotomy      NV CATH PLACEMENT & NJX CORONARY ART ANGIO IMG S&I N/A 1/24/2017    Procedure: Coronary Angiogram;  Surgeon: Melissa Freeman MD;  Location: [facility];  Service: Cardiology     NV L HRT CATH W/NJX L VENTRICULOGRAPHY IMG S&I N/A 1/24/2017    Procedure: Left Heart Catheterization with Left Ventriculogram;  Surgeon: Melissa Freeman MD;  Location: [facility];  Service: Cardiology     RELEASE CARPAL TUNNEL Right 04/12/2016     ZZC LAP,CHOLECYSTECTOMY/EXPLORE      Description: Cholecystectomy Laparoscopic;  Proc Date: 12/11/2013;     ZZHC AMPUTATE METACARPAL+FINGER      Description: Metacarpal Amputation And Index Finger;  Recorded: 08/15/2014;  Comments: For \"cartilage cancer\"    No family history on file. Social History     Socioeconomic History     Marital status:      Spouse name: Not on file     Number of children: Not on file     Years of education: 13     Highest education level: Not on file   Occupational History     Not on " file   Tobacco Use     Smoking status: Former Smoker     Packs/day: 0.50     Smokeless tobacco: Never Used     Tobacco comment: Patient reports he is trying to quit.   Substance and Sexual Activity     Alcohol use: Yes     Drug use: No     Sexual activity: Not on file   Other Topics Concern     Not on file   Social History Narrative    Wife of 21 years passed away on 10/15/2017. Had 8 kids with her - 3 of hers from a previous marriage, 3 of his from a previous marriage, and 2 kids they adopted together. He has 20 grandchildren.      Social Determinants of Health     Financial Resource Strain: Not on file   Food Insecurity: Not on file   Transportation Needs: Not on file   Physical Activity: Not on file   Stress: Not on file   Social Connections: Not on file   Intimate Partner Violence: Not on file   Housing Stability: Not on file          Medications  Allergies   Current Outpatient Medications   Medication Sig Dispense Refill     albuterol (PROAIR RESPICLICK) 108 (90 Base) MCG/ACT inhaler Inhale 2 puffs into the lungs as needed       amLODIPine (NORVASC) 5 MG tablet Take 5 mg by mouth daily       apixaban ANTICOAGULANT (ELIQUIS) 5 MG tablet Take 5 mg by mouth 2 times daily       atorvastatin (LIPITOR) 80 MG tablet Take 80 mg by mouth daily       benzoyl peroxide 5 % external liquid        calcium-vitamin D 500-125 MG-UNIT TABS Take 1 tablet by mouth daily        clindamycin (CLEOCIN T) 1 % external lotion        clindamycin (CLEOCIN T) 1 % external lotion APPLY TO AFFECTED AREA TOPICALLY TWICE A DAY       clopidogrel (PLAVIX) 75 MG tablet Take 75 mg by mouth daily       cyanocobalamin (VITAMIN B-12) 1000 MCG tablet Take 1,000 mcg by mouth daily       glucose (BD GLUCOSE) 4 g chewable tablet CHEW FOUR TABLETS BY MOUTH  AS NEEDED FOR LOW BLOOD SUGAR REACTION *RETEST BLOOD SUGAR AFTER 15 MINUTES (USE THE 15-15 RULE TO TREAT)       insulin aspart (NOVOLOG PEN) 100 UNIT/ML pen Inject 22 Units Subcutaneous 2 times daily  (with meals)        insulin glargine (LANTUS) 100 UNIT/ML injection Inject 40 Units Subcutaneous 2 times daily        Liniments (VAPORIZING CREAM) 4.7-1.2-2.6 % CREA APPLY THIN LAYER TOPICALLY FOUR TIMES A DAY AS NEEDED FOR NECK PAIN       lisinopril (ZESTRIL) 40 MG tablet Take 40 mg by mouth daily       metFORMIN (GLUCOPHAGE) 1000 MG tablet Take 1,000 mg by mouth 2 times daily (with meals)       metoprolol succinate ER (TOPROL XL) 50 MG 24 hr tablet Take 50 mg by mouth 2 times daily        minocycline (MINOCIN) 100 MG capsule Take 100 mg by mouth 2 times daily as needed       nitroGLYcerin (NITROSTAT) 0.4 MG sublingual tablet Place 0.4 mg under the tongue as needed for chest pain       omeprazole (PRILOSEC) 20 MG DR capsule TAKE ONE CAPSULE BY MOUTH EVERY DAY TO DECREASE STOMACH ACID -TAKE ON AN EMPTY STOMACH, AT LEAST 30 MINUTES BEFORE A MEAL       Parenteral Therapy Supplies (UNIVERSAL SYRINGE TIP ADAPTOR) MISC 3 each       potassium citrate 15 MEQ (1620 MG) TBCR Take 30 mEq by mouth 2 times daily       semaglutide (OZEMPIC) 2 MG/1.5ML SOPN pen Inject 1 mg Subcutaneous once a week       study drug pemafibrate 0.2 mg vs placebo, PROMINENT, 0.2 mg TABS tablet Take 0.2 mg by mouth 2 times daily       TAMSULOSIN HCL PO Take 0.4 mg by mouth daily       testosterone cypionate (DEPOTESTOSTERONE) 200 MG/ML injection Inject 200 mg into the muscle every 14 days       tretinoin (RETIN-A) 0.025 % external cream APPLY SPARINGLY TO FACE TOPICALLY EVERY THIRD DAY FOR 2 WEEKS, THEN APPLY SPARINGLY TO FACE EVERY OTHER DAY AT BEDTIME FOR 2 WEEKS, THEN APPLY SPARINGLY TO FACE AT BEDTIME      Allergies   Allergen Reactions     Empagliflozin Headache and Other (See Comments)     Gabapentin Swelling     Of face and hands     Nsaids      Pt is on Plavix     Tolmetin Unknown     Pt is on Plavix  Pt is on Plavix                        Thank you for allowing me to participate in the care of your patient.      Sincerely,     Yvonne  KELLEN Florian     [facility]  cc:   No referring provider defined for this encounter.

## 2022-05-20 NOTE — PROGRESS NOTES
"Pemafibrate to Reduce cardiovascular OutcoMes by reducing triglycerides IN patiENts with diabeTes (PROMINENT) clinical trial.    Subject seen in clinic today for End of study visit.      Subject seen by provider for study related physical exam.  See provider note and flow sheets for vital signs.  Waist Measurements: 126 cm  EQ-[id number] questionnaire completed if applicable.  Must complete annually (Month 12, etc.)    Study efficacy, endpoints and adverse events reviewed with subject.  Cardiovascular risk discussed.     Study central labs and urine drawn per protocol.    Study alcohol consumption stipulations and education reviewed with subject:    Subject reports  [] Never [] Current [x] Former, stop date  .   2-4  drinks per [] Day [] Week [x] Month [] Occasional.  1 maximum drinks per sitting.     Study medication box # [id number] with 70 tabs  Study medication box # [id number] with 70 tabs  Study medication box # [id number] 70 tabs  Study medication box # [id number] with 62 tabs  returned by subject.  Total tablet count of 272 for 100 % compliance.  Adverse Events of :  1.Hypertension Exacerbation  2.Recovering from \"Subdural Hemotoma\"  3.Pulmoney Nodule  4.Intermittent Headaches  5.Achalasia  6.Intermittent Palpitations/Paroysmal A Fib  7.Hypogonadism  Are considered ongoing at the end of study.    Plan: This is the final study visit and there is no further research follow up required.     Thank you for your participation in the Pemafibrate to Reduce cardiovascular OutcoMes by reducing triglycerides IN patiENts with diabeTes (PROMINENT) clinical trial.  It's been a pleasure getting to know you during the study and I appreciate all of your time and effort put in.  This is our final contact for the study but we can let you know of any future studies that might be of interest to you.  Don't hesitate to reach out with any questions or thoughts in the future as well.    Best regards,  Renee Aguilar RN   Research " Nurse

## 2022-05-21 ENCOUNTER — TRANSFERRED RECORDS (OUTPATIENT)
Dept: CARDIOLOGY | Facility: CLINIC | Age: 62
End: 2022-05-21
Payer: COMMERCIAL

## 2022-06-13 NOTE — PROGRESS NOTES
Research labs from May 21 reviewed, mildly increased alkaline phosphatase of 127 and GGTP of 72 noted and not clinically significant.  Increased glucose of 191 as well as hemoglobin A1c of 9.9 noted and not clinically significant.  Urine protein slightly elevated 50 noted and also not clinically significant.  LF

## 2022-07-07 ENCOUNTER — TRANSFERRED RECORDS (OUTPATIENT)
Dept: CARDIOLOGY | Facility: CLINIC | Age: 62
End: 2022-07-07